# Patient Record
Sex: MALE | Race: WHITE | NOT HISPANIC OR LATINO | Employment: OTHER | URBAN - METROPOLITAN AREA
[De-identification: names, ages, dates, MRNs, and addresses within clinical notes are randomized per-mention and may not be internally consistent; named-entity substitution may affect disease eponyms.]

---

## 2017-02-21 ENCOUNTER — RESOLUTE PROFESSIONAL BILLING HOSPITAL PROF FEE (OUTPATIENT)
Dept: HOSPITALIST | Facility: MEDICAL CENTER | Age: 37
End: 2017-02-21
Payer: OTHER MISCELLANEOUS

## 2017-02-21 ENCOUNTER — HOSPITAL ENCOUNTER (INPATIENT)
Facility: MEDICAL CENTER | Age: 37
LOS: 5 days | DRG: 520 | End: 2017-02-26
Attending: EMERGENCY MEDICINE | Admitting: HOSPITALIST
Payer: OTHER MISCELLANEOUS

## 2017-02-21 ENCOUNTER — APPOINTMENT (OUTPATIENT)
Dept: RADIOLOGY | Facility: MEDICAL CENTER | Age: 37
DRG: 520 | End: 2017-02-21
Attending: EMERGENCY MEDICINE
Payer: OTHER MISCELLANEOUS

## 2017-02-21 DIAGNOSIS — M51.36 L4-L5 DISC BULGE: ICD-10-CM

## 2017-02-21 DIAGNOSIS — M54.16 LUMBAR RADICULOPATHY, ACUTE: ICD-10-CM

## 2017-02-21 DIAGNOSIS — M54.16 LUMBAR NERVE ROOT COMPRESSION: ICD-10-CM

## 2017-02-21 DIAGNOSIS — M54.16 LEFT LUMBAR RADICULOPATHY: ICD-10-CM

## 2017-02-21 PROBLEM — M54.59 INTRACTABLE LOW BACK PAIN: Status: ACTIVE | Noted: 2017-02-21

## 2017-02-21 LAB
ALBUMIN SERPL BCP-MCNC: 3.6 G/DL (ref 3.2–4.9)
ALBUMIN/GLOB SERPL: 1.3 G/DL
ALP SERPL-CCNC: 53 U/L (ref 30–99)
ALT SERPL-CCNC: 11 U/L (ref 2–50)
ANION GAP SERPL CALC-SCNC: 7 MMOL/L (ref 0–11.9)
APPEARANCE UR: CLEAR
AST SERPL-CCNC: 14 U/L (ref 12–45)
BASOPHILS # BLD AUTO: 0.7 % (ref 0–1.8)
BASOPHILS # BLD: 0.06 K/UL (ref 0–0.12)
BILIRUB SERPL-MCNC: 0.4 MG/DL (ref 0.1–1.5)
BILIRUB UR QL STRIP.AUTO: NEGATIVE
BUN SERPL-MCNC: 17 MG/DL (ref 8–22)
CALCIUM SERPL-MCNC: 8.8 MG/DL (ref 8.5–10.5)
CHLORIDE SERPL-SCNC: 108 MMOL/L (ref 96–112)
CO2 SERPL-SCNC: 22 MMOL/L (ref 20–33)
COLOR UR: YELLOW
CREAT SERPL-MCNC: 0.87 MG/DL (ref 0.5–1.4)
CULTURE IF INDICATED INDCX: NO UA CULTURE
EOSINOPHIL # BLD AUTO: 0.27 K/UL (ref 0–0.51)
EOSINOPHIL NFR BLD: 3.3 % (ref 0–6.9)
ERYTHROCYTE [DISTWIDTH] IN BLOOD BY AUTOMATED COUNT: 38.2 FL (ref 35.9–50)
GFR SERPL CREATININE-BSD FRML MDRD: >60 ML/MIN/1.73 M 2
GLOBULIN SER CALC-MCNC: 2.7 G/DL (ref 1.9–3.5)
GLUCOSE SERPL-MCNC: 95 MG/DL (ref 65–99)
GLUCOSE UR STRIP.AUTO-MCNC: NEGATIVE MG/DL
HCT VFR BLD AUTO: 46.2 % (ref 42–52)
HGB BLD-MCNC: 16.1 G/DL (ref 14–18)
IMM GRANULOCYTES # BLD AUTO: 0.04 K/UL (ref 0–0.11)
IMM GRANULOCYTES NFR BLD AUTO: 0.5 % (ref 0–0.9)
KETONES UR STRIP.AUTO-MCNC: NEGATIVE MG/DL
LEUKOCYTE ESTERASE UR QL STRIP.AUTO: NEGATIVE
LYMPHOCYTES # BLD AUTO: 3.1 K/UL (ref 1–4.8)
LYMPHOCYTES NFR BLD: 38.2 % (ref 22–41)
MCH RBC QN AUTO: 28.4 PG (ref 27–33)
MCHC RBC AUTO-ENTMCNC: 34.8 G/DL (ref 33.7–35.3)
MCV RBC AUTO: 81.5 FL (ref 81.4–97.8)
MICRO URNS: NORMAL
MONOCYTES # BLD AUTO: 0.5 K/UL (ref 0–0.85)
MONOCYTES NFR BLD AUTO: 6.2 % (ref 0–13.4)
NEUTROPHILS # BLD AUTO: 4.15 K/UL (ref 1.82–7.42)
NEUTROPHILS NFR BLD: 51.1 % (ref 44–72)
NITRITE UR QL STRIP.AUTO: NEGATIVE
NRBC # BLD AUTO: 0 K/UL
NRBC BLD AUTO-RTO: 0 /100 WBC
PH UR STRIP.AUTO: 6 [PH]
PLATELET # BLD AUTO: 324 K/UL (ref 164–446)
PMV BLD AUTO: 9 FL (ref 9–12.9)
POTASSIUM SERPL-SCNC: 4.1 MMOL/L (ref 3.6–5.5)
PROT SERPL-MCNC: 6.3 G/DL (ref 6–8.2)
PROT UR QL STRIP: NEGATIVE MG/DL
RBC # BLD AUTO: 5.67 M/UL (ref 4.7–6.1)
RBC UR QL AUTO: NEGATIVE
SODIUM SERPL-SCNC: 137 MMOL/L (ref 135–145)
SP GR UR STRIP.AUTO: 1.01
WBC # BLD AUTO: 8.1 K/UL (ref 4.8–10.8)

## 2017-02-21 PROCEDURE — 80053 COMPREHEN METABOLIC PANEL: CPT

## 2017-02-21 PROCEDURE — 700111 HCHG RX REV CODE 636 W/ 250 OVERRIDE (IP): Performed by: EMERGENCY MEDICINE

## 2017-02-21 PROCEDURE — 99285 EMERGENCY DEPT VISIT HI MDM: CPT

## 2017-02-21 PROCEDURE — 700102 HCHG RX REV CODE 250 W/ 637 OVERRIDE(OP): Performed by: HOSPITALIST

## 2017-02-21 PROCEDURE — 96375 TX/PRO/DX INJ NEW DRUG ADDON: CPT

## 2017-02-21 PROCEDURE — A9270 NON-COVERED ITEM OR SERVICE: HCPCS | Performed by: INTERNAL MEDICINE

## 2017-02-21 PROCEDURE — 72158 MRI LUMBAR SPINE W/O & W/DYE: CPT

## 2017-02-21 PROCEDURE — 700102 HCHG RX REV CODE 250 W/ 637 OVERRIDE(OP): Performed by: INTERNAL MEDICINE

## 2017-02-21 PROCEDURE — 99223 1ST HOSP IP/OBS HIGH 75: CPT | Performed by: HOSPITALIST

## 2017-02-21 PROCEDURE — 700111 HCHG RX REV CODE 636 W/ 250 OVERRIDE (IP): Performed by: HOSPITALIST

## 2017-02-21 PROCEDURE — 700117 HCHG RX CONTRAST REV CODE 255: Performed by: EMERGENCY MEDICINE

## 2017-02-21 PROCEDURE — 81003 URINALYSIS AUTO W/O SCOPE: CPT

## 2017-02-21 PROCEDURE — 770006 HCHG ROOM/CARE - MED/SURG/GYN SEMI*

## 2017-02-21 PROCEDURE — 96374 THER/PROPH/DIAG INJ IV PUSH: CPT

## 2017-02-21 PROCEDURE — A9270 NON-COVERED ITEM OR SERVICE: HCPCS | Performed by: HOSPITALIST

## 2017-02-21 PROCEDURE — A9579 GAD-BASE MR CONTRAST NOS,1ML: HCPCS | Performed by: EMERGENCY MEDICINE

## 2017-02-21 PROCEDURE — 85025 COMPLETE CBC W/AUTO DIFF WBC: CPT

## 2017-02-21 PROCEDURE — 96376 TX/PRO/DX INJ SAME DRUG ADON: CPT

## 2017-02-21 RX ORDER — ENEMA 19; 7 G/133ML; G/133ML
1 ENEMA RECTAL
Status: DISCONTINUED | OUTPATIENT
Start: 2017-02-21 | End: 2017-02-26 | Stop reason: HOSPADM

## 2017-02-21 RX ORDER — OXYCODONE HYDROCHLORIDE 10 MG/1
10 TABLET ORAL
Status: DISCONTINUED | OUTPATIENT
Start: 2017-02-21 | End: 2017-02-26 | Stop reason: HOSPADM

## 2017-02-21 RX ORDER — M-VIT,TX,IRON,MINS/CALC/FOLIC 27MG-0.4MG
1 TABLET ORAL DAILY
COMMUNITY
End: 2017-04-07

## 2017-02-21 RX ORDER — MORPHINE SULFATE 4 MG/ML
4 INJECTION, SOLUTION INTRAMUSCULAR; INTRAVENOUS
Status: DISCONTINUED | OUTPATIENT
Start: 2017-02-21 | End: 2017-02-22

## 2017-02-21 RX ORDER — OMEPRAZOLE 20 MG/1
20 CAPSULE, DELAYED RELEASE ORAL DAILY
Status: DISCONTINUED | OUTPATIENT
Start: 2017-02-22 | End: 2017-02-26 | Stop reason: HOSPADM

## 2017-02-21 RX ORDER — CHLORAL HYDRATE 500 MG
1000 CAPSULE ORAL DAILY
COMMUNITY
End: 2017-04-07

## 2017-02-21 RX ORDER — PROMETHAZINE HYDROCHLORIDE 25 MG/1
12.5-25 TABLET ORAL EVERY 4 HOURS PRN
Status: DISCONTINUED | OUTPATIENT
Start: 2017-02-21 | End: 2017-02-26 | Stop reason: HOSPADM

## 2017-02-21 RX ORDER — DIAZEPAM 5 MG/ML
5 INJECTION, SOLUTION INTRAMUSCULAR; INTRAVENOUS ONCE
Status: COMPLETED | OUTPATIENT
Start: 2017-02-21 | End: 2017-02-21

## 2017-02-21 RX ORDER — DEXAMETHASONE SODIUM PHOSPHATE 4 MG/ML
4 INJECTION, SOLUTION INTRA-ARTICULAR; INTRALESIONAL; INTRAMUSCULAR; INTRAVENOUS; SOFT TISSUE EVERY 6 HOURS
Status: DISCONTINUED | OUTPATIENT
Start: 2017-02-22 | End: 2017-02-26 | Stop reason: HOSPADM

## 2017-02-21 RX ORDER — GABAPENTIN 300 MG/1
300 CAPSULE ORAL 3 TIMES DAILY
COMMUNITY
End: 2017-04-07

## 2017-02-21 RX ORDER — ONDANSETRON 2 MG/ML
4 INJECTION INTRAMUSCULAR; INTRAVENOUS EVERY 4 HOURS PRN
Status: DISCONTINUED | OUTPATIENT
Start: 2017-02-21 | End: 2017-02-26

## 2017-02-21 RX ORDER — LACTULOSE 20 G/30ML
30 SOLUTION ORAL
Status: DISCONTINUED | OUTPATIENT
Start: 2017-02-21 | End: 2017-02-26 | Stop reason: HOSPADM

## 2017-02-21 RX ORDER — AMOXICILLIN 250 MG
1 CAPSULE ORAL
Status: DISCONTINUED | OUTPATIENT
Start: 2017-02-21 | End: 2017-02-26

## 2017-02-21 RX ORDER — PROMETHAZINE HYDROCHLORIDE 12.5 MG/1
12.5-25 SUPPOSITORY RECTAL EVERY 4 HOURS PRN
Status: DISCONTINUED | OUTPATIENT
Start: 2017-02-21 | End: 2017-02-26 | Stop reason: HOSPADM

## 2017-02-21 RX ORDER — DOCUSATE SODIUM 100 MG/1
100 CAPSULE, LIQUID FILLED ORAL 2 TIMES DAILY
Status: DISCONTINUED | OUTPATIENT
Start: 2017-02-21 | End: 2017-02-26 | Stop reason: HOSPADM

## 2017-02-21 RX ORDER — ACETAMINOPHEN 325 MG/1
650 TABLET ORAL EVERY 6 HOURS PRN
Status: DISCONTINUED | OUTPATIENT
Start: 2017-02-21 | End: 2017-02-26 | Stop reason: HOSPADM

## 2017-02-21 RX ORDER — AMOXICILLIN 250 MG
1 CAPSULE ORAL NIGHTLY
Status: DISCONTINUED | OUTPATIENT
Start: 2017-02-21 | End: 2017-02-26 | Stop reason: HOSPADM

## 2017-02-21 RX ORDER — ONDANSETRON 2 MG/ML
4 INJECTION INTRAMUSCULAR; INTRAVENOUS ONCE
Status: COMPLETED | OUTPATIENT
Start: 2017-02-21 | End: 2017-02-21

## 2017-02-21 RX ORDER — OXYCODONE HYDROCHLORIDE 5 MG/1
5 TABLET ORAL
Status: DISCONTINUED | OUTPATIENT
Start: 2017-02-21 | End: 2017-02-26 | Stop reason: HOSPADM

## 2017-02-21 RX ORDER — ALBUTEROL SULFATE 90 UG/1
2 AEROSOL, METERED RESPIRATORY (INHALATION) EVERY 6 HOURS PRN
COMMUNITY

## 2017-02-21 RX ORDER — IBUPROFEN 600 MG/1
600 TABLET ORAL EVERY 6 HOURS PRN
Status: DISCONTINUED | OUTPATIENT
Start: 2017-02-21 | End: 2017-02-22

## 2017-02-21 RX ORDER — ALBUTEROL SULFATE 90 UG/1
2 AEROSOL, METERED RESPIRATORY (INHALATION) EVERY 6 HOURS PRN
Status: DISCONTINUED | OUTPATIENT
Start: 2017-02-21 | End: 2017-02-26 | Stop reason: HOSPADM

## 2017-02-21 RX ORDER — CHOLECALCIFEROL (VITAMIN D3) 125 MCG
1000 CAPSULE ORAL DAILY
Status: DISCONTINUED | OUTPATIENT
Start: 2017-02-22 | End: 2017-02-26 | Stop reason: HOSPADM

## 2017-02-21 RX ORDER — ONDANSETRON 4 MG/1
4 TABLET, ORALLY DISINTEGRATING ORAL EVERY 4 HOURS PRN
Status: DISCONTINUED | OUTPATIENT
Start: 2017-02-21 | End: 2017-02-26 | Stop reason: HOSPADM

## 2017-02-21 RX ORDER — BISACODYL 10 MG
10 SUPPOSITORY, RECTAL RECTAL
Status: DISCONTINUED | OUTPATIENT
Start: 2017-02-21 | End: 2017-02-26 | Stop reason: HOSPADM

## 2017-02-21 RX ORDER — DEXAMETHASONE SODIUM PHOSPHATE 4 MG/ML
4 INJECTION, SOLUTION INTRA-ARTICULAR; INTRALESIONAL; INTRAMUSCULAR; INTRAVENOUS; SOFT TISSUE EVERY 6 HOURS
Status: DISCONTINUED | OUTPATIENT
Start: 2017-02-21 | End: 2017-02-21

## 2017-02-21 RX ORDER — ALUMINA, MAGNESIA, AND SIMETHICONE 2400; 2400; 240 MG/30ML; MG/30ML; MG/30ML
10 SUSPENSION ORAL 4 TIMES DAILY PRN
Status: DISCONTINUED | OUTPATIENT
Start: 2017-02-21 | End: 2017-02-26 | Stop reason: HOSPADM

## 2017-02-21 RX ORDER — IBUPROFEN 200 MG
600-800 TABLET ORAL EVERY 6 HOURS
Status: ON HOLD | COMMUNITY
End: 2017-02-25

## 2017-02-21 RX ADMIN — HYDROMORPHONE HYDROCHLORIDE 1 MG: 1 INJECTION, SOLUTION INTRAMUSCULAR; INTRAVENOUS; SUBCUTANEOUS at 13:47

## 2017-02-21 RX ADMIN — HYDROMORPHONE HYDROCHLORIDE 1 MG: 1 INJECTION, SOLUTION INTRAMUSCULAR; INTRAVENOUS; SUBCUTANEOUS at 15:15

## 2017-02-21 RX ADMIN — MORPHINE SULFATE 4 MG: 4 INJECTION INTRAVENOUS at 21:11

## 2017-02-21 RX ADMIN — HYDROMORPHONE HYDROCHLORIDE 1 MG: 1 INJECTION, SOLUTION INTRAMUSCULAR; INTRAVENOUS; SUBCUTANEOUS at 17:52

## 2017-02-21 RX ADMIN — DEXAMETHASONE SODIUM PHOSPHATE 4 MG: 4 INJECTION, SOLUTION INTRAMUSCULAR; INTRAVENOUS at 23:54

## 2017-02-21 RX ADMIN — DIAZEPAM 5 MG: 5 INJECTION, SOLUTION INTRAMUSCULAR; INTRAVENOUS at 13:46

## 2017-02-21 RX ADMIN — OXYCODONE HYDROCHLORIDE 10 MG: 10 TABLET ORAL at 23:54

## 2017-02-21 RX ADMIN — GADODIAMIDE 20 ML: 287 INJECTION INTRAVENOUS at 15:13

## 2017-02-21 RX ADMIN — ALUMINUM HYDROXIDE, MAGNESIUM HYDROXIDE,SIMETHICONE 10 ML: 400; 400; 40 LIQUID ORAL at 23:55

## 2017-02-21 RX ADMIN — ONDANSETRON 4 MG: 2 INJECTION, SOLUTION INTRAMUSCULAR; INTRAVENOUS at 13:47

## 2017-02-21 RX ADMIN — DEXAMETHASONE SODIUM PHOSPHATE 4 MG: 4 INJECTION, SOLUTION INTRAMUSCULAR; INTRAVENOUS at 16:43

## 2017-02-21 ASSESSMENT — PAIN SCALES - GENERAL
PAINLEVEL_OUTOF10: 6
PAINLEVEL_OUTOF10: 8
PAINLEVEL_OUTOF10: 10

## 2017-02-21 ASSESSMENT — LIFESTYLE VARIABLES
EVER_SMOKED: YES
ALCOHOL_USE: NO

## 2017-02-21 NOTE — LETTER
[facility], EMERGENCY DEPT   [address], [place] [address]  Phone: Dept: [phone] - Fax:        [facility] Progress Report and Disability Certification  Date of Service: 2/21/2017   No Show:  No  Date / Time of Next Visit:     Claim Information   Patient Name: Rodriguez Kramer  Claim Number:     Employer:    Date of Injury: 12/3/2012     Insurer / TPA: [employer] Workers Comp ID / SSN: [id number]    Occupation:  Diagnosis: There were no encounter diagnoses.    Medical Information   Related to Industrial Injury?   ***   Subjective Complaints:      Objective Findings:     Pre-Existing Condition(s):     Assessment:        Status:    Permanent Disability:     Plan:      Diagnostics:      Comments:       Disability Information   Status:      From:     Through:   Restrictions are:     Physical Restrictions   Sitting:    Standing:    Stooping:    Bending:      Squatting:    Walking:    Climbing:    Pushing:      Pulling:    Other:    Reaching Above Shoulder (L):   Reaching Above Shoulder (R):       Reaching Below Shoulder (L):    Reaching Below Shoulder (R):      Not to exceed Weight Limits   Carrying(hrs):   Weight Limit(lb):   Lifting(hrs):   Weight  Limit(lb):     Comments:      Repetitive Actions   Hands: i.e. Fine Manipulations from Grasping:     Feet: i.e. Operating Foot Controls:     Driving / Operate Machinery:     Physician Name: Elen Abdi Physician Signature:   e-Signature:  , Medical Director   Clinic Name / Location: [facility], EMERGENCY DEPT  [address] [place] [id number] [phone]     Clinic Phone Number: Dept: [phone]   Appointment Time:  Visit Start Time:    Check-In Time:  1:18 PM Visit Discharge Time:    Original-Treating Physician or Chiropractor    Page 2-Insurer/TPA    Page 3-Employer    Page 4-Employee

## 2017-02-21 NOTE — IP AVS SNAPSHOT
PlanetTran Access Code: [email or web address]  Expires: 3/28/2017 12:10 PM    Your email address is not on file at [employer].  Email Addresses are required for you to sign up for PlanetTran, please contact [phone] to verify your personal information and to provide your email address prior to attempting to register for PlanetTran.    Rodriguez Kramer  [address] [place] [address]    PlanetTran  A secure, online tool to manage your health information     [employer]’s PlanetTran® is a secure, online tool that connects you to your personalized health information from the privacy of your home -- day or night - making it very easy for you to manage your healthcare. Once the activation process is completed, you can even access your medical information using the PlanetTran alysia, which is available for free in the Apple Alysia store or Google Play store.     To learn more about PlanetTran, visit [email or web address]"t    There are two levels of access available (as shown below):   My Chart Features  [facility] Primary Care Doctor [facility]  Specialists [facility]  Urgent  Care Non-[facility] Primary Care Doctor   Email your healthcare team securely and privately 24/7 X X X    Manage appointments: schedule your next appointment; view details of past/upcoming appointments X      Request prescription refills. X      View recent personal medical records, including lab and immunizations X X X X   View health record, including health history, allergies, medications X X X X   Read reports about your outpatient visits, procedures, consult and ER notes X X X X   See your discharge summary, which is a recap of your hospital and/or ER visit that includes your diagnosis, lab results, and care plan X X  X     How to register for "[employer]"t:  Once your e-mail address has been verified, follow the following steps to sign up for "[employer]"t.     1. Go to  [email or web address]  2. Click on the Sign Up Now box, which takes you to the New Member Sign Up page. You will need  to provide the following information:  a. Enter your Infotop Access Code exactly as it appears at the top of this page. (You will not need to use this code after you’ve completed the sign-up process. If you do not sign up before the expiration date, you must request a new code.)   b. Enter your date of birth.   c. Enter your home email address.   d. Click Submit, and follow the next screen’s instructions.  3. Create a Infotop ID. This will be your Infotop login ID and cannot be changed, so think of one that is secure and easy to remember.  4. Create a Infotop password. You can change your password at any time.  5. Enter your Password Reset Question and Answer. This can be used at a later time if you forget your password.   6. Enter your e-mail address. This allows you to receive e-mail notifications when new information is available in Infotop.  7. Click Sign Up. You can now view your health information.    For assistance activating your Infotop account, call [phone]

## 2017-02-21 NOTE — ED PROVIDER NOTES
"ED Provider Note    Scribed for Elen Abdi M.D. by Ricardo Yeung. 2/21/2017  1:27 PM    Primary care provider: Selvin Marks M.D.  Means of arrival: EMS  History obtained from: patient  History limited by: none    CHIEF COMPLAINT  Chief Complaint   Patient presents with   • Low Back Pain     chronic back pain, flaring last few days, severe pain at this time.  Non-ambulatory       HPI  Rodriguez Kramer is a 36 y.o. male with chronic back pain who presents to the Emergency Department with worsening left sided low back pain starting 3 days ago. He states that he has been walking more the past few days, which exacerbated his chonric condition. Patient states that he coughed today, causing sudden and severe pain in his back. Patient went to lay down in bed, but was unable to get up. He reports being unable to ambulate secondary to his pain. Patient describes his back pain as burning, that radiates down his left leg \"like a laser\". He states that he has associated left foot pain with numbness. Patient has a history of back fusion of the L3-L4 and L4-L5 secondary to a work injury. Dr. Lindsey and Dr. Lambert are his consulting physicians. He takes gabapentin for his back pain. Patient reports a history of marijuana use, cigarette use, and asthma. Patient denies loss of bladder control, alcohol use, fever, congestion.     REVIEW OF SYSTEMS  HEENT:  No ear pain, congestion, or sore throat   EYES: no discharge, redness, or vision changes  CARDIAC: no chest pain, no palpitations    PULMONARY: Positive cough, no dyspnea or congestion   GI: no vomiting, diarrhea, or abdominal pain   : no loss of bladder control, dysuria or hematuria   Neuro: no weakness, aphasia, or headache  Musculoskeletal: Positive back pain,left foot pain,left foot numbness no swelling, deformity, or joint swelling  Endocrine: no fevers, sweating, or weight loss   SKIN: no rash, erythema, or contusions     See history of present illness.       PAST " "MEDICAL HISTORY   has a past medical history of ASTHMA and Bulging discs (12/2012).    SURGICAL HISTORY   has past surgical history that includes lumbar laminectomy diskectomy (11/25/08); other neurological surg; lumbar laminectomy diskectomy (8/3/2014); and lumbar fusion posterior (6/15/2015).    SOCIAL HISTORY  Social History   Substance Use Topics   • Smoking status: Current Every Day Smoker -- 0.20 packs/day     Types: Cigarettes   • Smokeless tobacco: No   • Alcohol Use: No      History   Drug Use No       FAMILY HISTORY  None noted    CURRENT MEDICATIONS    Current facility-administered medications:   •  [START ON 2/22/2017] dexamethasone (DECADRON) injection 4 mg, 4 mg, Intravenous, Q6HRS, Nain Painter M.D.    Current outpatient prescriptions:   •  gabapentin (NEURONTIN) 300 MG Cap, Take 300 mg by mouth 3 times a day., Disp: , Rfl:   •  therapeutic multivitamin-minerals (THERAGRAN-M) Tab, Take 1 Tab by mouth every day., Disp: , Rfl:   •  albuterol 108 (90 BASE) MCG/ACT Aero Soln inhalation aerosol, Inhale 2 Puffs by mouth every 6 hours as needed for Shortness of Breath., Disp: , Rfl:   •  Omega-3 Fatty Acids (FISH OIL) 1000 MG Cap capsule, Take 1,000 mg by mouth every day., Disp: , Rfl:   •  Cyanocobalamin (B-12 PO), Take 1 Tab by mouth every day., Disp: , Rfl:   •  Cholecalciferol (VITAMIN D PO), Take 1 Cap by mouth every day., Disp: , Rfl:   •  ibuprofen (MOTRIN) 200 MG Tab, Take 600-800 mg by mouth every 6 hours. Indications: Mild to Moderate Pain, Disp: , Rfl:     ALLERGIES  Allergies   Allergen Reactions   • Nkda [No Known Drug Allergy]        PHYSICAL EXAM  VITAL SIGNS: /88 mmHg  Pulse 101  Temp(Src) 37.2 °C (98.9 °F)  Resp 18  Ht 1.829 m (6' 0.01\")  Wt 99.791 kg (220 lb)  BMI 29.83 kg/m2    Constitutional: Well developed, Well nourished, Significant distress secondary to pain, Non-toxic appearance. Tearful and uncomfortable.   HEENT: Normocephalic, Atraumatic,  external ears normal, " pharynx pink,  Mucous  Membranes moist, No rhinorrhea or mucosal edema  Eyes: PERRL, EOMI, Conjunctiva normal, No discharge.   Neck: Normal range of motion, No tenderness, Supple, No stridor.   Lymphatic: No lymphadenopathy    Cardiovascular: Regular Rate and Rhythm, No murmurs,  rubs, or gallops.   Thorax & Lungs: Lungs clear to auscultation bilaterally, No respiratory distress, No wheezes, rhales or rhonchi, No chest wall tenderness.   Abdomen: Bowel sounds normal, Soft, non tender, non distended,  No pulsatile masses., no rebound guarding or peritoneal signs.   Skin: Warm, Dry, No erythema, No rash,   Extremities: Equal, intact distal pulses, No cyanosis, No tenderness.   Musculoskeletal: Good range of motion in all major joints. No major deformities noted. Paraspinus lumbar muscle tenderness with muscle spasms. Difficult finding a comfortable position  Neurologic: Alert & awake, no focal deficits. Left lateral foot numbness.   Psychiatric: Affect normal    DIAGNOSTIC STUDIES / PROCEDURES    LABS  Results for orders placed or performed during the hospital encounter of 02/21/17   CBC WITH DIFFERENTIAL   Result Value Ref Range    WBC 8.1 4.8 - 10.8 K/uL    RBC 5.67 4.70 - 6.10 M/uL    Hemoglobin 16.1 14.0 - 18.0 g/dL    Hematocrit 46.2 42.0 - 52.0 %    MCV 81.5 81.4 - 97.8 fL    MCH 28.4 27.0 - 33.0 pg    MCHC 34.8 33.7 - 35.3 g/dL    RDW 38.2 35.9 - 50.0 fL    Platelet Count 324 164 - 446 K/uL    MPV 9.0 9.0 - 12.9 fL    Neutrophils-Polys 51.10 44.00 - 72.00 %    Lymphocytes 38.20 22.00 - 41.00 %    Monocytes 6.20 0.00 - 13.40 %    Eosinophils 3.30 0.00 - 6.90 %    Basophils 0.70 0.00 - 1.80 %    Immature Granulocytes 0.50 0.00 - 0.90 %    Nucleated RBC 0.00 /100 WBC    Neutrophils (Absolute) 4.15 1.82 - 7.42 K/uL    Lymphs (Absolute) 3.10 1.00 - 4.80 K/uL    Monos (Absolute) 0.50 0.00 - 0.85 K/uL    Eos (Absolute) 0.27 0.00 - 0.51 K/uL    Baso (Absolute) 0.06 0.00 - 0.12 K/uL    Immature Granulocytes (abs) 0.04  0.00 - 0.11 K/uL    NRBC (Absolute) 0.00 K/uL   CMP   Result Value Ref Range    Sodium 137 135 - 145 mmol/L    Potassium 4.1 3.6 - 5.5 mmol/L    Chloride 108 96 - 112 mmol/L    Co2 22 20 - 33 mmol/L    Anion Gap 7.0 0.0 - 11.9    Glucose 95 65 - 99 mg/dL    Bun 17 8 - 22 mg/dL    Creatinine 0.87 0.50 - 1.40 mg/dL    Calcium 8.8 8.5 - 10.5 mg/dL    AST(SGOT) 14 12 - 45 U/L    ALT(SGPT) 11 2 - 50 U/L    Alkaline Phosphatase 53 30 - 99 U/L    Total Bilirubin 0.4 0.1 - 1.5 mg/dL    Albumin 3.6 3.2 - 4.9 g/dL    Total Protein 6.3 6.0 - 8.2 g/dL    Globulin 2.7 1.9 - 3.5 g/dL    A-G Ratio 1.3 g/dL   URINALYSIS,CULTURE IF INDICATED   Result Value Ref Range    Color Yellow     Character Clear     Specific Gravity 1.015 <1.035    Ph 6.0 5.0-8.0    Glucose Negative Negative mg/dL    Ketones Negative Negative mg/dL    Protein Negative Negative mg/dL    Bilirubin Negative Negative    Nitrite Negative Negative    Leukocyte Esterase Negative Negative    Occult Blood Negative Negative    Micro Urine Req see below     Culture Indicated No UA Culture   ESTIMATED GFR   Result Value Ref Range    GFR If African American >60 >60 mL/min/1.73 m 2    GFR If Non African American >60 >60 mL/min/1.73 m 2   All labs reviewed by me.    RADIOLOGY  MR-LUMBAR SPINE-WITH & W/O   Final Result      1.  Transitional lumbosacral junction. Partial sacralization of L5. Developmentally diminutive L5-S1 disc space.   2.  L3-4 postoperative lumbar laminectomy. Interval posterior fusion with transpedicular screw fixation at L3-L4.   3.  L3-4 central and left paramedian disc-osteophyte complex, primarily spur. Moderate left lateral recess stenosis. Aberrant soft tissue the left lateral recess which may represent epidural scarring perhaps with some underlying hypoenhancing disc    material. Borderline left inferior foraminal stenosis.   4.  L4-5 small central and right paramedian disc protrusion. Moderate sized left-sided caudad disc extrusion or free  fragment. New finding since the previous exam. Compromise of the left L5 nerve root and also impingement on the thecal sac to the    emerging left S1 nerve root. Persisting hypoenhancing aberrant soft tissue at the right lateral recess which was seen on the previous exam as a moderate-sized right-sided disc extrusion or free fragment.   5.  L5-S1 no significant disc bulge or protrusion. No central or foraminal stenosis at the L5-S1 disc space level.      The radiologist's interpretation of all radiological studies have been reviewed by me.    COURSE & MEDICAL DECISION MAKING  Nursing notes, VS, PMSFHx reviewed in chart.     1:27 PM - Patient seen and examined at bedside. Patient will be treated with Dilaudid 1 mg, Zofran 4 mg, Valium 5 mg. Ordered MR lumbar spine, Estimated GFR, Urinalysis, CBC with differential, CMP to evaluate his symptoms. Differential diagnoses include but not limited to: exacerbation of chronic condition, bulging disc, lumbar radiculopathy    2:48 PM - Nurse informs me that the patient is complaining of increased pain. I will order an additional 1 mg Dilaudid.     4:32 PM - I discussed the patient's case and the above findings with Dr. Calderon (neurosurgeon) who recommends that the patient be admitted. He requests that decadron be administered. I agree and will paged hospitalist. Patient will be treated with 4 mg Decadron.     4:36 PM - Paged hospitalist.    4:39 PM - informed patient of treatment plan. Patient will be admitted and provided with steroid injections every 6 hours and pain medication. Patient and girlfriend asking questions about the process. Informed them that Dr. Lindsey will follow up with the patient. Patient agrees to admission and is in good spirits.     4:48 PM - I discussed the patient's case and the above findings with Dr. Joseph (hospitalist) who agrees to admit the patient. Dr. Lindsey to consult.    DISPOSITION:  Patient will be admitted to hospitalist in [facility]  condition.      FINAL IMPRESSION  1. Lumbar radiculopathy, acute    2. L4-L5 disc bulge          IRicardo (Scribe), am scribing for, and in the presence of, Elen Abdi M.D..    Electronically signed by: Ricardo Yeung (Scribe), 2/21/2017    IElen M.D. personally performed the services described in this documentation, as scribed by Ricardo Yeung in my presence, and it is both accurate and complete.    The note accurately reflects work and decisions made by me.  Elen Abdi  2/21/2017  7:18 PM

## 2017-02-21 NOTE — IP AVS SNAPSHOT
" Home Care Instructions                                                                                                                  Name:Rodriguez Kramer  Medical Record Number:[id number]  CSN: [id number]    YOB: 1980   Age: 36 y.o.  Sex: male  HT:1.829 m (6' 0.01\") WT: 45.677 kg (100 lb 11.2 oz)          Admit Date: 2/21/2017     Discharge Date:   Today's Date: 2/26/2017  Attending Doctor:  uLke Whittaker M.D.                  Allergies:  Nkda            Discharge Instructions       Discharge Instructions    Discharged to home by car with relative. Discharged via wheelchair, hospital escort: Yes.  Special equipment needed: Cane    Be sure to schedule a follow-up appointment with your primary care doctor or any specialists as instructed.     Discharge Plan:   Diet Plan: Discussed  Activity Level: Discussed  Smoking Cessation Offered: Patient Refused  Confirmed Follow up Appointment: Appointment Scheduled  Confirmed Symptoms Management: Discussed  Medication Reconciliation Updated: Yes  Influenza Vaccine Indication: Indicated: 9 to 64 years of age    I understand that a diet low in cholesterol, fat, and sodium is recommended for good health. Unless I have been given specific instructions below for another diet, I accept this instruction as my diet prescription.   Other diet: As tolerated. A diet low in sodium and saturated fats is best.    Special Instructions: None    · Is patient discharged on Warfarin / Coumadin?   No     · Is patient Post Blood Transfusion?  No    Depression / Suicide Risk    As you are discharged from this [facility] facility, it is important to learn how to keep safe from harming yourself.    Recognize the warning signs:  · Abrupt changes in personality, positive or negative- including increase in energy   · Giving away possessions  · Change in eating patterns- significant weight changes-  positive or negative  · Change in sleeping patterns- unable to sleep or sleeping all " the time   · Unwillingness or inability to communicate  · Depression  · Unusual sadness, discouragement and loneliness  · Talk of wanting to die  · Neglect of personal appearance   · Rebelliousness- reckless behavior  · Withdrawal from people/activities they love  · Confusion- inability to concentrate     If you or a loved one observes any of these behaviors or has concerns about self-harm, here's what you can do:  · Talk about it- your feelings and reasons for harming yourself  · Remove any means that you might use to hurt yourself (examples: pills, rope, extension cords, firearm)  · Get professional help from the community (Mental Health, Substance Abuse, psychological counseling)  · Do not be alone:Call your Safe Contact- someone whom you trust who will be there for you.  · Call your local CRISIS HOTLINE 212-6449 or 783-077-2374  · Call your local Children's Mobile Crisis Response Team [place] (339) 210-5284 or www.Oncodesign  · Call the toll free National Suicide Prevention Hotlines   · National Suicide Prevention Lifeline 337-960-DIUY (6388)  · [employer] Hope Line Network 800-SUICIDE (110-9144)        Follow-up Information     1. Follow up with Christopher Calderon M.D.. Schedule an appointment as soon as possible for a visit in 2 weeks.    Specialty:  Neurosurgery    Contact information    [address]  [address]  [place] [id number] [phone]          2. Follow up with TERRIE Bush In 2 weeks.    Specialty:  Family Medicine    Contact information    [address]   [address]  [place] [address]  [phone]           Discharge Medication Instructions:    Below are the medications your physician expects you to take upon discharge:    Review all your home medications and newly ordered medications with your doctor and/or pharmacist. Follow medication instructions as directed by your doctor and/or pharmacist.    Please keep your medication list with you and share with your physician.               Medication  List      START taking these medications        Instructions     MG Caps   Last time this was given:  100 mg on 2/26/2017  8:41 AM    Take 100 mg by mouth 2 Times a Day. While taking pain medications   Dose:  100 mg       oxycodone immediate release 10 MG immediate release tablet   Last time this was given:  10 mg on 2/26/2017  5:22 AM   Commonly known as:  ROXICODONE    Take 1 Tab by mouth every four hours as needed for Severe Pain.   Dose:  10 mg       tizanidine 2 MG tablet   Commonly known as:  ZANAFLEX    Take 1 Tab by mouth 3 times a day as needed.   Dose:  2 mg         CONTINUE taking these medications        Instructions    albuterol 108 (90 BASE) MCG/ACT Aers inhalation aerosol    Inhale 2 Puffs by mouth every 6 hours as needed for Shortness of Breath.   Dose:  2 Puff       B-12 PO   Last time this was given:  1,000 mcg on 2/26/2017  8:41 AM    Take 1 Tab by mouth every day.   Dose:  1 Tab       fish oil 1000 MG Caps capsule   Last time this was given:  1,000 mg on 2/26/2017  8:41 AM    Take 1,000 mg by mouth every day.   Dose:  1000 mg       gabapentin 300 MG Caps   Commonly known as:  NEURONTIN    Take 300 mg by mouth 3 times a day.   Dose:  300 mg       therapeutic multivitamin-minerals Tabs   Last time this was given:  1 Tab on 2/26/2017  8:41 AM    Take 1 Tab by mouth every day.   Dose:  1 Tab       VITAMIN D PO    Take 1 Cap by mouth every day.   Dose:  1 Cap         STOP taking these medications     ibuprofen 200 MG Tabs   Commonly known as:  MOTRIN               Instructions           Diet / Nutrition:    Follow any diet instructions given to you by your doctor or the dietician, including how much salt (sodium) you are allowed each day.    If you are overweight, talk to your doctor about a weight reduction plan.    Activity:    Remain physically active following your doctor's instructions about exercise and activity.    Rest often.     Any time you become even a little tired or short of  breath, SIT DOWN and rest.    Worsening Symptoms:    Report any of the following signs and symptoms to the doctor's office immediately:    *Pain of jaw, arm, or neck  *Chest pain not relieved by medication                               *Dizziness or loss of consciousness  *Difficulty breathing even when at rest   *More tired than usual                                       *Bleeding drainage or swelling of surgical site  *Swelling of feet, ankles, legs or stomach                 *Fever (>100ºF)  *Pink or blood tinged sputum  *Weight gain (3lbs/day or 5lbs /week)           *Shock from internal defibrillator (if applicable)  *Palpitations or irregular heartbeats                *Cool and/or numb extremities    Stroke Awareness    Common Risk Factors for Stroke include:    Age  Atrial Fibrillation  Carotid Artery Stenosis  Diabetes Mellitus  Excessive alcohol consumption  High blood pressure  Overweight   Physical inactivity  Smoking    Warning signs and symptoms of a stroke include:    *Sudden numbness or weakness of the face, arm or leg (especially on one side of the body).  *Sudden confusion, trouble speaking or understanding.  *Sudden trouble seeing in one or both eyes.  *Sudden trouble walking, dizziness, loss of balance or coordination.Sudden severe headache with no known cause.    It is very important to get treatment quickly when a stroke occurs. If you experience any of the above warning signs, call 911 immediately.                   Disclaimer         Quit Smoking / Tobacco Use:    I understand the use of any tobacco products increases my chance of suffering from future heart disease or stroke and could cause other illnesses which may shorten my life. Quitting the use of tobacco products is the single most important thing I can do to improve my health. For further information on smoking / tobacco cessation call a Toll Free Quit Line at 1-271.169.4017 (*National [employer] Richards) or 1-985.397.3607 (American  Lung Association) or you can access the web based program at www.lungusa.org.    [place] Tobacco Users Help Line:  (847) 441-1989       Toll Free: 1-544.714.9053  Quit Tobacco Program [facility] (327)438-8556    Crisis Hotline:    [place] Crisis Hotline:  2-201-NVKITUY or 1-863.590.4041    [place] Crisis Hotline:    1-243.424.7840 or 632-026-3279    Discharge Survey:   Thank you for choosing [facility]. We hope we did everything we could to make your hospital stay a pleasant one. You may be receiving a phone survey and we would appreciate your time and participation in answering the questions. Your input is very valuable to us in our efforts to improve our service to our patients and their families.        My signature on this form indicates that:    1. I have reviewed and understand the above information.  2. My questions regarding this information have been answered to my satisfaction.  3. I have formulated a plan with my discharge nurse to obtain my prescribed medications for home.                  Disclaimer         __________________________________                     __________       ________                       Patient Signature                                                 Date                    Time

## 2017-02-21 NOTE — IP AVS SNAPSHOT
2/26/2017          Rodriguez Kramer  [address] [place] [address]    Dear Rodriguez:    On license of [facility] wants to ensure your discharge home is safe and you or your loved ones have had all your questions answered regarding your care after you leave the hospital.    You may receive a telephone call within two days of your discharge.  This call is to make certain you understand your discharge instructions as well as ensure we provided you with the best care possible during your stay with us.     The call will only last approximately 3-5 minutes and will be done by a nurse.    Once again, we want to ensure your discharge home is safe and that you have a clear understanding of any next steps in your care.  If you have any questions or concerns, please do not hesitate to contact us, we are here for you.  Thank you for choosing [facility] for your healthcare needs.    Sincerely,    Kirt Rizvi    [facility]

## 2017-02-21 NOTE — LETTER
MEDICAL/NEPHROLOGY [address] [address]  Phone: Dept: [phone] - Fax:        [facility] Progress Report and Disability Certification  Date of Service: 2/21/2017   No Show:  No  Date / Time of Next Visit:     Claim Information   Patient Name: Rodriguez Kramer  Claim Number:     Employer: [employer] Date of Injury: 12/3/2012     Insurer / TPA: [employer] Workers Comp ID / SSN: [id number]    Occupation:  Diagnosis: Diagnoses of Lumbar radiculopathy, acute and L4-L5 disc bulge were pertinent to this visit.    Medical Information   Related to Industrial Injury?      Subjective Complaints:      Objective Findings:     Pre-Existing Condition(s):     Assessment:        Status:    Permanent Disability:     Plan:      Diagnostics:      Comments:       Disability Information   Status:      From:     Through:   Restrictions are:     Physical Restrictions   Sitting:    Standing:    Stooping:    Bending:      Squatting:    Walking:    Climbing:    Pushing:      Pulling:    Other:    Reaching Above Shoulder (L):   Reaching Above Shoulder (R):       Reaching Below Shoulder (L):    Reaching Below Shoulder (R):      Not to exceed Weight Limits   Carrying(hrs):   Weight Limit(lb):   Lifting(hrs):   Weight  Limit(lb):     Comments:      Repetitive Actions   Hands: i.e. Fine Manipulations from Grasping:     Feet: i.e. Operating Foot Controls:     Driving / Operate Machinery:     Physician Name: Elen Abdi Physician Signature:   e-Signature:  , Medical Director   Clinic Name / Location: [facility]  MEDICAL/NEPHROLOGY [address] [id number] [phone]     Clinic Phone Number: Dept: [phone]   Appointment Time:  Visit Start Time:    Check-In Time:  1:18 PM Visit Discharge Time:    Original-Treating Physician or Chiropractor    Page 2-Insurer/TPA    Page 3-Employer    Page 4-Employee

## 2017-02-21 NOTE — IP AVS SNAPSHOT
" <p align=\"LEFT\"><IMG SRC=\"//[email or web address]$/[email or web address]\" alt=\"Image\" WIDTH=\"50%\" HEIGHT=\"200\" BORDER=\"\"></p>                   Name:Rodriguez Kramer  Medical Record Number:[id number]  CSN: [id number]    YOB: 1980   Age: 36 y.o.  Sex: male  HT:1.829 m (6' 0.01\") WT: 45.677 kg (100 lb 11.2 oz)          Admit Date: 2/21/2017     Discharge Date:   Today's Date: 2/26/2017  Attending Doctor:  Luke Whittaker M.D.                  Allergies:  Nkda          Follow-up Information     1. Follow up with Christopher Calderon M.D.. Schedule an appointment as soon as possible for a visit in 2 weeks.    Specialty:  Neurosurgery    Contact information    [address]  [address]  [place] [address]  [phone]          2. Follow up with TERRIE Bush In 2 weeks.    Specialty:  Family Medicine    Contact information    [address]  [address]  [place] [address]  [phone]           Medication List      Take these Medications        Instructions    albuterol 108 (90 BASE) MCG/ACT Aers inhalation aerosol    Inhale 2 Puffs by mouth every 6 hours as needed for Shortness of Breath.   Dose:  2 Puff       B-12 PO    Take 1 Tab by mouth every day.   Dose:  1 Tab        MG Caps    Take 100 mg by mouth 2 Times a Day. While taking pain medications   Dose:  100 mg       fish oil 1000 MG Caps capsule    Take 1,000 mg by mouth every day.   Dose:  1000 mg       gabapentin 300 MG Caps   Commonly known as:  NEURONTIN    Take 300 mg by mouth 3 times a day.   Dose:  300 mg       oxycodone immediate release 10 MG immediate release tablet   Commonly known as:  ROXICODONE    Take 1 Tab by mouth every four hours as needed for Severe Pain.   Dose:  10 mg       therapeutic multivitamin-minerals Tabs    Take 1 Tab by mouth every day.   Dose:  1 Tab       tizanidine 2 MG tablet   Commonly known as:  ZANAFLEX    Take 1 Tab by mouth 3 times a day as needed.   Dose:  2 mg       VITAMIN D PO    Take 1 Cap by mouth every day.   Dose:  1 Cap         "

## 2017-02-21 NOTE — ED NOTES
pT bib ems  Chief Complaint   Patient presents with   • Low Back Pain     chronic back pain, flaring last few days, severe pain at this time.  Non-ambulatory   DENIES ANY RECENT TRAUMA.  CHART UP FOR ERP

## 2017-02-22 PROBLEM — M54.16 LEFT LUMBAR RADICULOPATHY: Status: ACTIVE | Noted: 2017-02-22

## 2017-02-22 PROBLEM — E83.39 HYPOPHOSPHATEMIA: Status: ACTIVE | Noted: 2017-02-22

## 2017-02-22 LAB
ALBUMIN SERPL BCP-MCNC: 4.1 G/DL (ref 3.2–4.9)
APTT PPP: 26.1 SEC (ref 24.7–36)
BASOPHILS # BLD AUTO: 0.3 % (ref 0–1.8)
BASOPHILS # BLD: 0.04 K/UL (ref 0–0.12)
BUN SERPL-MCNC: 14 MG/DL (ref 8–22)
CALCIUM SERPL-MCNC: 9.6 MG/DL (ref 8.5–10.5)
CHLORIDE SERPL-SCNC: 103 MMOL/L (ref 96–112)
CO2 SERPL-SCNC: 25 MMOL/L (ref 20–33)
CREAT SERPL-MCNC: 0.68 MG/DL (ref 0.5–1.4)
EOSINOPHIL # BLD AUTO: 0 K/UL (ref 0–0.51)
EOSINOPHIL NFR BLD: 0 % (ref 0–6.9)
ERYTHROCYTE [DISTWIDTH] IN BLOOD BY AUTOMATED COUNT: 37.9 FL (ref 35.9–50)
GFR SERPL CREATININE-BSD FRML MDRD: >60 ML/MIN/1.73 M 2
GLUCOSE SERPL-MCNC: 165 MG/DL (ref 65–99)
HCT VFR BLD AUTO: 51.3 % (ref 42–52)
HGB BLD-MCNC: 17.7 G/DL (ref 14–18)
IMM GRANULOCYTES # BLD AUTO: 0.06 K/UL (ref 0–0.11)
IMM GRANULOCYTES NFR BLD AUTO: 0.4 % (ref 0–0.9)
INR PPP: 0.93 (ref 0.87–1.13)
LYMPHOCYTES # BLD AUTO: 1.01 K/UL (ref 1–4.8)
LYMPHOCYTES NFR BLD: 6.9 % (ref 22–41)
MCH RBC QN AUTO: 27.9 PG (ref 27–33)
MCHC RBC AUTO-ENTMCNC: 34.5 G/DL (ref 33.7–35.3)
MCV RBC AUTO: 80.9 FL (ref 81.4–97.8)
MONOCYTES # BLD AUTO: 0.09 K/UL (ref 0–0.85)
MONOCYTES NFR BLD AUTO: 0.6 % (ref 0–13.4)
NEUTROPHILS # BLD AUTO: 13.51 K/UL (ref 1.82–7.42)
NEUTROPHILS NFR BLD: 91.8 % (ref 44–72)
NRBC # BLD AUTO: 0 K/UL
NRBC BLD AUTO-RTO: 0 /100 WBC
PHOSPHATE SERPL-MCNC: 1.3 MG/DL (ref 2.5–4.5)
PLATELET # BLD AUTO: 381 K/UL (ref 164–446)
PMV BLD AUTO: 8.7 FL (ref 9–12.9)
POTASSIUM SERPL-SCNC: 4.1 MMOL/L (ref 3.6–5.5)
PROTHROMBIN TIME: 12.7 SEC (ref 12–14.6)
RBC # BLD AUTO: 6.34 M/UL (ref 4.7–6.1)
SODIUM SERPL-SCNC: 137 MMOL/L (ref 135–145)
WBC # BLD AUTO: 14.7 K/UL (ref 4.8–10.8)

## 2017-02-22 PROCEDURE — 700101 HCHG RX REV CODE 250: Performed by: HOSPITALIST

## 2017-02-22 PROCEDURE — 85730 THROMBOPLASTIN TIME PARTIAL: CPT

## 2017-02-22 PROCEDURE — 700105 HCHG RX REV CODE 258: Performed by: HOSPITALIST

## 2017-02-22 PROCEDURE — A9270 NON-COVERED ITEM OR SERVICE: HCPCS | Performed by: HOSPITALIST

## 2017-02-22 PROCEDURE — 99233 SBSQ HOSP IP/OBS HIGH 50: CPT | Performed by: HOSPITALIST

## 2017-02-22 PROCEDURE — 700102 HCHG RX REV CODE 250 W/ 637 OVERRIDE(OP): Performed by: FAMILY MEDICINE

## 2017-02-22 PROCEDURE — 36415 COLL VENOUS BLD VENIPUNCTURE: CPT

## 2017-02-22 PROCEDURE — 700112 HCHG RX REV CODE 229: Performed by: HOSPITALIST

## 2017-02-22 PROCEDURE — 85025 COMPLETE CBC W/AUTO DIFF WBC: CPT

## 2017-02-22 PROCEDURE — 700111 HCHG RX REV CODE 636 W/ 250 OVERRIDE (IP): Performed by: HOSPITALIST

## 2017-02-22 PROCEDURE — 85610 PROTHROMBIN TIME: CPT

## 2017-02-22 PROCEDURE — A9270 NON-COVERED ITEM OR SERVICE: HCPCS | Performed by: FAMILY MEDICINE

## 2017-02-22 PROCEDURE — 700102 HCHG RX REV CODE 250 W/ 637 OVERRIDE(OP): Performed by: HOSPITALIST

## 2017-02-22 PROCEDURE — 770006 HCHG ROOM/CARE - MED/SURG/GYN SEMI*

## 2017-02-22 PROCEDURE — 700102 HCHG RX REV CODE 250 W/ 637 OVERRIDE(OP): Performed by: INTERNAL MEDICINE

## 2017-02-22 PROCEDURE — 80069 RENAL FUNCTION PANEL: CPT

## 2017-02-22 PROCEDURE — A9270 NON-COVERED ITEM OR SERVICE: HCPCS | Performed by: INTERNAL MEDICINE

## 2017-02-22 RX ORDER — HEPARIN SODIUM 5000 [USP'U]/ML
5000 INJECTION, SOLUTION INTRAVENOUS; SUBCUTANEOUS EVERY 8 HOURS
Status: DISPENSED | OUTPATIENT
Start: 2017-02-22 | End: 2017-02-24

## 2017-02-22 RX ORDER — DIPHENHYDRAMINE HCL 25 MG
25 TABLET ORAL EVERY 6 HOURS PRN
Status: DISCONTINUED | OUTPATIENT
Start: 2017-02-22 | End: 2017-02-26 | Stop reason: HOSPADM

## 2017-02-22 RX ADMIN — DEXAMETHASONE SODIUM PHOSPHATE 4 MG: 4 INJECTION, SOLUTION INTRAMUSCULAR; INTRAVENOUS at 04:59

## 2017-02-22 RX ADMIN — HYDROMORPHONE HYDROCHLORIDE 1 MG: 1 INJECTION, SOLUTION INTRAMUSCULAR; INTRAVENOUS; SUBCUTANEOUS at 22:37

## 2017-02-22 RX ADMIN — OMEPRAZOLE 20 MG: 20 CAPSULE, DELAYED RELEASE ORAL at 06:02

## 2017-02-22 RX ADMIN — MORPHINE SULFATE 4 MG: 4 INJECTION INTRAVENOUS at 16:33

## 2017-02-22 RX ADMIN — OXYCODONE HYDROCHLORIDE 10 MG: 10 TABLET ORAL at 12:50

## 2017-02-22 RX ADMIN — PROMETHAZINE HYDROCHLORIDE 25 MG: 25 TABLET ORAL at 09:05

## 2017-02-22 RX ADMIN — MORPHINE SULFATE 4 MG: 4 INJECTION INTRAVENOUS at 04:59

## 2017-02-22 RX ADMIN — STANDARDIZED SENNA CONCENTRATE AND DOCUSATE SODIUM 1 TABLET: 8.6; 5 TABLET, FILM COATED ORAL at 20:44

## 2017-02-22 RX ADMIN — DOCUSATE SODIUM 100 MG: 100 CAPSULE ORAL at 09:14

## 2017-02-22 RX ADMIN — HEPARIN SODIUM 5000 UNITS: 5000 INJECTION, SOLUTION INTRAVENOUS; SUBCUTANEOUS at 15:00

## 2017-02-22 RX ADMIN — OXYCODONE HYDROCHLORIDE 10 MG: 10 TABLET ORAL at 19:32

## 2017-02-22 RX ADMIN — HEPARIN SODIUM 5000 UNITS: 5000 INJECTION, SOLUTION INTRAVENOUS; SUBCUTANEOUS at 20:43

## 2017-02-22 RX ADMIN — SODIUM PHOSPHATE, MONOBASIC, MONOHYDRATE AND SODIUM PHOSPHATE, DIBASIC, ANHYDROUS 30 MMOL: 276; 142 INJECTION, SOLUTION INTRAVENOUS at 16:33

## 2017-02-22 RX ADMIN — OXYCODONE HYDROCHLORIDE 10 MG: 10 TABLET ORAL at 06:01

## 2017-02-22 RX ADMIN — DEXAMETHASONE SODIUM PHOSPHATE 4 MG: 4 INJECTION, SOLUTION INTRAMUSCULAR; INTRAVENOUS at 12:50

## 2017-02-22 RX ADMIN — OXYCODONE HYDROCHLORIDE 10 MG: 10 TABLET ORAL at 09:06

## 2017-02-22 RX ADMIN — MORPHINE SULFATE 4 MG: 4 INJECTION INTRAVENOUS at 20:44

## 2017-02-22 RX ADMIN — MORPHINE SULFATE 4 MG: 4 INJECTION INTRAVENOUS at 12:50

## 2017-02-22 RX ADMIN — CYANOCOBALAMIN TAB 500 MCG 1000 MCG: 500 TAB at 09:05

## 2017-02-22 RX ADMIN — MORPHINE SULFATE 4 MG: 4 INJECTION INTRAVENOUS at 09:04

## 2017-02-22 RX ADMIN — DIPHENHYDRAMINE HCL 25 MG: 25 TABLET ORAL at 20:44

## 2017-02-22 RX ADMIN — DEXAMETHASONE SODIUM PHOSPHATE 4 MG: 4 INJECTION, SOLUTION INTRAMUSCULAR; INTRAVENOUS at 18:10

## 2017-02-22 RX ADMIN — DOCUSATE SODIUM 100 MG: 100 CAPSULE ORAL at 20:44

## 2017-02-22 ASSESSMENT — PAIN SCALES - GENERAL
PAINLEVEL_OUTOF10: 10
PAINLEVEL_OUTOF10: 5
PAINLEVEL_OUTOF10: 8
PAINLEVEL_OUTOF10: 10
PAINLEVEL_OUTOF10: 10
PAINLEVEL_OUTOF10: 8
PAINLEVEL_OUTOF10: 7

## 2017-02-22 ASSESSMENT — ENCOUNTER SYMPTOMS
CONSTIPATION: 0
COUGH: 0
VOMITING: 0
DIARRHEA: 0
BACK PAIN: 1
SHORTNESS OF BREATH: 0
FEVER: 0
CHILLS: 0
WHEEZING: 0
NAUSEA: 0

## 2017-02-22 NOTE — PROGRESS NOTES
NEUROSURGERY:    Consult dictated  Left Lumbar radiculopathy that sig worsened yest after coughing  Has disc protrusion left L4-5 w/ caudal disc fragment compressing the L5 root  Will plan surgery for a few days from now and let things settle down for now w/ steroids  Continue steroids  Last dose ibuprofen yesterday  D/c PRN motrin   If pt improves he can discharge home anytime and follow up as an outpatient

## 2017-02-22 NOTE — ED NOTES
"Med rec updated and complete  Allergies reviewed  Pt states \"I smoke MARIJUANA 3 times a day, last time I inhaled was today (2/21/2017) 1100\".  Pt states \"No antibiotics in the last 30 days\".    "

## 2017-02-22 NOTE — PROGRESS NOTES
Hospital Medicine Progress Note, Adult, Complex               Author: Nain DOTSON Inocencio Date & Time created: 2/22/2017  12:58 PM     Interval History:  CC: intractable low back pain  2/22: Seen by NSG. Cont to have low back pain. HypoPO4: repleting IV. PPx: added sc hep    Review of Systems:  Review of Systems   Constitutional: Positive for malaise/fatigue. Negative for fever and chills.   Respiratory: Negative for cough, shortness of breath and wheezing.    Cardiovascular: Negative for chest pain.   Gastrointestinal: Negative for nausea, vomiting, diarrhea and constipation.   Musculoskeletal: Positive for back pain.     Physical Exam:  Physical Exam   Constitutional: He appears well-developed.   HENT:   Head: Normocephalic.   Eyes: Conjunctivae are normal.   Cardiovascular: Normal rate.  Exam reveals no gallop.    Pulmonary/Chest: No respiratory distress. He has no wheezes.   Abdominal: He exhibits no distension. There is no tenderness.   Musculoskeletal: He exhibits tenderness (back).   Neurological: He is alert. Abnormal muscle tone: no saddle anesth.       Labs:        Invalid input(s): [id number]      Recent Labs      02/21/17   1345  02/22/17 0451   SODIUM  137  137   POTASSIUM  4.1  4.1   CHLORIDE  108  103   CO2  22  25   BUN  17  14   CREATININE  0.87  0.68   PHOSPHORUS   --   1.3*   CALCIUM  8.8  9.6     Recent Labs      02/21/17   1345  02/22/17   0451   ALTSGPT  11   --    ASTSGOT  14   --    ALKPHOSPHAT  53   --    TBILIRUBIN  0.4   --    GLUCOSE  95  165*     Recent Labs      02/21/17   1345  02/22/17   0451  02/22/17   0814   RBC  5.67  6.34*   --    HEMOGLOBIN  16.1  17.7   --    HEMATOCRIT  46.2  51.3   --    PLATELETCT  324  381   --    PROTHROMBTM   --    --   12.7   APTT   --    --   26.1   INR   --    --   0.93     Recent Labs      02/21/17   1345  02/22/17   0451   WBC  8.1  14.7*   NEUTSPOLYS  51.10  91.80*   LYMPHOCYTES  38.20  6.90*   MONOCYTES  6.20  0.60   EOSINOPHILS  3.30  0.00    BASOPHILS  0.70  0.30   ASTSGOT  14   --    ALTSGPT  11   --    ALKPHOSPHAT  53   --    TBILIRUBIN  0.4   --            Hemodynamics:  Temp (24hrs), Av.8 °C (98.3 °F), Min:36.6 °C (97.9 °F), Max:37.2 °C (98.9 °F)  Temperature: 36.7 °C (98.1 °F)  Pulse  Av.4  Min: 70  Max: 105   Blood Pressure: 120/84 mmHg, NIBP: 107/60 mmHg     Respiratory:    Respiration: 18, Pulse Oximetry: 94 %           Fluids:    Intake/Output Summary (Last 24 hours) at 17 1258  Last data filed at 17 1200   Gross per 24 hour   Intake    720 ml   Output    800 ml   Net    -80 ml     Weight: 94.8 kg (208 lb 15.9 oz)  GI/Nutrition:  Orders Placed This Encounter   Procedures   • Diet Order     Standing Status: Standing      Number of Occurrences: 1      Standing Expiration Date:      Order Specific Question:  Diet:     Answer:  Regular [1]     Medical Decision Making, by Problem:  Active Hospital Problems    Diagnosis   • *Intractable low back pain [M54.5]  - Lumbar nerve root impingement seen on MRI  - Neurosurgery Dr. Calderon consulted  - Steroids of dexamethasone for every 6 per neurosurgery's directions  - possible surgery in several days   • Hypophosphatemia [E83.39]  - repleting   • Left lumbar radiculopathy [M54.16]  - IV morphine and oral oxycodone   • Lumbar nerve root compression [M54.16]     Labs reviewed and Medications reviewed  Alejandro catheter: No Alejandro      DVT Prophylaxis: Heparin  DVT prophylaxis - mechanical: SCDs

## 2017-02-22 NOTE — PROGRESS NOTES
Alert and able to let his needs known; admitted from the ER last night in pain and tears. Difficulty walking due to leg weakness and lower back pain; medicated as requested. Admit profile completed, orient to room and call light within reach.    C/o gerd; orders received.  Requesting for his omeprazole to be given early this morning due to ineffectiveness of the mylanta

## 2017-02-22 NOTE — CARE PLAN
Problem: Nutritional:  Goal: Achieve adequate nutritional intake  Patient will have an understanding of diet education provided  Outcome: MET Date Met:  02/22/17  Provided pt with general healthy nutrition therapy and high cholesterol nutrition therapy education and handouts from the Academy of Nutrition and Dietetics diet manual.   Also provided pt with contact info for outpatient nutrition services.

## 2017-02-22 NOTE — H&P
HOSPITAL MEDICINE HISTORY/ PHYSICAL    Date & Time note created:    2/21/2017   6:25 PM       Patient ID:   Name: Rodriguez Kramer. YOB: 1980. Age: 36 y.o. male. MRN: [id number]    Admitting Attending:  Nain Painter     PCP : TERRIE Bush    Outpatient neurosurgeon: Dr. Calderon        Chief Complaint:       Intractable low back pain    History of Present Illness:    Nia is a 36 y.o. male w/h/o bulging disks who presents with intractable low back pain. Patient said this started in 2012 when he fell at work. In 2014 he had surgery. In 2015 he had another surgery. He then continued to have some back pain but it got a lot worse recently. Then, today he got up and he coughed and this made the pain a lot worse. He was hunched over for most of the day and then suddenly the pain got so bad he had to come to the hospital. Nothing really made it better except certain positions make it temporarily better but then the pain gets worse again.    Review of Systems:    Has nausea and leg numbness. No bowel or bladder incontinence. No saddle anesthesia  Please see HPI, all other systems were reviewed and are negative (AMA/CMS criteria)              Past Medical/ Family / Social history (PFSH):   Past Medical History   Diagnosis Date   • ASTHMA    • Bulging discs 12/2012     Past Surgical History   Procedure Laterality Date   • Lumbar laminectomy diskectomy  11/25/08     Performed by SHAUNA JENKINS at [facility]   • Other neurological surg       L 4-5 discectomy   • Lumbar laminectomy diskectomy  8/3/2014     Performed by Christopher Calderon M.D. at [facility]   • Lumbar fusion posterior  6/15/2015     Procedure: LUMBAR FUSION POSTERIOR;  Surgeon: Christophre Calderon M.D.;  Location: [facility];  Service:      Current Outpatient Medications:  No current facility-administered medications on file prior to encounter.     No current outpatient prescriptions on file prior to  "encounter.     Medication Allergy/Sensitivities:  Allergies   Allergen Reactions   • Nkda [No Known Drug Allergy]      Family History:  No family history on file.   Mother had diabetes and lupus  Father had diabetes    Social History:  Social History   Substance Use Topics   • Smoking status: Current Every Day Smoker -- 0.20 packs/day     Types: Cigarettes   • Smokeless tobacco: Not on file   • Alcohol Use: No     #################################################################  Physical Exam:   Vitals/ General Appearance:   Weight/BMI: Body mass index is 29.83 kg/(m^2).  Blood pressure 136/88, pulse 101, temperature 37.2 °C (98.9 °F), resp. rate 18, height 1.829 m (6' 0.01\"), weight 99.791 kg (220 lb).   Filed Vitals:    02/21/17 1321 02/21/17 1322   BP:  136/88   Pulse:  101   Temp:  37.2 °C (98.9 °F)   Resp:  18   Height: 1.829 m (6' 0.01\")    Weight: 99.791 kg (220 lb)     Oxygen Therapy:       Constitutional:  well developed, well nourished  HENMT: Normocephalic, atraumatic, b/l ears normal, nose normal  Eyes:  EOMI, conjunctiva normal, no discharge  Neck: no tracheal deviation, supple  Cardiovascular: tachycardic, normal rhythm, no murmurs, no rubs or gallops; no cyanosis, clubbing or edema  Lungs: Respiratory effort is normal, normal breath sounds, breath sounds clear to auscultation b/l, no rales, rhonchi or wheezing  Abdomen: soft, non-tender, no guarding or rebound  Back: Tender to palpation  Skin: warm, dry, no erythema, no rash  Neurologic: Alert and oriented, no saddle anesthesia. Patient describes a numbness on the left leg.  Psychiatric: Some anxiety or depression    #################################################################  Lab Data Review:    Objective  Recent Results (from the past 24 hour(s))   CBC WITH DIFFERENTIAL    Collection Time: 02/21/17  1:45 PM   Result Value Ref Range    WBC 8.1 4.8 - 10.8 K/uL    RBC 5.67 4.70 - 6.10 M/uL    Hemoglobin 16.1 14.0 - 18.0 g/dL    Hematocrit 46.2 " 42.0 - 52.0 %    MCV 81.5 81.4 - 97.8 fL    MCH 28.4 27.0 - 33.0 pg    MCHC 34.8 33.7 - 35.3 g/dL    RDW 38.2 35.9 - 50.0 fL    Platelet Count 324 164 - 446 K/uL    MPV 9.0 9.0 - 12.9 fL    Neutrophils-Polys 51.10 44.00 - 72.00 %    Lymphocytes 38.20 22.00 - 41.00 %    Monocytes 6.20 0.00 - 13.40 %    Eosinophils 3.30 0.00 - 6.90 %    Basophils 0.70 0.00 - 1.80 %    Immature Granulocytes 0.50 0.00 - 0.90 %    Nucleated RBC 0.00 /100 WBC    Neutrophils (Absolute) 4.15 1.82 - 7.42 K/uL    Lymphs (Absolute) 3.10 1.00 - 4.80 K/uL    Monos (Absolute) 0.50 0.00 - 0.85 K/uL    Eos (Absolute) 0.27 0.00 - 0.51 K/uL    Baso (Absolute) 0.06 0.00 - 0.12 K/uL    Immature Granulocytes (abs) 0.04 0.00 - 0.11 K/uL    NRBC (Absolute) 0.00 K/uL   CMP    Collection Time: 02/21/17  1:45 PM   Result Value Ref Range    Sodium 137 135 - 145 mmol/L    Potassium 4.1 3.6 - 5.5 mmol/L    Chloride 108 96 - 112 mmol/L    Co2 22 20 - 33 mmol/L    Anion Gap 7.0 0.0 - 11.9    Glucose 95 65 - 99 mg/dL    Bun 17 8 - 22 mg/dL    Creatinine 0.87 0.50 - 1.40 mg/dL    Calcium 8.8 8.5 - 10.5 mg/dL    AST(SGOT) 14 12 - 45 U/L    ALT(SGPT) 11 2 - 50 U/L    Alkaline Phosphatase 53 30 - 99 U/L    Total Bilirubin 0.4 0.1 - 1.5 mg/dL    Albumin 3.6 3.2 - 4.9 g/dL    Total Protein 6.3 6.0 - 8.2 g/dL    Globulin 2.7 1.9 - 3.5 g/dL    A-G Ratio 1.3 g/dL   ESTIMATED GFR    Collection Time: 02/21/17  1:45 PM   Result Value Ref Range    GFR If African American >60 >60 mL/min/1.73 m 2    GFR If Non African American >60 >60 mL/min/1.73 m 2   URINALYSIS,CULTURE IF INDICATED    Collection Time: 02/21/17  4:00 PM   Result Value Ref Range    Color Yellow     Character Clear     Specific Gravity 1.015 <1.035    Ph 6.0 5.0-8.0    Glucose Negative Negative mg/dL    Ketones Negative Negative mg/dL    Protein Negative Negative mg/dL    Bilirubin Negative Negative    Nitrite Negative Negative    Leukocyte Esterase Negative Negative    Occult Blood Negative Negative    Micro  Urine Req see below     Culture Indicated No UA Culture       (click the triangle to expand results)  My interpretation of lab results:   Labs unremarkable  Imaging/Procedures Review:    MR-LUMBAR SPINE-WITH & W/O   Final Result      1.  Transitional lumbosacral junction. Partial sacralization of L5. Developmentally diminutive L5-S1 disc space.   2.  L3-4 postoperative lumbar laminectomy. Interval posterior fusion with transpedicular screw fixation at L3-L4.   3.  L3-4 central and left paramedian disc-osteophyte complex, primarily spur. Moderate left lateral recess stenosis. Aberrant soft tissue the left lateral recess which may represent epidural scarring perhaps with some underlying hypoenhancing disc    material. Borderline left inferior foraminal stenosis.   4.  L4-5 small central and right paramedian disc protrusion. Moderate sized left-sided caudad disc extrusion or free fragment. New finding since the previous exam. Compromise of the left L5 nerve root and also impingement on the thecal sac to the    emerging left S1 nerve root. Persisting hypoenhancing aberrant soft tissue at the right lateral recess which was seen on the previous exam as a moderate-sized right-sided disc extrusion or free fragment.   5.  L5-S1 no significant disc bulge or protrusion. No central or foraminal stenosis at the L5-S1 disc space level.        Assessment and Plan:      1. Intractable low back pain  - Lumbar nerve root impingement seen on MRI  - Neurosurgery Dr. Calderon consulted  - Steroids of dexamethasone for every 6 per neurosurgery's directions  - Nothing by mouth midnight in case neurosurgery would like to procedure tomorrow  2. Pain control  - IV morphine and oral oxycodone  - zofran PRN   3. Anxiety  - Reassured patient  4. Prophylaxis: SCDs  5. Code: Full code per patient   6. Dispo: He will be admitted to inpatient for management that is expected to take greater than 2 midnights

## 2017-02-22 NOTE — RESPIRATORY CARE
COPD EDUCATION by COPD CLINICAL EDUCATOR  2/22/2017 at 7:22 AM by Ronda Pérez     Patient reviewed by COPD education team. Patient does not qualify for COPD program.

## 2017-02-22 NOTE — PROGRESS NOTES
Received. Nad noted and n/c voiced at this time. Denies any needs or concerns. Bed low, locked, side rails up x 3, call light in reach.

## 2017-02-22 NOTE — CONSULTS
DATE OF SERVICE:  02/22/2017    CONSULTING PHYSICIAN:  Dr. Christopher Calderon.    CHIEF COMPLAINT:  Left lumbar radiculopathy with numbness and weakness.    HISTORY OF PRESENT ILLNESS:  The patient is a 36-year-old gentleman who is a   patient of Dr. Calderon's.  He fell at work initially in 2012 and he has had 2   prior lumbar spine surgeries and stemming from that fall, he has an   instrumented fusion L3-4.  He reports that he has had progressively worsening   left leg pain and low back pain and yesterday morning he coughed and had a   sudden severe pain radiating down his left leg that brought him to his knees.    He was unable to ambulate secondary to severe pain with numbness and feeling   of weakness.  He ultimately was transported to [facility] via ambulance.  He   underwent an MRI of the lumbar spine, which does show a left caudate disk   extrusion at L4-5 compress compromising the traversing left L5 nerve root.    The patient was admitted for pain control by the hospitalist physicians and   placed on IV Decadron.    PAST MEDICAL HISTORY:  Asthma.    PAST SURGICAL HISTORY:  Lumbar laminectomy and diskectomy with Dr. Hickman in   2008, L4-L5 lumbar microdiskectomy in 2014 with Dr. Calderon, at L3-4   instrumented fusion with Dr. Calderon in 2015.    REVIEW OF SYSTEMS:  Positive for numbness down the left leg, negative for any   bowel or bladder dysfunction, negative for any right-sided lumbar radicular   symptoms.    MEDICATIONS:  P.r.n. ibuprofen, last dose yesterday 02/21/2017.    ALLERGIES TO MEDICATIONS:  No known drug allergies.    FAMILY HISTORY:  Mother for her diabetes and lupus.  Father diabetes.    SOCIAL HISTORY:  He is .  He smokes cigarettes and also marijuana.  He   denies alcohol use.    PHYSICAL EXAMINATION:  VITAL SIGNS:  Blood pressure 136/71, pulse 70, respiratory rate 16,   temperature 97.9, room air saturation 95%.  GENERAL APPEARANCE:  Well-groomed, well-nourished male.  INTEGUMENTARY:  Skin is  intact without lesions or deformities noted.  HEENT:  Atraumatic, normocephalic.  CARDIOVASCULAR:  Heart rate regular.  PULMONARY:  Lungs are clear.  No shortness of breath.  NEUROLOGIC:  The patient is awake, alert, oriented.  Speech is clear and   fluent with fund of knowledge.  His strength in his upper extremities and his   right lower extremity is 5/5.  His left dorsiflexion, plantar flexion 4+/5,   unable to assess his quadriceps or iliopsoas strength secondary to severe pain   on examination diminished sensation left L5 distribution.    DIAGNOSTIC STUDIES:  MRI lumbar spine prior instrumented fusion L3-4 with   Modic changes, L3 and L4 he has caudate disk extrusion on the left at L4-L5   compressing the traversing L5 root.    LABORATORY DATA:  His WBC count is 14.7, this is after IV steroids.  His UA is   negative.  His chem panel is normal with the exception of slightly elevated   glucose of 165.    ASSESSMENT AND PLAN:  The patient is admitted for pain control with the severe   left lumbar radiculopathy with disk extrusion at L4-L5 on the left and we   will continue him on steroids.  We will let him eat today and we will plan to   schedule him for surgery in the next few days after this settles down some   with the steroids.  We will keep him off of any nonsteroidal anti-inflammatory   medications.  Pending surgery, his last dose of ibuprofen was yesterday.  We   will go ahead and also check his coags.       ____________________________________     DORIS PAREKH / MASOUD    DD:  02/22/2017 07:53:01  DT:  02/22/2017 12:15:11    D#:  [id number]  Job#:  [id number]

## 2017-02-22 NOTE — CARE PLAN
Problem: Mobility  Goal: Risk for activity intolerance will decrease  sba needed; weakness due to lower backpain    Problem: Pain Management  Goal: Pain level will decrease to patient’s comfort goal  Medicate as requested for lower back pain

## 2017-02-22 NOTE — ED NOTES
Assumed care of patient, report from Mayra RN while awaiting transport to room. Report called to floor RN.

## 2017-02-23 ENCOUNTER — APPOINTMENT (OUTPATIENT)
Dept: RADIOLOGY | Facility: MEDICAL CENTER | Age: 37
DRG: 520 | End: 2017-02-23
Attending: CLINICAL NURSE SPECIALIST
Payer: OTHER MISCELLANEOUS

## 2017-02-23 LAB
ALBUMIN SERPL BCP-MCNC: 4.2 G/DL (ref 3.2–4.9)
BUN SERPL-MCNC: 14 MG/DL (ref 8–22)
CALCIUM SERPL-MCNC: 9.5 MG/DL (ref 8.5–10.5)
CFT BLD TEG: 6.2 MIN (ref 5–10)
CHLORIDE SERPL-SCNC: 101 MMOL/L (ref 96–112)
CLOT ANGLE BLD TEG: 61.4 DEGREES (ref 53–72)
CLOT LYSIS 30M P MA LENFR BLD TEG: 3.5 % (ref 0–8)
CO2 SERPL-SCNC: 23 MMOL/L (ref 20–33)
CREAT SERPL-MCNC: 0.79 MG/DL (ref 0.5–1.4)
CT.EXTRINSIC BLD ROTEM: 2.2 MIN (ref 1–3)
ERYTHROCYTE [DISTWIDTH] IN BLOOD BY AUTOMATED COUNT: 39.1 FL (ref 35.9–50)
GFR SERPL CREATININE-BSD FRML MDRD: >60 ML/MIN/1.73 M 2
GLUCOSE SERPL-MCNC: 139 MG/DL (ref 65–99)
HCT VFR BLD AUTO: 52.1 % (ref 42–52)
HGB BLD-MCNC: 17.5 G/DL (ref 14–18)
MAGNESIUM SERPL-MCNC: 2.1 MG/DL (ref 1.5–2.5)
MCF BLD TEG: 55.9 MM (ref 50–70)
MCH RBC QN AUTO: 27.6 PG (ref 27–33)
MCHC RBC AUTO-ENTMCNC: 33.6 G/DL (ref 33.7–35.3)
MCV RBC AUTO: 82 FL (ref 81.4–97.8)
PA AA BLD-ACNC: 0 %
PA ADP BLD-ACNC: 90.9 %
PHOSPHATE SERPL-MCNC: 3.6 MG/DL (ref 2.5–4.5)
PLATELET # BLD AUTO: 414 K/UL (ref 164–446)
PMV BLD AUTO: 9.2 FL (ref 9–12.9)
POTASSIUM SERPL-SCNC: 3.8 MMOL/L (ref 3.6–5.5)
RBC # BLD AUTO: 6.35 M/UL (ref 4.7–6.1)
SODIUM SERPL-SCNC: 136 MMOL/L (ref 135–145)
TEG ALGORITHM TGALG: NORMAL
WBC # BLD AUTO: 24.6 K/UL (ref 4.8–10.8)

## 2017-02-23 PROCEDURE — 700112 HCHG RX REV CODE 229: Performed by: HOSPITALIST

## 2017-02-23 PROCEDURE — A9270 NON-COVERED ITEM OR SERVICE: HCPCS | Performed by: HOSPITALIST

## 2017-02-23 PROCEDURE — 93005 ELECTROCARDIOGRAM TRACING: CPT | Performed by: CLINICAL NURSE SPECIALIST

## 2017-02-23 PROCEDURE — 85027 COMPLETE CBC AUTOMATED: CPT

## 2017-02-23 PROCEDURE — 85576 BLOOD PLATELET AGGREGATION: CPT | Mod: 91

## 2017-02-23 PROCEDURE — 85347 COAGULATION TIME ACTIVATED: CPT

## 2017-02-23 PROCEDURE — 770006 HCHG ROOM/CARE - MED/SURG/GYN SEMI*

## 2017-02-23 PROCEDURE — 700101 HCHG RX REV CODE 250: Performed by: CLINICAL NURSE SPECIALIST

## 2017-02-23 PROCEDURE — 700102 HCHG RX REV CODE 250 W/ 637 OVERRIDE(OP): Performed by: INTERNAL MEDICINE

## 2017-02-23 PROCEDURE — 700102 HCHG RX REV CODE 250 W/ 637 OVERRIDE(OP): Performed by: HOSPITALIST

## 2017-02-23 PROCEDURE — 80069 RENAL FUNCTION PANEL: CPT

## 2017-02-23 PROCEDURE — 85384 FIBRINOGEN ACTIVITY: CPT

## 2017-02-23 PROCEDURE — 700111 HCHG RX REV CODE 636 W/ 250 OVERRIDE (IP): Performed by: HOSPITALIST

## 2017-02-23 PROCEDURE — A9270 NON-COVERED ITEM OR SERVICE: HCPCS | Performed by: FAMILY MEDICINE

## 2017-02-23 PROCEDURE — A9270 NON-COVERED ITEM OR SERVICE: HCPCS | Performed by: INTERNAL MEDICINE

## 2017-02-23 PROCEDURE — 71020 DX-CHEST-2 VIEWS: CPT

## 2017-02-23 PROCEDURE — 99233 SBSQ HOSP IP/OBS HIGH 50: CPT | Performed by: HOSPITALIST

## 2017-02-23 PROCEDURE — 93010 ELECTROCARDIOGRAM REPORT: CPT | Performed by: INTERNAL MEDICINE

## 2017-02-23 PROCEDURE — 83735 ASSAY OF MAGNESIUM: CPT

## 2017-02-23 PROCEDURE — 700102 HCHG RX REV CODE 250 W/ 637 OVERRIDE(OP): Performed by: FAMILY MEDICINE

## 2017-02-23 PROCEDURE — 36415 COLL VENOUS BLD VENIPUNCTURE: CPT

## 2017-02-23 RX ORDER — MORPHINE SULFATE 15 MG/1
15 TABLET, FILM COATED, EXTENDED RELEASE ORAL EVERY 12 HOURS
Status: DISCONTINUED | OUTPATIENT
Start: 2017-02-23 | End: 2017-02-26 | Stop reason: HOSPADM

## 2017-02-23 RX ORDER — DIAZEPAM 5 MG/1
5 TABLET ORAL EVERY 6 HOURS PRN
Status: DISCONTINUED | OUTPATIENT
Start: 2017-02-23 | End: 2017-02-26 | Stop reason: HOSPADM

## 2017-02-23 RX ORDER — SODIUM CHLORIDE AND POTASSIUM CHLORIDE 150; 900 MG/100ML; MG/100ML
INJECTION, SOLUTION INTRAVENOUS CONTINUOUS
Status: DISCONTINUED | OUTPATIENT
Start: 2017-02-23 | End: 2017-02-24

## 2017-02-23 RX ADMIN — HEPARIN SODIUM 5000 UNITS: 5000 INJECTION, SOLUTION INTRAVENOUS; SUBCUTANEOUS at 06:08

## 2017-02-23 RX ADMIN — OXYCODONE HYDROCHLORIDE 10 MG: 10 TABLET ORAL at 21:53

## 2017-02-23 RX ADMIN — STANDARDIZED SENNA CONCENTRATE AND DOCUSATE SODIUM 1 TABLET: 8.6; 5 TABLET, FILM COATED ORAL at 20:25

## 2017-02-23 RX ADMIN — HYDROMORPHONE HYDROCHLORIDE 1 MG: 1 INJECTION, SOLUTION INTRAMUSCULAR; INTRAVENOUS; SUBCUTANEOUS at 15:01

## 2017-02-23 RX ADMIN — HYDROMORPHONE HYDROCHLORIDE 1 MG: 1 INJECTION, SOLUTION INTRAMUSCULAR; INTRAVENOUS; SUBCUTANEOUS at 04:17

## 2017-02-23 RX ADMIN — OXYCODONE HYDROCHLORIDE 10 MG: 10 TABLET ORAL at 17:10

## 2017-02-23 RX ADMIN — HYDROMORPHONE HYDROCHLORIDE 1 MG: 1 INJECTION, SOLUTION INTRAMUSCULAR; INTRAVENOUS; SUBCUTANEOUS at 20:25

## 2017-02-23 RX ADMIN — DIPHENHYDRAMINE HCL 25 MG: 25 TABLET ORAL at 06:13

## 2017-02-23 RX ADMIN — HEPARIN SODIUM 5000 UNITS: 5000 INJECTION, SOLUTION INTRAVENOUS; SUBCUTANEOUS at 14:48

## 2017-02-23 RX ADMIN — DIPHENHYDRAMINE HCL 25 MG: 25 TABLET ORAL at 17:09

## 2017-02-23 RX ADMIN — HYDROMORPHONE HYDROCHLORIDE 1 MG: 1 INJECTION, SOLUTION INTRAMUSCULAR; INTRAVENOUS; SUBCUTANEOUS at 08:23

## 2017-02-23 RX ADMIN — DEXAMETHASONE SODIUM PHOSPHATE 4 MG: 4 INJECTION, SOLUTION INTRAMUSCULAR; INTRAVENOUS at 01:04

## 2017-02-23 RX ADMIN — HYDROMORPHONE HYDROCHLORIDE 1 MG: 1 INJECTION, SOLUTION INTRAMUSCULAR; INTRAVENOUS; SUBCUTANEOUS at 23:13

## 2017-02-23 RX ADMIN — OMEPRAZOLE 20 MG: 20 CAPSULE, DELAYED RELEASE ORAL at 08:23

## 2017-02-23 RX ADMIN — OXYCODONE HYDROCHLORIDE 10 MG: 10 TABLET ORAL at 06:13

## 2017-02-23 RX ADMIN — DEXAMETHASONE SODIUM PHOSPHATE 4 MG: 4 INJECTION, SOLUTION INTRAMUSCULAR; INTRAVENOUS at 12:07

## 2017-02-23 RX ADMIN — CYANOCOBALAMIN TAB 500 MCG 1000 MCG: 500 TAB at 08:23

## 2017-02-23 RX ADMIN — DIAZEPAM 5 MG: 5 TABLET ORAL at 21:53

## 2017-02-23 RX ADMIN — DOCUSATE SODIUM 100 MG: 100 CAPSULE ORAL at 20:25

## 2017-02-23 RX ADMIN — DIAZEPAM 5 MG: 5 TABLET ORAL at 12:07

## 2017-02-23 RX ADMIN — DEXAMETHASONE SODIUM PHOSPHATE 4 MG: 4 INJECTION, SOLUTION INTRAMUSCULAR; INTRAVENOUS at 06:08

## 2017-02-23 RX ADMIN — MORPHINE SULFATE 15 MG: 15 TABLET, EXTENDED RELEASE ORAL at 20:25

## 2017-02-23 RX ADMIN — POTASSIUM CHLORIDE AND SODIUM CHLORIDE: 900; 150 INJECTION, SOLUTION INTRAVENOUS at 23:10

## 2017-02-23 RX ADMIN — DOCUSATE SODIUM 100 MG: 100 CAPSULE ORAL at 08:23

## 2017-02-23 RX ADMIN — MORPHINE SULFATE 15 MG: 15 TABLET, EXTENDED RELEASE ORAL at 10:46

## 2017-02-23 RX ADMIN — DEXAMETHASONE SODIUM PHOSPHATE 4 MG: 4 INJECTION, SOLUTION INTRAMUSCULAR; INTRAVENOUS at 17:35

## 2017-02-23 RX ADMIN — HYDROMORPHONE HYDROCHLORIDE 1 MG: 1 INJECTION, SOLUTION INTRAMUSCULAR; INTRAVENOUS; SUBCUTANEOUS at 12:07

## 2017-02-23 ASSESSMENT — ENCOUNTER SYMPTOMS
BACK PAIN: 1
SHORTNESS OF BREATH: 0
FEVER: 0
DIARRHEA: 0
NAUSEA: 0
VOMITING: 0
CONSTIPATION: 0

## 2017-02-23 ASSESSMENT — PATIENT HEALTH QUESTIONNAIRE - PHQ9
2. FEELING DOWN, DEPRESSED, IRRITABLE, OR HOPELESS: NOT AT ALL
1. LITTLE INTEREST OR PLEASURE IN DOING THINGS: NOT AT ALL
SUM OF ALL RESPONSES TO PHQ QUESTIONS 1-9: 0
SUM OF ALL RESPONSES TO PHQ9 QUESTIONS 1 AND 2: 0

## 2017-02-23 ASSESSMENT — PAIN SCALES - GENERAL
PAINLEVEL_OUTOF10: 8
PAINLEVEL_OUTOF10: 7
PAINLEVEL_OUTOF10: 8
PAINLEVEL_OUTOF10: 8
PAINLEVEL_OUTOF10: 9

## 2017-02-23 NOTE — CARE PLAN
Problem: Communication  Goal: The ability to communicate needs accurately and effectively will improve  Outcome: PROGRESSING AS EXPECTED  Patient able to communicate effectively.    Problem: Pain Management  Goal: Pain level will decrease to patient’s comfort goal  Outcome: PROGRESSING SLOWER THAN EXPECTED  Patient's pharmacologic pain management required adjustment over the evening.

## 2017-02-23 NOTE — PROGRESS NOTES
"Neurosurgery :     Exam:   C/o ongoing left leg pain, numbness with weakness, now tells me he cannot move his foot, too much pain to check quad/ip and no mvmt on DF/PF, walked to [facility] station yest but states it was very difficult, voiding ok    Blood pressure 139/76, pulse 91, temperature 36.9 °C (98.4 °F), resp. rate 18, height 1.829 m (6' 0.01\"), weight 94.8 kg (208 lb 15.9 oz), SpO2 93 %.    Recent Labs      02/21/17   1345  02/22/17   0451  02/23/17   0203   WBC  8.1  14.7*  24.6*   RBC  5.67  6.34*  6.35*   HEMOGLOBIN  16.1  17.7  17.5   HEMATOCRIT  46.2  51.3  52.1*   MCV  81.5  80.9*  82.0   MCH  28.4  27.9  27.6   MCHC  34.8  34.5  33.6*   RDW  38.2  37.9  39.1   PLATELETCT  324  381  414   MPV  9.0  8.7*  9.2     Recent Labs      02/21/17   1345  02/22/17   0451  02/23/17   0203   SODIUM  137  137  136   POTASSIUM  4.1  4.1  3.8   CHLORIDE  108  103  101   CO2  22  25  23   GLUCOSE  95  165*  139*   BUN  17  14  14     Recent Labs      02/22/17   0814   APTT  26.1   INR  0.93         Date 02/23/17 0700 - 02/24/17 0659   Shift 7264-0562 [id number] 1694-4385 24 Hour Total   I  N  T  A  K  E   P.O. 300   300      P.O. 300   300    Shift Total 300   300   O  U  T  P  U  T   Urine          Number of Times Voided 1 x   1 x    Shift Total          300       Intake/Output Summary (Last 24 hours) at 02/23/17 1032  Last data filed at 02/23/17 1000   Gross per 24 hour   Intake   1280 ml   Output   1350 ml   Net    -70 ml         • morphine ER  15 mg     • heparin  5,000 Units     • diphenhydrAMINE  25 mg     • hydromorphone  1 mg     • cyanocobalamin  1,000 mcg     • albuterol  2 Puff     • docusate sodium  100 mg     • senna-docusate  1 Tab     • senna-docusate  1 Tab     • lactulose  30 mL     • bisacodyl  10 mg     • fleet  1 Each     • ondansetron  4 mg     • ondansetron  4 mg     • promethazine  12.5-25 mg     • promethazine  12.5-25 mg     • prochlorperazine  5-10 mg     • acetaminophen  650 mg     • " Pharmacy Consult Request        And   • oxycodone immediate-release  5 mg      And   • oxycodone immediate-release  10 mg     • dexamethasone  4 mg     • omeprazole  20 mg     • mag hydrox-al hydrox-simeth  10 mL           Assessment and Plan:  Left Lumbar radiculopathy  Has disc protrusion left L4-5 w/ caudal disc fragment compressing the L5 root     Pt is scheduled for surgery tomorrow at 3:30pm  Continue steroids  Leukocytosis likely secondary to steroids, will check chest xray, UA is negative  Check TEG, coags are ok  Last dose ibuprofen 2 days ago  NPO at midnight

## 2017-02-23 NOTE — CARE PLAN
Problem: Safety  Goal: Will remain free from injury  Outcome: PROGRESSING AS EXPECTED  Uses call light appropriately    Problem: Mobility  Goal: Risk for activity intolerance will decrease  Outcome: PROGRESSING AS EXPECTED  Calls for help as needed

## 2017-02-24 ENCOUNTER — APPOINTMENT (OUTPATIENT)
Dept: RADIOLOGY | Facility: MEDICAL CENTER | Age: 37
DRG: 520 | End: 2017-02-24
Attending: NEUROLOGICAL SURGERY
Payer: OTHER MISCELLANEOUS

## 2017-02-24 LAB
ALBUMIN SERPL BCP-MCNC: 3.6 G/DL (ref 3.2–4.9)
BUN SERPL-MCNC: 17 MG/DL (ref 8–22)
CALCIUM SERPL-MCNC: 8.9 MG/DL (ref 8.5–10.5)
CHLORIDE SERPL-SCNC: 104 MMOL/L (ref 96–112)
CO2 SERPL-SCNC: 25 MMOL/L (ref 20–33)
CREAT SERPL-MCNC: 0.7 MG/DL (ref 0.5–1.4)
EKG IMPRESSION: NORMAL
ERYTHROCYTE [DISTWIDTH] IN BLOOD BY AUTOMATED COUNT: 39.1 FL (ref 35.9–50)
GFR SERPL CREATININE-BSD FRML MDRD: >60 ML/MIN/1.73 M 2
GLUCOSE SERPL-MCNC: 122 MG/DL (ref 65–99)
HCT VFR BLD AUTO: 48.9 % (ref 42–52)
HGB BLD-MCNC: 16.8 G/DL (ref 14–18)
MCH RBC QN AUTO: 28.1 PG (ref 27–33)
MCHC RBC AUTO-ENTMCNC: 34.4 G/DL (ref 33.7–35.3)
MCV RBC AUTO: 81.9 FL (ref 81.4–97.8)
PHOSPHATE SERPL-MCNC: 3.1 MG/DL (ref 2.5–4.5)
PLATELET # BLD AUTO: 392 K/UL (ref 164–446)
PMV BLD AUTO: 8.7 FL (ref 9–12.9)
POTASSIUM SERPL-SCNC: 3.9 MMOL/L (ref 3.6–5.5)
RBC # BLD AUTO: 5.97 M/UL (ref 4.7–6.1)
SODIUM SERPL-SCNC: 138 MMOL/L (ref 135–145)
WBC # BLD AUTO: 20.2 K/UL (ref 4.8–10.8)

## 2017-02-24 PROCEDURE — A9270 NON-COVERED ITEM OR SERVICE: HCPCS | Performed by: FAMILY MEDICINE

## 2017-02-24 PROCEDURE — 700102 HCHG RX REV CODE 250 W/ 637 OVERRIDE(OP): Performed by: HOSPITALIST

## 2017-02-24 PROCEDURE — 160048 HCHG OR STATISTICAL LEVEL 1-5: Performed by: NEUROLOGICAL SURGERY

## 2017-02-24 PROCEDURE — 700101 HCHG RX REV CODE 250: Performed by: CLINICAL NURSE SPECIALIST

## 2017-02-24 PROCEDURE — 700101 HCHG RX REV CODE 250

## 2017-02-24 PROCEDURE — 500885 HCHG PACK, JACKSON TABLE: Performed by: NEUROLOGICAL SURGERY

## 2017-02-24 PROCEDURE — 36415 COLL VENOUS BLD VENIPUNCTURE: CPT

## 2017-02-24 PROCEDURE — 99232 SBSQ HOSP IP/OBS MODERATE 35: CPT | Performed by: HOSPITALIST

## 2017-02-24 PROCEDURE — 700111 HCHG RX REV CODE 636 W/ 250 OVERRIDE (IP)

## 2017-02-24 PROCEDURE — 700102 HCHG RX REV CODE 250 W/ 637 OVERRIDE(OP): Performed by: NEUROLOGICAL SURGERY

## 2017-02-24 PROCEDURE — 80069 RENAL FUNCTION PANEL: CPT

## 2017-02-24 PROCEDURE — A9270 NON-COVERED ITEM OR SERVICE: HCPCS | Performed by: INTERNAL MEDICINE

## 2017-02-24 PROCEDURE — A4606 OXYGEN PROBE USED W OXIMETER: HCPCS | Performed by: NEUROLOGICAL SURGERY

## 2017-02-24 PROCEDURE — 500364 HCHG DISSECT TOOL, MIDAS: Performed by: NEUROLOGICAL SURGERY

## 2017-02-24 PROCEDURE — 160002 HCHG RECOVERY MINUTES (STAT): Performed by: NEUROLOGICAL SURGERY

## 2017-02-24 PROCEDURE — 85027 COMPLETE CBC AUTOMATED: CPT

## 2017-02-24 PROCEDURE — 502626 HCHG SURGIFLO HEMOSTATIC MATRIX 6ML: Performed by: NEUROLOGICAL SURGERY

## 2017-02-24 PROCEDURE — 72020 X-RAY EXAM OF SPINE 1 VIEW: CPT

## 2017-02-24 PROCEDURE — 160041 HCHG SURGERY MINUTES - EA ADDL 1 MIN LEVEL 4: Performed by: NEUROLOGICAL SURGERY

## 2017-02-24 PROCEDURE — 700112 HCHG RX REV CODE 229: Performed by: HOSPITALIST

## 2017-02-24 PROCEDURE — A9270 NON-COVERED ITEM OR SERVICE: HCPCS | Performed by: HOSPITALIST

## 2017-02-24 PROCEDURE — A9270 NON-COVERED ITEM OR SERVICE: HCPCS | Performed by: NEUROLOGICAL SURGERY

## 2017-02-24 PROCEDURE — 502240 HCHG MISC OR SUPPLY RC 0272: Performed by: NEUROLOGICAL SURGERY

## 2017-02-24 PROCEDURE — 160036 HCHG PACU - EA ADDL 30 MINS PHASE I: Performed by: NEUROLOGICAL SURGERY

## 2017-02-24 PROCEDURE — 0SB20ZZ EXCISION OF LUMBAR VERTEBRAL DISC, OPEN APPROACH: ICD-10-PCS | Performed by: NEUROLOGICAL SURGERY

## 2017-02-24 PROCEDURE — 160029 HCHG SURGERY MINUTES - 1ST 30 MINS LEVEL 4: Performed by: NEUROLOGICAL SURGERY

## 2017-02-24 PROCEDURE — 770006 HCHG ROOM/CARE - MED/SURG/GYN SEMI*

## 2017-02-24 PROCEDURE — A9270 NON-COVERED ITEM OR SERVICE: HCPCS

## 2017-02-24 PROCEDURE — 700111 HCHG RX REV CODE 636 W/ 250 OVERRIDE (IP): Performed by: HOSPITALIST

## 2017-02-24 PROCEDURE — 700102 HCHG RX REV CODE 250 W/ 637 OVERRIDE(OP): Performed by: INTERNAL MEDICINE

## 2017-02-24 PROCEDURE — 700102 HCHG RX REV CODE 250 W/ 637 OVERRIDE(OP)

## 2017-02-24 PROCEDURE — 110382 HCHG SHELL REV 271: Performed by: NEUROLOGICAL SURGERY

## 2017-02-24 PROCEDURE — 700111 HCHG RX REV CODE 636 W/ 250 OVERRIDE (IP): Performed by: NEUROLOGICAL SURGERY

## 2017-02-24 PROCEDURE — 160009 HCHG ANES TIME/MIN: Performed by: NEUROLOGICAL SURGERY

## 2017-02-24 PROCEDURE — 500445 HCHG HEMOSTAT, SURGICEL 4X8: Performed by: NEUROLOGICAL SURGERY

## 2017-02-24 PROCEDURE — 500367 HCHG DRAIN KIT, HEMOVAC: Performed by: NEUROLOGICAL SURGERY

## 2017-02-24 PROCEDURE — 700102 HCHG RX REV CODE 250 W/ 637 OVERRIDE(OP): Performed by: FAMILY MEDICINE

## 2017-02-24 PROCEDURE — 501838 HCHG SUTURE GENERAL: Performed by: NEUROLOGICAL SURGERY

## 2017-02-24 PROCEDURE — A6404 STERILE GAUZE > 48 SQ IN: HCPCS | Performed by: NEUROLOGICAL SURGERY

## 2017-02-24 PROCEDURE — 160035 HCHG PACU - 1ST 60 MINS PHASE I: Performed by: NEUROLOGICAL SURGERY

## 2017-02-24 RX ORDER — BUPIVACAINE HYDROCHLORIDE AND EPINEPHRINE 5; 5 MG/ML; UG/ML
INJECTION, SOLUTION EPIDURAL; INTRACAUDAL; PERINEURAL
Status: DISCONTINUED | OUTPATIENT
Start: 2017-02-24 | End: 2017-02-24 | Stop reason: HOSPADM

## 2017-02-24 RX ORDER — DOCUSATE SODIUM 100 MG/1
100 CAPSULE, LIQUID FILLED ORAL 2 TIMES DAILY
Status: DISCONTINUED | OUTPATIENT
Start: 2017-02-24 | End: 2017-02-26 | Stop reason: HOSPADM

## 2017-02-24 RX ORDER — TIZANIDINE 4 MG/1
2 TABLET ORAL 3 TIMES DAILY PRN
Status: DISCONTINUED | OUTPATIENT
Start: 2017-02-24 | End: 2017-02-26 | Stop reason: HOSPADM

## 2017-02-24 RX ORDER — CHLORAL HYDRATE 500 MG
1000 CAPSULE ORAL DAILY
Status: DISCONTINUED | OUTPATIENT
Start: 2017-02-24 | End: 2017-02-26 | Stop reason: HOSPADM

## 2017-02-24 RX ORDER — BISACODYL 10 MG
10 SUPPOSITORY, RECTAL RECTAL
Status: DISCONTINUED | OUTPATIENT
Start: 2017-02-24 | End: 2017-02-26

## 2017-02-24 RX ORDER — SODIUM CHLORIDE AND POTASSIUM CHLORIDE 150; 900 MG/100ML; MG/100ML
INJECTION, SOLUTION INTRAVENOUS CONTINUOUS
Status: DISCONTINUED | OUTPATIENT
Start: 2017-02-24 | End: 2017-02-26 | Stop reason: HOSPADM

## 2017-02-24 RX ORDER — CEFAZOLIN SODIUM 2 G/100ML
2 INJECTION, SOLUTION INTRAVENOUS EVERY 8 HOURS
Status: COMPLETED | OUTPATIENT
Start: 2017-02-24 | End: 2017-02-25

## 2017-02-24 RX ORDER — OXYCODONE HYDROCHLORIDE 10 MG/1
10 TABLET ORAL
Status: DISCONTINUED | OUTPATIENT
Start: 2017-02-24 | End: 2017-02-26

## 2017-02-24 RX ORDER — CELECOXIB 200 MG/1
CAPSULE ORAL
Status: COMPLETED
Start: 2017-02-24 | End: 2017-02-24

## 2017-02-24 RX ORDER — M-VIT,TX,IRON,MINS/CALC/FOLIC 27MG-0.4MG
1 TABLET ORAL DAILY
Status: DISCONTINUED | OUTPATIENT
Start: 2017-02-24 | End: 2017-02-26 | Stop reason: HOSPADM

## 2017-02-24 RX ORDER — ENEMA 19; 7 G/133ML; G/133ML
1 ENEMA RECTAL
Status: DISCONTINUED | OUTPATIENT
Start: 2017-02-24 | End: 2017-02-26

## 2017-02-24 RX ORDER — ACETAMINOPHEN 500 MG
1000 TABLET ORAL EVERY 6 HOURS
Status: DISCONTINUED | OUTPATIENT
Start: 2017-02-24 | End: 2017-02-26 | Stop reason: HOSPADM

## 2017-02-24 RX ORDER — AMOXICILLIN 250 MG
1 CAPSULE ORAL
Status: DISCONTINUED | OUTPATIENT
Start: 2017-02-24 | End: 2017-02-26

## 2017-02-24 RX ORDER — SODIUM CHLORIDE AND POTASSIUM CHLORIDE 150; 900 MG/100ML; MG/100ML
INJECTION, SOLUTION INTRAVENOUS
Status: COMPLETED
Start: 2017-02-24 | End: 2017-02-24

## 2017-02-24 RX ORDER — AMOXICILLIN 250 MG
1 CAPSULE ORAL NIGHTLY
Status: DISCONTINUED | OUTPATIENT
Start: 2017-02-24 | End: 2017-02-26 | Stop reason: HOSPADM

## 2017-02-24 RX ORDER — ACETAMINOPHEN 500 MG
TABLET ORAL
Status: COMPLETED
Start: 2017-02-24 | End: 2017-02-24

## 2017-02-24 RX ORDER — POLYETHYLENE GLYCOL 3350 17 G/17G
1 POWDER, FOR SOLUTION ORAL 2 TIMES DAILY PRN
Status: DISCONTINUED | OUTPATIENT
Start: 2017-02-24 | End: 2017-02-26 | Stop reason: HOSPADM

## 2017-02-24 RX ADMIN — POTASSIUM CHLORIDE AND SODIUM CHLORIDE: 900; 150 INJECTION, SOLUTION INTRAVENOUS at 11:42

## 2017-02-24 RX ADMIN — DEXAMETHASONE SODIUM PHOSPHATE 4 MG: 4 INJECTION, SOLUTION INTRAMUSCULAR; INTRAVENOUS at 11:42

## 2017-02-24 RX ADMIN — ACETAMINOPHEN 1000 MG: 500 TABLET, FILM COATED ORAL at 20:52

## 2017-02-24 RX ADMIN — HYDROMORPHONE HYDROCHLORIDE 1 MG: 1 INJECTION, SOLUTION INTRAMUSCULAR; INTRAVENOUS; SUBCUTANEOUS at 08:40

## 2017-02-24 RX ADMIN — MORPHINE SULFATE: 50 INJECTION, SOLUTION, CONCENTRATE INTRAVENOUS at 18:00

## 2017-02-24 RX ADMIN — STANDARDIZED SENNA CONCENTRATE AND DOCUSATE SODIUM 1 TABLET: 8.6; 5 TABLET, FILM COATED ORAL at 20:52

## 2017-02-24 RX ADMIN — CELECOXIB 400 MG: 200 CAPSULE ORAL at 15:30

## 2017-02-24 RX ADMIN — MULTIPLE VITAMINS W/ MINERALS TAB 1 TABLET: TAB at 20:49

## 2017-02-24 RX ADMIN — POTASSIUM CHLORIDE AND SODIUM CHLORIDE: 900; 150 INJECTION, SOLUTION INTRAVENOUS at 18:15

## 2017-02-24 RX ADMIN — ACETAMINOPHEN 1000 MG: 500 TABLET, FILM COATED ORAL at 15:30

## 2017-02-24 RX ADMIN — DOCUSATE SODIUM 100 MG: 100 CAPSULE ORAL at 20:51

## 2017-02-24 RX ADMIN — SODIUM CHLORIDE AND POTASSIUM CHLORIDE: 150; 900 INJECTION, SOLUTION INTRAVENOUS at 18:15

## 2017-02-24 RX ADMIN — DIPHENHYDRAMINE HCL 25 MG: 25 TABLET ORAL at 02:57

## 2017-02-24 RX ADMIN — DEXAMETHASONE SODIUM PHOSPHATE 4 MG: 4 INJECTION, SOLUTION INTRAMUSCULAR; INTRAVENOUS at 08:18

## 2017-02-24 RX ADMIN — DOCUSATE SODIUM 100 MG: 100 CAPSULE ORAL at 08:18

## 2017-02-24 RX ADMIN — OMEPRAZOLE 20 MG: 20 CAPSULE, DELAYED RELEASE ORAL at 08:18

## 2017-02-24 RX ADMIN — DEXAMETHASONE SODIUM PHOSPHATE 4 MG: 4 INJECTION, SOLUTION INTRAMUSCULAR; INTRAVENOUS at 00:43

## 2017-02-24 RX ADMIN — CEFAZOLIN SODIUM 2 G: 2 INJECTION, SOLUTION INTRAVENOUS at 20:43

## 2017-02-24 RX ADMIN — CYANOCOBALAMIN TAB 500 MCG 1000 MCG: 500 TAB at 08:18

## 2017-02-24 RX ADMIN — HYDROMORPHONE HYDROCHLORIDE 1 MG: 1 INJECTION, SOLUTION INTRAMUSCULAR; INTRAVENOUS; SUBCUTANEOUS at 11:42

## 2017-02-24 RX ADMIN — MORPHINE SULFATE 15 MG: 15 TABLET, EXTENDED RELEASE ORAL at 20:53

## 2017-02-24 RX ADMIN — OMEGA-3 FATTY ACIDS CAP 1000 MG 1000 MG: 1000 CAP at 20:43

## 2017-02-24 RX ADMIN — MORPHINE SULFATE 15 MG: 15 TABLET, EXTENDED RELEASE ORAL at 08:18

## 2017-02-24 RX ADMIN — HYDROMORPHONE HYDROCHLORIDE 1 MG: 1 INJECTION, SOLUTION INTRAMUSCULAR; INTRAVENOUS; SUBCUTANEOUS at 02:57

## 2017-02-24 RX ADMIN — OXYCODONE HYDROCHLORIDE 10 MG: 10 TABLET ORAL at 00:43

## 2017-02-24 ASSESSMENT — PAIN SCALES - GENERAL
PAINLEVEL_OUTOF10: 0
PAINLEVEL_OUTOF10: 8
PAINLEVEL_OUTOF10: 0
PAINLEVEL_OUTOF10: 7
PAINLEVEL_OUTOF10: 0
PAINLEVEL_OUTOF10: 5
PAINLEVEL_OUTOF10: 0
PAINLEVEL_OUTOF10: 9
PAINLEVEL_OUTOF10: 0
PAINLEVEL_OUTOF10: 6
PAINLEVEL_OUTOF10: 0
PAINLEVEL_OUTOF10: 5

## 2017-02-24 ASSESSMENT — ENCOUNTER SYMPTOMS
COUGH: 0
WEAKNESS: 0
MYALGIAS: 0
DIZZINESS: 0
DIARRHEA: 0
ABDOMINAL PAIN: 0
SHORTNESS OF BREATH: 0
CONSTIPATION: 0
BACK PAIN: 1
VOMITING: 0
NAUSEA: 0
PALPITATIONS: 0
CHILLS: 0
FEVER: 0
FOCAL WEAKNESS: 0
HEADACHES: 0

## 2017-02-24 NOTE — PROGRESS NOTES
AAOx4. Amb 1 assist, walker. Continent of bowel and bladder. C/o frequent Back pain and muscle spasms. Medicated with dilaudid,  Roxicodone, and valium as needed. PIV left FA. Begin IVF at MN : NS + 20 meq KCL @ 75. NPo after MN for surgery scheduled for 1530 tomorrow.

## 2017-02-24 NOTE — PROGRESS NOTES
No s/s of distress at this time. Rounded with day shift nurse. Plan of care discussed with pt. Npo after mn.

## 2017-02-24 NOTE — PROGRESS NOTES
Assumed pt care at 0715.  Received report from sen RN.  Assessment completed.  Pt AAOx4.  Pt complains of back pain.  Medicated per MAR.  No other s/s of discomfort or distress. Pt ambulates with FWW & standby assist.  Bed in lowest position and locked. Surgery scheduled for 1515.  Pt calls appropriately.  Treaded socks in place, call light within reach and staff numbers provided.  Pt needs met at this time.

## 2017-02-24 NOTE — CONSULTS
DATE OF SERVICE:  02/23/2017    CHIEF COMPLAINT:  Severe left lower extremity radiculopathy.    HISTORY OF PRESENT ILLNESS:  The patient is a 36-year-old man well known to   me.  He has undergone I believe a total of 3 operations most recently that   culminated in a redo L3-L4 laminectomy, microdiskectomy and instrumented   fusion.    He represented to my office recently with ongoing complaints of pain radiating   down his leg.    He was recently admitted to the hospital through the ER 2 days ago.  He was   admitted to the internal medicine service with intractable pain radiating down   his left leg.    Over the last 24 hours, he has developed a left foot drop as well.    Despite the fact that he has been on parenteral narcotics and parenteral   steroids, he tells me he is absolutely miserable, he tells me he was up all   night with essentially 10/10 pain.    PHYSICAL EXAMINATION:  He does have a dorsiflexion weakness.  He will wiggle   his toes, but he really has no dorsiflexion strength on the left side.  His   right lower extremity is intact.    IMAGES:  The patient underwent MRI imaging of the lumbar spine.  I have   reviewed the MRI images.  I agree with the dictated comments that he has an   extruded disk fragment coming from the L4-L5 disk space with a caudal fragment   down below the pedicle of L5 on the left.  As evidenced, he has a previous   instrumented fusion and decompression at L3-L4.  There is some confusion in   regarding the nomenclature of the vertebral segments is in my previous   dictations, I believe I called those segments at L4-L5, but he has a   translational vertebra and the radiologist has numbered the fusion at L3-L4   with the extruded fragment coming from L4-L5.  Regardless, the fragment is   coming from the disk space below the instrumented segment.    Because the patient is not progressing at all, in fact he has gotten worse, he   has developed a foot drop since admission, it is my  recommendation that he   undergo surgery.  He is in complete agreement.  He is miserable despite the   fact that he is being medicated.    Planned procedure will be a left L4-L5 lumbar microdiskectomy.  Risks of   surgery include bleeding, infection, worsening neurological condition, bowel   or bladder dysfunction, CSF leak, paralysis and death.    I told him that about 50% of the patients who present with a foot drop caused   by a herniated disk, even after successful operation, do not get restoration   of the weakness in the dorsiflexion.    Other risks of the operation include no improvement in symptoms, 8/10 people   who have this operation reported improvement, whereas 2/10 do not.    He wishes to proceed with early operation.  He is being set up for surgery   tomorrow.    I answered all of his questions and he will be made n.p.o. past midnight, etc.       ____________________________________     MD JONI MUÑOZ / MASOUD    DD:  02/23/2017 15:01:08  DT:  02/23/2017 18:51:17    D#:  [id number]  Job#:  [id number]

## 2017-02-24 NOTE — CARE PLAN
Problem: Safety  Goal: Will remain free from injury  Call light within reach, treaded socks in place, bed in lowest position and locked.  Hourly rounding in progress.     Problem: Pain Management  Goal: Pain level will decrease to patient’s comfort goal  Pt medicated PRN per MAR.

## 2017-02-24 NOTE — PROGRESS NOTES
Hospital Medicine Progress Note, Adult, Complex               Author: Nain Painter Date & Time created: 2/23/2017  4:02 PM     Interval History:  CC: intractable low back pain  2/22: Seen by NSG. Cont to have low back pain. HypoPO4: repleting IV. PPx: added sc hep  2/23: Added MS contin and prn valium. NPO midnight for back surgery tomm. DC heparin after tonight. PO4 now wnl.     Review of Systems:  Review of Systems   Constitutional: Positive for malaise/fatigue. Negative for fever.   Respiratory: Negative for shortness of breath.    Cardiovascular: Negative for chest pain.   Gastrointestinal: Negative for nausea, vomiting, diarrhea and constipation.   Musculoskeletal: Positive for back pain.     Physical Exam:  Physical Exam   Constitutional: He appears well-developed. He appears distressed.   HENT:   Head: Normocephalic.   Eyes: Conjunctivae are normal.   Cardiovascular: Normal rate.    Pulmonary/Chest: No respiratory distress. He has no wheezes.   Abdominal: He exhibits no distension. There is no tenderness.   Musculoskeletal: He exhibits tenderness (back).   Neurological: He is alert. Abnormal muscle tone: no saddle anesth.     Labs:        Invalid input(s): [id number]      Recent Labs      02/21/17   1345  02/22/17   0451  02/23/17   0203   SODIUM  137  137  136   POTASSIUM  4.1  4.1  3.8   CHLORIDE  108  103  101   CO2  22  25  23   BUN  17  14  14   CREATININE  0.87  0.68  0.79   MAGNESIUM   --    --   2.1   PHOSPHORUS   --   1.3*  3.6   CALCIUM  8.8  9.6  9.5     Recent Labs      02/21/17   1345  02/22/17   0451  02/23/17   0203   ALTSGPT  11   --    --    ASTSGOT  14   --    --    ALKPHOSPHAT  53   --    --    TBILIRUBIN  0.4   --    --    GLUCOSE  95  165*  139*     Recent Labs      02/21/17   1345  02/22/17   0451  02/22/17   0814  02/23/17   0203   RBC  5.67  6.34*   --   6.35*   HEMOGLOBIN  16.1  17.7   --   17.5   HEMATOCRIT  46.2  51.3   --   52.1*   PLATELETCT  324  381   --   414   PROTHROMBTM    --    --   12.7   --    APTT   --    --   26.1   --    INR   --    --   0.93   --      Recent Labs      17   1345  17   0451  17   0203   WBC  8.1  14.7*  24.6*   NEUTSPOLYS  51.10  91.80*   --    LYMPHOCYTES  38.20  6.90*   --    MONOCYTES  6.20  0.60   --    EOSINOPHILS  3.30  0.00   --    BASOPHILS  0.70  0.30   --    ASTSGOT  14   --    --    ALTSGPT  11   --    --    ALKPHOSPHAT  53   --    --    TBILIRUBIN  0.4   --    --            Hemodynamics:  Temp (24hrs), Av.7 °C (98.1 °F), Min:36 °C (96.8 °F), Max:37 °C (98.6 °F)  Temperature: 36.9 °C (98.4 °F)  Pulse  Av.7  Min: 61  Max: 105   Blood Pressure: 139/76 mmHg     Respiratory:    Respiration: 18, Pulse Oximetry: 93 %           Fluids:    Intake/Output Summary (Last 24 hours) at 17 1602  Last data filed at 17 1000   Gross per 24 hour   Intake   1040 ml   Output    700 ml   Net    340 ml        GI/Nutrition:  Orders Placed This Encounter   Procedures   • Diet Order     Standing Status: Standing      Number of Occurrences: 1      Standing Expiration Date:      Order Specific Question:  Diet:     Answer:  Regular [1]   • DIET NPO     Standing Status: Standing      Number of Occurrences: 8      Standing Expiration Date:      Order Specific Question:  Restrict to:     Answer:  Sips with Medications [3]     Medical Decision Making, by Problem:  Active Hospital Problems    Diagnosis   • *Intractable low back pain [M54.5]  - Lumbar nerve root impingement seen on MRI  - Neurosurgery Dr. Calderon consulted  - Steroids of dexamethasone for every 6 per neurosurgery's directions  - NPO midnight for back surgery tomm.   • Hypophosphatemia [E83.39]  - repleted   • Left lumbar radiculopathy [M54.16]  - IV morphine and oral oxycodone  - valium PRN  - MS contin 15   • Lumbar nerve root compression [M54.16]     Labs reviewed and Medications reviewed  Alejandro catheter: No Alejandro        DVT prophylaxis - mechanical: SCDs

## 2017-02-24 NOTE — PROGRESS NOTES
"Neurosurgery :     Exam:   Denies new symptoms overnight. States fluctuating pain.  Wiggles toes on left.  Unable to assess otherwise dt pain.     Blood pressure 108/60, pulse 67, temperature 36.4 °C (97.6 °F), resp. rate 18, height 1.829 m (6' 0.01\"), weight 94.8 kg (208 lb 15.9 oz), SpO2 97 %.    Recent Labs      02/22/17 0451  02/23/17   0203  02/24/17   0431   WBC  14.7*  24.6*  20.2*   RBC  6.34*  6.35*  5.97   HEMOGLOBIN  17.7  17.5  16.8   HEMATOCRIT  51.3  52.1*  48.9   MCV  80.9*  82.0  81.9   MCH  27.9  27.6  28.1   MCHC  34.5  33.6*  34.4   RDW  37.9  39.1  39.1   PLATELETCT  381  414  392   MPV  8.7*  9.2  8.7*     Recent Labs      02/22/17 0451 02/23/17   0203  02/24/17   0430   SODIUM  137  136  138   POTASSIUM  4.1  3.8  3.9   CHLORIDE  103  101  104   CO2  25  23  25   GLUCOSE  165*  139*  122*   BUN  14  14  17     Recent Labs      02/22/17   0814   APTT  26.1   INR  0.93            Intake/Output Summary (Last 24 hours) at 02/24/17 1034  Last data filed at 02/24/17 0600   Gross per 24 hour   Intake    500 ml   Output    700 ml   Net   -200 ml         • morphine ER  15 mg     • 0.9 % NaCl with KCl 20 mEq 1,000 mL   Stopped (02/24/17 0000)   • diazepam  5 mg     • diphenhydrAMINE  25 mg     • hydromorphone  1 mg     • cyanocobalamin  1,000 mcg     • albuterol  2 Puff     • docusate sodium  100 mg     • senna-docusate  1 Tab     • senna-docusate  1 Tab     • lactulose  30 mL     • bisacodyl  10 mg     • fleet  1 Each     • ondansetron  4 mg     • ondansetron  4 mg     • promethazine  12.5-25 mg     • promethazine  12.5-25 mg     • prochlorperazine  5-10 mg     • acetaminophen  650 mg     • Pharmacy Consult Request        And   • oxycodone immediate-release  5 mg      And   • oxycodone immediate-release  10 mg     • dexamethasone  4 mg     • omeprazole  20 mg     • mag hydrox-al hydrox-simeth  10 mL           Assessment and Plan:  Left Lumbar radiculopathy  Has disc protrusion left L4-5 w/ caudal " disc fragment compressing the L5 root     Patient has been NPO, Heparin stopped. Discussed TEG and WBC with Dr Calderon  Surgery today

## 2017-02-25 PROBLEM — M54.16 LUMBAR NERVE ROOT COMPRESSION: Status: RESOLVED | Noted: 2017-02-21 | Resolved: 2017-02-25

## 2017-02-25 PROBLEM — E83.39 HYPOPHOSPHATEMIA: Status: RESOLVED | Noted: 2017-02-22 | Resolved: 2017-02-25

## 2017-02-25 PROBLEM — M54.16 LEFT LUMBAR RADICULOPATHY: Status: RESOLVED | Noted: 2017-02-22 | Resolved: 2017-02-25

## 2017-02-25 PROBLEM — M54.59 INTRACTABLE LOW BACK PAIN: Status: RESOLVED | Noted: 2017-02-21 | Resolved: 2017-02-25

## 2017-02-25 LAB
ERYTHROCYTE [DISTWIDTH] IN BLOOD BY AUTOMATED COUNT: 37.2 FL (ref 35.9–50)
HCT VFR BLD AUTO: 51 % (ref 42–52)
HGB BLD-MCNC: 17.9 G/DL (ref 14–18)
MCH RBC QN AUTO: 28.3 PG (ref 27–33)
MCHC RBC AUTO-ENTMCNC: 35.1 G/DL (ref 33.7–35.3)
MCV RBC AUTO: 80.6 FL (ref 81.4–97.8)
PLATELET # BLD AUTO: 404 K/UL (ref 164–446)
PMV BLD AUTO: 8.8 FL (ref 9–12.9)
RBC # BLD AUTO: 6.33 M/UL (ref 4.7–6.1)
WBC # BLD AUTO: 21.8 K/UL (ref 4.8–10.8)

## 2017-02-25 PROCEDURE — 700102 HCHG RX REV CODE 250 W/ 637 OVERRIDE(OP): Performed by: HOSPITALIST

## 2017-02-25 PROCEDURE — 700112 HCHG RX REV CODE 229: Performed by: HOSPITALIST

## 2017-02-25 PROCEDURE — 770006 HCHG ROOM/CARE - MED/SURG/GYN SEMI*

## 2017-02-25 PROCEDURE — 36415 COLL VENOUS BLD VENIPUNCTURE: CPT

## 2017-02-25 PROCEDURE — 700102 HCHG RX REV CODE 250 W/ 637 OVERRIDE(OP): Performed by: INTERNAL MEDICINE

## 2017-02-25 PROCEDURE — A9270 NON-COVERED ITEM OR SERVICE: HCPCS | Performed by: NEUROLOGICAL SURGERY

## 2017-02-25 PROCEDURE — 85027 COMPLETE CBC AUTOMATED: CPT | Mod: 91

## 2017-02-25 PROCEDURE — A9270 NON-COVERED ITEM OR SERVICE: HCPCS | Performed by: INTERNAL MEDICINE

## 2017-02-25 PROCEDURE — A9270 NON-COVERED ITEM OR SERVICE: HCPCS | Performed by: HOSPITALIST

## 2017-02-25 PROCEDURE — 99232 SBSQ HOSP IP/OBS MODERATE 35: CPT | Performed by: HOSPITALIST

## 2017-02-25 PROCEDURE — 700102 HCHG RX REV CODE 250 W/ 637 OVERRIDE(OP): Performed by: NEUROLOGICAL SURGERY

## 2017-02-25 PROCEDURE — 700111 HCHG RX REV CODE 636 W/ 250 OVERRIDE (IP): Performed by: NEUROLOGICAL SURGERY

## 2017-02-25 PROCEDURE — 700111 HCHG RX REV CODE 636 W/ 250 OVERRIDE (IP): Performed by: HOSPITALIST

## 2017-02-25 PROCEDURE — 700112 HCHG RX REV CODE 229: Performed by: NEUROLOGICAL SURGERY

## 2017-02-25 RX ORDER — OXYCODONE HYDROCHLORIDE 10 MG/1
10 TABLET ORAL EVERY 4 HOURS PRN
Qty: 45 TAB | Refills: 0 | Status: ON HOLD | OUTPATIENT
Start: 2017-02-25 | End: 2017-03-10

## 2017-02-25 RX ORDER — TIZANIDINE 2 MG/1
2 TABLET ORAL 3 TIMES DAILY PRN
Qty: 60 TAB | Refills: 2 | Status: SHIPPED | OUTPATIENT
Start: 2017-02-25 | End: 2017-04-07

## 2017-02-25 RX ORDER — PSEUDOEPHEDRINE HCL 30 MG
100 TABLET ORAL 2 TIMES DAILY
Qty: 60 CAP | Refills: 0 | Status: SHIPPED | OUTPATIENT
Start: 2017-02-25 | End: 2017-04-07

## 2017-02-25 RX ADMIN — OXYCODONE HYDROCHLORIDE 10 MG: 10 TABLET ORAL at 14:27

## 2017-02-25 RX ADMIN — DEXAMETHASONE SODIUM PHOSPHATE 4 MG: 4 INJECTION, SOLUTION INTRAMUSCULAR; INTRAVENOUS at 00:05

## 2017-02-25 RX ADMIN — ACETAMINOPHEN 1000 MG: 500 TABLET, FILM COATED ORAL at 23:03

## 2017-02-25 RX ADMIN — MULTIPLE VITAMINS W/ MINERALS TAB 1 TABLET: TAB at 08:50

## 2017-02-25 RX ADMIN — OXYCODONE HYDROCHLORIDE 10 MG: 10 TABLET ORAL at 22:00

## 2017-02-25 RX ADMIN — CEFAZOLIN SODIUM 2 G: 2 INJECTION, SOLUTION INTRAVENOUS at 03:34

## 2017-02-25 RX ADMIN — DEXAMETHASONE SODIUM PHOSPHATE 4 MG: 4 INJECTION, SOLUTION INTRAMUSCULAR; INTRAVENOUS at 05:18

## 2017-02-25 RX ADMIN — DOCUSATE SODIUM 100 MG: 100 CAPSULE ORAL at 05:18

## 2017-02-25 RX ADMIN — HYDROMORPHONE HYDROCHLORIDE 1 MG: 1 INJECTION, SOLUTION INTRAMUSCULAR; INTRAVENOUS; SUBCUTANEOUS at 23:07

## 2017-02-25 RX ADMIN — MORPHINE SULFATE 15 MG: 15 TABLET, EXTENDED RELEASE ORAL at 08:50

## 2017-02-25 RX ADMIN — OMEGA-3 FATTY ACIDS CAP 1000 MG 1000 MG: 1000 CAP at 08:50

## 2017-02-25 RX ADMIN — ACETAMINOPHEN 1000 MG: 500 TABLET, FILM COATED ORAL at 17:31

## 2017-02-25 RX ADMIN — POLYETHYLENE GLYCOL 3350 1 PACKET: 17 POWDER, FOR SOLUTION ORAL at 14:27

## 2017-02-25 RX ADMIN — MORPHINE SULFATE 15 MG: 15 TABLET, EXTENDED RELEASE ORAL at 22:00

## 2017-02-25 RX ADMIN — DOCUSATE SODIUM 100 MG: 100 CAPSULE ORAL at 22:01

## 2017-02-25 RX ADMIN — DEXAMETHASONE SODIUM PHOSPHATE 4 MG: 4 INJECTION, SOLUTION INTRAMUSCULAR; INTRAVENOUS at 17:30

## 2017-02-25 RX ADMIN — OXYCODONE HYDROCHLORIDE 10 MG: 10 TABLET ORAL at 11:13

## 2017-02-25 RX ADMIN — OMEPRAZOLE 20 MG: 20 CAPSULE, DELAYED RELEASE ORAL at 08:50

## 2017-02-25 RX ADMIN — CYANOCOBALAMIN TAB 500 MCG 1000 MCG: 500 TAB at 08:50

## 2017-02-25 RX ADMIN — DIAZEPAM 5 MG: 5 TABLET ORAL at 14:27

## 2017-02-25 RX ADMIN — ACETAMINOPHEN 1000 MG: 500 TABLET, FILM COATED ORAL at 05:18

## 2017-02-25 RX ADMIN — DEXAMETHASONE SODIUM PHOSPHATE 4 MG: 4 INJECTION, SOLUTION INTRAMUSCULAR; INTRAVENOUS at 12:49

## 2017-02-25 RX ADMIN — OXYCODONE HYDROCHLORIDE 10 MG: 10 TABLET ORAL at 22:01

## 2017-02-25 RX ADMIN — ACETAMINOPHEN 1000 MG: 500 TABLET, FILM COATED ORAL at 12:50

## 2017-02-25 RX ADMIN — HYDROMORPHONE HYDROCHLORIDE 1 MG: 1 INJECTION, SOLUTION INTRAMUSCULAR; INTRAVENOUS; SUBCUTANEOUS at 15:26

## 2017-02-25 RX ADMIN — DEXAMETHASONE SODIUM PHOSPHATE 4 MG: 4 INJECTION, SOLUTION INTRAMUSCULAR; INTRAVENOUS at 23:03

## 2017-02-25 RX ADMIN — STANDARDIZED SENNA CONCENTRATE AND DOCUSATE SODIUM 1 TABLET: 8.6; 5 TABLET, FILM COATED ORAL at 22:01

## 2017-02-25 ASSESSMENT — PAIN SCALES - GENERAL
PAINLEVEL_OUTOF10: 9
PAINLEVEL_OUTOF10: 7
PAINLEVEL_OUTOF10: 6
PAINLEVEL_OUTOF10: 7
PAINLEVEL_OUTOF10: 7
PAINLEVEL_OUTOF10: 9
PAINLEVEL_OUTOF10: 9
PAINLEVEL_OUTOF10: 8
PAINLEVEL_OUTOF10: 0

## 2017-02-25 ASSESSMENT — ENCOUNTER SYMPTOMS
COUGH: 0
FEVER: 0
HEADACHES: 0
ABDOMINAL PAIN: 0
DIARRHEA: 0
SHORTNESS OF BREATH: 0
PALPITATIONS: 0
MYALGIAS: 0
FOCAL WEAKNESS: 0
DIZZINESS: 0
BACK PAIN: 1
NAUSEA: 0
CONSTIPATION: 0
CHILLS: 0
VOMITING: 0
WEAKNESS: 0

## 2017-02-25 ASSESSMENT — PATIENT HEALTH QUESTIONNAIRE - PHQ9
2. FEELING DOWN, DEPRESSED, IRRITABLE, OR HOPELESS: NOT AT ALL
SUM OF ALL RESPONSES TO PHQ9 QUESTIONS 1 AND 2: 0
1. LITTLE INTEREST OR PLEASURE IN DOING THINGS: NOT AT ALL
SUM OF ALL RESPONSES TO PHQ QUESTIONS 1-9: 0

## 2017-02-25 NOTE — PROGRESS NOTES
Patient s/p lower back surgery, patient turning independently in bed, patient has gotten out of bed, voiding per urinal.    Patient has a PCA Morphine 0 continuous, 1.5 bolus, lockout 6 min, 4 hour dose 40 mg.   PCA cleared and rate verified with charge nurse Tammy.

## 2017-02-25 NOTE — PROGRESS NOTES
Pt ambulated with walker apx 300-400ft before complaining of pain. Pt returned to room. No complaints of dizziness or feeling light headed. RN notified

## 2017-02-25 NOTE — PROGRESS NOTES
Report received, assumed care of patient. Pt resting quietly in bed with his eyes closed. Call light within reach, personal belongings available, bed in lowest position, treaded socks on. Hourly rounding in place.

## 2017-02-25 NOTE — PROGRESS NOTES
Sonny GEORGE contacted for instructions regarding dressing and shower. Updated on increased pain and weakness to LLE.

## 2017-02-25 NOTE — PROGRESS NOTES
"Hospital Medicine Progress Note, Adult, Complex               Author: Luke Whittaker Date & Time created: 2/24/2017  5:10 PM     37 yo M with intractable back pain    Interval History:  2/24 - discussed with patient; he understands plan for surgery. He has no questions as \"I have done it before.\"     Review of Systems:  Review of Systems   Constitutional: Negative for fever and chills.   Respiratory: Negative for cough and shortness of breath.    Cardiovascular: Negative for chest pain and palpitations.   Gastrointestinal: Negative for nausea, vomiting, abdominal pain, diarrhea and constipation.   Genitourinary: Negative for dysuria.   Musculoskeletal: Positive for back pain. Negative for myalgias.   Skin: Negative for itching.   Neurological: Negative for dizziness, focal weakness, weakness and headaches.   All other systems reviewed and are negative.    Physical Exam:  Physical Exam   Constitutional: He is oriented to person, place, and time. He appears well-developed and well-nourished.   HENT:   Head: Normocephalic and atraumatic.   Mouth/Throat: Oropharynx is clear and moist.   Eyes: Conjunctivae and EOM are normal. Pupils are equal, round, and reactive to light. No scleral icterus.   Neck: Normal range of motion. Neck supple. No tracheal deviation present. No thyromegaly present.   Cardiovascular: Normal rate, regular rhythm, normal heart sounds and intact distal pulses.    No murmur heard.  Pulmonary/Chest: Effort normal and breath sounds normal. No respiratory distress. He has no wheezes.   Abdominal: Soft. Bowel sounds are normal. He exhibits no distension. There is no tenderness.   Musculoskeletal: Normal range of motion. He exhibits tenderness. He exhibits no edema.   Lower back tenderness, strength 5/5 in BLE   Lymphadenopathy:     He has no cervical adenopathy.        Right: No supraclavicular adenopathy present.        Left: No supraclavicular adenopathy present.   Neurological: He is alert and " oriented to person, place, and time. No cranial nerve deficit.   Skin: Skin is warm and dry.   Vitals reviewed.    Labs:        Invalid input(s): [id number]      Recent Labs      17   043   SODIUM  137  136  138   POTASSIUM  4.1  3.8  3.9   CHLORIDE  103  101  104   CO2  25  23  25   BUN  14  14  17   CREATININE  0.68  0.79  0.70   MAGNESIUM   --   2.1   --    PHOSPHORUS  1.3*  3.6  3.1   CALCIUM  9.6  9.5  8.9     Recent Labs      17   GLUCOSE  165*  139*  122*     Recent Labs      17   0814  17   043   RBC  6.34*   --   6.35*  5.97   HEMOGLOBIN  17.7   --   17.5  16.8   HEMATOCRIT  51.3   --   52.1*  48.9   PLATELETCT  381   --   414  392   PROTHROMBTM   --   12.7   --    --    APTT   --   26.1   --    --    INR   --   0.93   --    --      Recent Labs      17   WBC  14.7*  24.6*  20.2*   NEUTSPOLYS  91.80*   --    --    LYMPHOCYTES  6.90*   --    --    MONOCYTES  0.60   --    --    EOSINOPHILS  0.00   --    --    BASOPHILS  0.30   --    --            Hemodynamics:  Temp (24hrs), Av.4 °C (97.5 °F), Min:36.1 °C (97 °F), Max:36.9 °C (98.4 °F)  Temperature: 36.9 °C (98.4 °F)  Pulse  Av  Min: 58  Max: 105   Blood Pressure: 110/65 mmHg     Respiratory:    Respiration: 16, Pulse Oximetry: 98 %           Fluids:    Intake/Output Summary (Last 24 hours) at 17 1710  Last data filed at 17 1400   Gross per 24 hour   Intake    600 ml   Output    700 ml   Net   -100 ml        GI/Nutrition:  Orders Placed This Encounter   Procedures   • DIET NPO     Standing Status: Standing      Number of Occurrences: 8      Standing Expiration Date:      Order Specific Question:  Restrict to:     Answer:  Sips with Medications [3]     Medical Decision Making, by Problem:  Active Hospital Problems    Diagnosis   • *Intractable low back pain  [M54.5]  - Lumbar nerve root impingement seen on MRI  - Neurosurgery Dr. Calderon-->to OR  - Steroids of dexamethasone for every 6 per neurosurgery's directions     • Hypophosphatemia [E83.39]  - repleted     • Left lumbar radiculopathy [M54.16]  - IV morphine and oral oxycodone  - valium PRN  - MS contin 15     • Lumbar nerve root compression [M54.16]     Dispo: PT/OT/pain control, then home    Labs reviewed and Medications reviewed  Alejandro catheter: No Alejandro        DVT prophylaxis - mechanical: SCDs

## 2017-02-25 NOTE — PROGRESS NOTES
Patient disconnected self from Morphine PCA to ambulate floor for 5 min. Patient only receiving bolus morphine from PCA. Patient educated on not disconnecting self from IV and requesting assistance if he needs to be disconnected or ambulated.

## 2017-02-25 NOTE — PROGRESS NOTES
Subjective/Objective;  Pt awake, alert. C/o left leg pain when ambulating - similar to preop.  LE str 5/5 except left TA 3/5, GS 4/5 ? Effort secondary to pain  Inc c/d/i with drsg      Temp (24hrs), Av.1 °C (98.7 °F), Min:36.7 °C (98 °F), Max:37.6 °C (99.6 °F)    Pulse: 87, Heart Rate (Monitored): (!) 57, Respiration: 16, NIBP: 124/91 mmHg, Blood Pressure: 145/88 mmHg, Weight: 45.677 kg (100 lb 11.2 oz), Pulse Oximetry: 91 %, O2 (LPM): 0            Intake/Output Summary (Last 24 hours) at 17 1321  Last data filed at 17 2045   Gross per 24 hour   Intake   1600 ml   Output    450 ml   Net   1150 ml            • fish oil  1,000 mg     • therapeutic multivitamin-minerals  1 Tab     • Pharmacy Consult Request  1 Each     • MD ALERT...Do not administer NSAIDS or ASPIRIN unless ORDERED By Neurosurgery  1 Each     • docusate sodium  100 mg     • senna-docusate  1 Tab     • senna-docusate  1 Tab     • polyethylene glycol/lytes  1 Packet     • magnesium hydroxide  30 mL     • bisacodyl  10 mg     • fleet  1 Each     • 0.9 % NaCl with KCl 20 mEq 1,000 mL   Stopped (17 0145)   • acetaminophen  1,000 mg     • tizanidine  2 mg     • oxycodone immediate release  10 mg     • morphine   Stopped (17 8115)   • morphine ER  15 mg     • diazepam  5 mg     • diphenhydrAMINE  25 mg     • hydromorphone  1 mg     • cyanocobalamin  1,000 mcg     • albuterol  2 Puff     • docusate sodium  100 mg     • senna-docusate  1 Tab     • senna-docusate  1 Tab     • lactulose  30 mL     • bisacodyl  10 mg     • fleet  1 Each     • ondansetron  4 mg     • ondansetron  4 mg     • promethazine  12.5-25 mg     • promethazine  12.5-25 mg     • prochlorperazine  5-10 mg     • acetaminophen  650 mg     • Pharmacy Consult Request        And   • oxycodone immediate-release  5 mg      And   • oxycodone immediate-release  10 mg     • dexamethasone  4 mg     • omeprazole  20 mg     • mag hydrox-al hydrox-simeth  10 mL         Recent  Labs      02/23/17   0203  02/24/17   0431  02/25/17   0237   WBC  24.6*  20.2*  21.8*   RBC  6.35*  5.97  6.33*   HEMOGLOBIN  17.5  16.8  17.9   HEMATOCRIT  52.1*  48.9  51.0   MCV  82.0  81.9  80.6*   MCH  27.6  28.1  28.3   PLATELETCT  414  392  404     Recent Labs      02/23/17   0203  02/24/17   0430   SODIUM  136  138   POTASSIUM  3.8  3.9   CHLORIDE  101  104   CO2  23  25   GLUCOSE  139*  122*   BUN  14  17   CREATININE  0.79  0.70   CALCIUM  9.5  8.9           Assessment:  POD #1 Left 4-5 microdisc    Plan:  Pain control -continue  On Dec IV  Home when pain controlled

## 2017-02-25 NOTE — PROGRESS NOTES
Patient reports 5-6/10 pain which he states it tolerable for him. MS contin administered. Reports slight increase in pain with ambulation but far improved from pre-op pain he was experiencing. Eager to DC. LBM 2/24, voiding without difficulty. Dressing with old drainage intact. Periwoud skin intact without s/s of infection. Ambulating halls with steady gait. Some continues numbness and tingling to LLE.

## 2017-02-25 NOTE — OP REPORT
DATE OF SERVICE:  02/24/2017    PRINCIPAL SURGEON:  Christopher Calderon MD     FIRST SURGICAL ASSISTANT:  Kumar Bowser MD     PREOPERATIVE DIAGNOSIS:  Left lumbar 5 radiculopathy with footdrop.    POSTOPERATIVE DIAGNOSIS:  Left lumbar 5 radiculopathy with footdrop.    PRINCIPAL PROCEDURES:  1.  Left L4-L5 lumbar microdiskectomy.  2.  Intraoperative microscope with microdissection technique.    ANESTHESIA:  The case is done under general anesthesia.    ESTIMATED BLOOD LOSS:  Less than 25 mL.     COMPLICATIONS:  There were no intraoperative complications.    BRIEF HISTORY:  The patient is a 36-year-old man who was admitted through the   emergency department to the internal medicine service 3 days ago.  He   presented with intractable pain radiating down his left leg.  He was started   on parenteral narcotics as well as parenteral steroids including IV Decadron.    On admission, he had good strength in his dorsiflexion and plantarflexion on   the left side over the next 24 hours despite the fact being on Decadron, he   developed profound footdrop.    The patient was offered early operation because of the onset of weakness and   footdrop.  The patient had undergone an MRI scan of the lumbar spine, which   demonstrates a large herniated disk coming from the disk space right below of   the area of his previous instrumented fusion at L3-L4 with a very large free   fragment extruded down to and behind the pedicle of L5.    Risks and benefits of the proposed operation as well as realistic expectations   and outcome of surgery discussed at length with the patient prior to   operation.    OPERATIVE REPORT:  Informed consent was obtained.  Patient was taken to the   operating room where he underwent endotracheal intubation general anesthesia   on the operating room cart.  He was then carefully rolled into the prone   position on the OSI spine table.  Pressure points including his face, elbows,   hands, knees, ankle, and feet were  padded and checked.  The area of the lumbar   spine was shaved, prepped, and draped in usual sterile fashion.  Localizing   x-ray was obtained, which demonstrated the spinal needle pointing directly at   the L4-L5 disk space.    Using a skin marker, I incorporated the previous skin incision from his   previous instrumented fusion and extended it caudally approximately 1 cm.    Approximately 10 mL of 0.5% Marcaine with epinephrine was instilled under the   skin.  Linear skin incision was made with a 10-bladed knife.  The dissection   was carried down through scar tissue.  I could palpate the existing spinous   process of L5.  We developed dissection plane down along the spinous process,   identifying the lamina of L4 and L5.  We placed a deeper self-retaining   retractor in the wound, then brought in the operative microscope.      Using the operative microscope and microdissection technique, I took down   densely adherent scar tissue from the previous lumbar instrumented fusion.  We   then obtained another localizing x-ray and placed the marker right over the   L4-L5 disk space and then made a layo at the level of pedicle of L5.  Then   using the high-speed drill, cut a trough in the lamina from the mid position   below the pedicle of L4 through the mid position of the pedicle of L5.  The   bony work was completed with a #2 and #3 Kerrison punch.  We then worked out   lateral to identify dissection plane along the lateral recess exposing the   lateral margin of the dura.    Again using microdissection technique, I took a right angle nerve hook and   probed underneath the disk space at L4-L5, retrieved 1 small free fragment of   disk material and then using the micro cup forceps retrieved a large free   fragment of disk material as large as the end of the distal portion of my   fifth digit.  Smaller free fragments were retrieved as well.  We irrigated   that space out with normal saline and found no more free  fragments material,   then worked up rostrally towards the disk space itself and I could not palpate   any disk material from the disk itself.  At the conclusion of the   decompression, I could pass a double-ended probe along the course of the   thecal sac above the disk space up towards the pedicle of L4 and then down   along the course of the exiting L5 nerve root as it traversed below the   pedicle of L5 without meeting resistance.    Once I was satisfied we had achieved a complete decompression, the wound was   reirrigated with normal saline, treated with antibiotics.  Hemostasis was   achieved with bipolar electrocautery.  The thoracolumbar fascia was   reapproximated with interrupted 0 Vicryl, superficial fascia was closed with   interrupted 0 Vicryl.  The skin was closed with interrupted 2-0 Vicryl   followed by Steri-Strips and a sterile dressing.  The sponge and needle count   were correct at the end of the case.  There were no intraoperative   complications.       ____________________________________     MD JONI MUÑOZ / MASOUD    DD:  02/24/2017 17:04:40  DT:  02/24/2017 19:52:58    D#:  [id number]  Job#:  [id number]

## 2017-02-25 NOTE — PROGRESS NOTES
"Hospital Medicine Progress Note, Adult, Complex               Author: Luke LALY Whittaker Date & Time created: 2/25/2017  11:53 AM     35 yo M with intractable back pain    Interval History:  2/25 - discussed with patient; his pain is well controlled. PCA off. Tolerating diet well. Had a bowel movement. Ambulating, no significant difficulties. Questions answered. Advised possible discharge if okay with neurosurgery.   2/24 - discussed with patient; he understands plan for surgery. He has no questions as \"I have done it before.\"     Review of Systems:  Review of Systems   Constitutional: Negative for fever and chills.   Respiratory: Negative for cough and shortness of breath.    Cardiovascular: Negative for chest pain and palpitations.   Gastrointestinal: Negative for nausea, vomiting, abdominal pain, diarrhea and constipation.   Genitourinary: Negative for dysuria.   Musculoskeletal: Positive for back pain. Negative for myalgias.   Skin: Negative for itching.   Neurological: Negative for dizziness, focal weakness, weakness and headaches.   All other systems reviewed and are negative.    Physical Exam:  Physical Exam   Constitutional: He is oriented to person, place, and time. He appears well-developed and well-nourished.   HENT:   Head: Normocephalic and atraumatic.   Mouth/Throat: Oropharynx is clear and moist.   Eyes: Conjunctivae and EOM are normal. Pupils are equal, round, and reactive to light. No scleral icterus.   Neck: Normal range of motion. Neck supple. No tracheal deviation present. No thyromegaly present.   Cardiovascular: Normal rate, regular rhythm, normal heart sounds and intact distal pulses.    No murmur heard.  Pulmonary/Chest: Effort normal and breath sounds normal. No respiratory distress. He has no wheezes.   Abdominal: Soft. Bowel sounds are normal. He exhibits no distension. There is no tenderness.   Musculoskeletal: Normal range of motion. He exhibits tenderness. He exhibits no edema.   Lower " back tenderness, strength 5/5 in BLE   Lymphadenopathy:     He has no cervical adenopathy.        Right: No supraclavicular adenopathy present.        Left: No supraclavicular adenopathy present.   Neurological: He is alert and oriented to person, place, and time. No cranial nerve deficit.   Skin: Skin is warm and dry.   Vitals reviewed.    Labs:        Invalid input(s): [id number]      Recent Labs      17   043   SODIUM  136  138   POTASSIUM  3.8  3.9   CHLORIDE  101  104   CO2  23  25   BUN  14  17   CREATININE  0.79  0.70   MAGNESIUM  2.1   --    PHOSPHORUS  3.6  3.1   CALCIUM  9.5  8.9     Recent Labs      17   043   GLUCOSE  139*  122*     Recent Labs      17   0431  17   0237   RBC  6.35*  5.97  6.33*   HEMOGLOBIN  17.5  16.8  17.9   HEMATOCRIT  52.1*  48.9  51.0   PLATELETCT  414  392  404     Recent Labs      17   0431  17   0237   WBC  24.6*  20.2*  21.8*           Hemodynamics:  Temp (24hrs), Av.1 °C (98.7 °F), Min:36.7 °C (98 °F), Max:37.6 °C (99.6 °F)  Temperature: 37.6 °C (99.6 °F)  Pulse  Av.8  Min: 56  Max: 105Heart Rate (Monitored): (!) 57  Blood Pressure: 145/88 mmHg, NIBP: 124/91 mmHg     Respiratory:    Respiration: 16, Pulse Oximetry: 91 %           Fluids:    Intake/Output Summary (Last 24 hours) at 17 1153  Last data filed at 17 2045   Gross per 24 hour   Intake   1600 ml   Output    450 ml   Net   1150 ml     Weight: 45.677 kg (100 lb 11.2 oz)  GI/Nutrition:  Orders Placed This Encounter   Procedures   • DIET ORDER     Standing Status: Standing      Number of Occurrences: 1      Standing Expiration Date:      Order Specific Question:  Diet:     Answer:  Regular [1]     Medical Decision Making, by Problem:  Active Hospital Problems    Diagnosis   • *Intractable low back pain [M54.5]  - Lumbar nerve root impingement seen on MRI  - Neurosurgery Dr. Calderon, s/p  operative management     • Hypophosphatemia [E83.39]  - repleted     • Left lumbar radiculopathy [M54.16]  - oral oxycodone, off PCA  - will stop steroids     • Lumbar nerve root compression [M54.16]     Dispo: home if cleared by NS    Labs reviewed and Medications reviewed  Alejandro catheter: No Alejandro        DVT prophylaxis - mechanical: SCDs          I spent a total of 25 minutes during this clinical encounter of which > 50% was devoted to counseling and coordinating care including review of records, pertinent lab data and studies, as well as discussing diagnostic evaluation and work up, planned therapeutic interventions and future disposition of care. Where indicated, the assessment and plan reflect discussion of patient with consultants, other healthcare providers, family members, and additional research needed to obtain further information in formulating the plan of care of this patient.

## 2017-02-26 VITALS
SYSTOLIC BLOOD PRESSURE: 143 MMHG | DIASTOLIC BLOOD PRESSURE: 92 MMHG | TEMPERATURE: 97 F | RESPIRATION RATE: 20 BRPM | HEIGHT: 72 IN | OXYGEN SATURATION: 95 % | HEART RATE: 94 BPM | BODY MASS INDEX: 13.64 KG/M2 | WEIGHT: 100.7 LBS

## 2017-02-26 LAB
ERYTHROCYTE [DISTWIDTH] IN BLOOD BY AUTOMATED COUNT: 37.3 FL (ref 35.9–50)
HCT VFR BLD AUTO: 50.9 % (ref 42–52)
HGB BLD-MCNC: 17.6 G/DL (ref 14–18)
MCH RBC QN AUTO: 28.1 PG (ref 27–33)
MCHC RBC AUTO-ENTMCNC: 34.6 G/DL (ref 33.7–35.3)
MCV RBC AUTO: 81.3 FL (ref 81.4–97.8)
PLATELET # BLD AUTO: 345 K/UL (ref 164–446)
PMV BLD AUTO: 9.1 FL (ref 9–12.9)
RBC # BLD AUTO: 6.26 M/UL (ref 4.7–6.1)
WBC # BLD AUTO: 17.5 K/UL (ref 4.8–10.8)

## 2017-02-26 PROCEDURE — A9270 NON-COVERED ITEM OR SERVICE: HCPCS | Performed by: NEUROLOGICAL SURGERY

## 2017-02-26 PROCEDURE — 700102 HCHG RX REV CODE 250 W/ 637 OVERRIDE(OP): Performed by: HOSPITALIST

## 2017-02-26 PROCEDURE — 700102 HCHG RX REV CODE 250 W/ 637 OVERRIDE(OP): Performed by: INTERNAL MEDICINE

## 2017-02-26 PROCEDURE — 90471 IMMUNIZATION ADMIN: CPT

## 2017-02-26 PROCEDURE — 99239 HOSP IP/OBS DSCHRG MGMT >30: CPT | Performed by: HOSPITALIST

## 2017-02-26 PROCEDURE — 700112 HCHG RX REV CODE 229: Performed by: HOSPITALIST

## 2017-02-26 PROCEDURE — 700111 HCHG RX REV CODE 636 W/ 250 OVERRIDE (IP): Performed by: HOSPITALIST

## 2017-02-26 PROCEDURE — 700102 HCHG RX REV CODE 250 W/ 637 OVERRIDE(OP): Performed by: NEUROLOGICAL SURGERY

## 2017-02-26 PROCEDURE — 700112 HCHG RX REV CODE 229: Performed by: NEUROLOGICAL SURGERY

## 2017-02-26 PROCEDURE — 90686 IIV4 VACC NO PRSV 0.5 ML IM: CPT | Performed by: HOSPITALIST

## 2017-02-26 PROCEDURE — A9270 NON-COVERED ITEM OR SERVICE: HCPCS | Performed by: HOSPITALIST

## 2017-02-26 PROCEDURE — A9270 NON-COVERED ITEM OR SERVICE: HCPCS | Performed by: INTERNAL MEDICINE

## 2017-02-26 RX ORDER — OXYCODONE HYDROCHLORIDE 10 MG/1
10 TABLET ORAL EVERY 4 HOURS PRN
Status: DISCONTINUED | OUTPATIENT
Start: 2017-02-26 | End: 2017-02-26 | Stop reason: HOSPADM

## 2017-02-26 RX ADMIN — OMEPRAZOLE 20 MG: 20 CAPSULE, DELAYED RELEASE ORAL at 08:41

## 2017-02-26 RX ADMIN — DEXAMETHASONE SODIUM PHOSPHATE 4 MG: 4 INJECTION, SOLUTION INTRAMUSCULAR; INTRAVENOUS at 05:21

## 2017-02-26 RX ADMIN — MORPHINE SULFATE 15 MG: 15 TABLET, EXTENDED RELEASE ORAL at 08:41

## 2017-02-26 RX ADMIN — DIAZEPAM 5 MG: 5 TABLET ORAL at 00:53

## 2017-02-26 RX ADMIN — OXYCODONE HYDROCHLORIDE 10 MG: 10 TABLET ORAL at 05:22

## 2017-02-26 RX ADMIN — INFLUENZA A VIRUS A/CALIFORNIA/7/2009 X-179A (H1N1) ANTIGEN (FORMALDEHYDE INACTIVATED), INFLUENZA A VIRUS A/HONG KONG/4801/2014 X-263B (H3N2) ANTIGEN (FORMALDEHYDE INACTIVATED), INFLUENZA B VIRUS B/PHUKET/3073/2013 ANTIGEN (FORMALDEHYDE INACTIVATED), AND INFLUENZA B VIRUS B/BRISBANE/60/2008 ANTIGEN (FORMALDEHYDE INACTIVATED) 0.5 ML: 15; 15; 15; 15 INJECTION, SUSPENSION INTRAMUSCULAR at 11:53

## 2017-02-26 RX ADMIN — OXYCODONE HYDROCHLORIDE 10 MG: 10 TABLET ORAL at 00:53

## 2017-02-26 RX ADMIN — ACETAMINOPHEN 1000 MG: 500 TABLET, FILM COATED ORAL at 11:53

## 2017-02-26 RX ADMIN — DOCUSATE SODIUM 100 MG: 100 CAPSULE ORAL at 08:41

## 2017-02-26 RX ADMIN — DOCUSATE SODIUM 100 MG: 100 CAPSULE ORAL at 05:21

## 2017-02-26 RX ADMIN — DIAZEPAM 5 MG: 5 TABLET ORAL at 08:41

## 2017-02-26 RX ADMIN — DEXAMETHASONE SODIUM PHOSPHATE 4 MG: 4 INJECTION, SOLUTION INTRAMUSCULAR; INTRAVENOUS at 11:52

## 2017-02-26 RX ADMIN — DOCUSATE SODIUM 100 MG: 100 CAPSULE ORAL at 05:23

## 2017-02-26 RX ADMIN — ACETAMINOPHEN 1000 MG: 500 TABLET, FILM COATED ORAL at 05:22

## 2017-02-26 RX ADMIN — OMEGA-3 FATTY ACIDS CAP 1000 MG 1000 MG: 1000 CAP at 08:41

## 2017-02-26 RX ADMIN — MULTIPLE VITAMINS W/ MINERALS TAB 1 TABLET: TAB at 08:41

## 2017-02-26 RX ADMIN — CYANOCOBALAMIN TAB 500 MCG 1000 MCG: 500 TAB at 08:41

## 2017-02-26 ASSESSMENT — PAIN SCALES - GENERAL
PAINLEVEL_OUTOF10: 5
PAINLEVEL_OUTOF10: 0
PAINLEVEL_OUTOF10: 7
PAINLEVEL_OUTOF10: 0
PAINLEVEL_OUTOF10: 7

## 2017-02-26 ASSESSMENT — LIFESTYLE VARIABLES: EVER_SMOKED: NEVER

## 2017-02-26 NOTE — FACE TO FACE
Face to Face Note  -  Durable Medical Equipment    Luke Whittaker M.D. - NPI: [id number]  I certify that this patient is under my care and that they had a durable medical equipment(DME)face to face encounter by myself that meets the physician DME face-to-face encounter requirements with this patient on:    Date of encounter:   Patient:                    MRN:                       YOB: 2015  Rodriguez Kramer  [id number]  1980     The encounter with the patient was in whole, or in part, for the following medical condition, which is the primary reason for durable medical equipment:  Post-Op Surgery    I certify that, based on my findings, the following durable medical equipment is medically necessary:  Cane.    HOME O2 Saturation Measurements:(Values must be present for Home Oxygen orders)         ,     ,         My Clinical findings support the need for the above equipment due to:  Abnormal Gait    Supporting Symptoms: foot drop post-op L4-5 microdiskectomy

## 2017-02-26 NOTE — CARE PLAN
Problem: Knowledge Deficit  Goal: Knowledge of disease process/condition, treatment plan, diagnostic tests, and medications will improve  Outcome: PROGRESSING AS EXPECTED  POC discussed with patient and family.     Problem: Pain Management  Goal: Pain level will decrease to patient’s comfort goal  Outcome: PROGRESSING AS EXPECTED  Pain increased throughout the day. Patient discharge delayed for tonight. Possible DC tomorrow if pain controlled.

## 2017-02-26 NOTE — PROGRESS NOTES
Pt to DC to home, with cane (delivered by traction). Spoke with DOC Treadwell- pt ok to discharge at this time per Hospitalist and neurosurgery. New prescriptions and follow ups reviewed with patient, all questions answered at this time. IV removed, pt wearing own clothing and all belongings accounted for. Wife at bedside- will be driving patient home in POV.  Flu vaccine given per policy.

## 2017-02-26 NOTE — DISCHARGE INSTRUCTIONS
Discharge Instructions    Discharged to home by car with relative. Discharged via wheelchair, hospital escort: Yes.  Special equipment needed: Cane    Be sure to schedule a follow-up appointment with your primary care doctor or any specialists as instructed.     Discharge Plan:   Diet Plan: Discussed  Activity Level: Discussed  Smoking Cessation Offered: Patient Refused  Confirmed Follow up Appointment: Appointment Scheduled  Confirmed Symptoms Management: Discussed  Medication Reconciliation Updated: Yes  Influenza Vaccine Indication: Indicated: 9 to 64 years of age    I understand that a diet low in cholesterol, fat, and sodium is recommended for good health. Unless I have been given specific instructions below for another diet, I accept this instruction as my diet prescription.   Other diet: As tolerated. A diet low in sodium and saturated fats is best.    Special Instructions: None    · Is patient discharged on Warfarin / Coumadin?   No     · Is patient Post Blood Transfusion?  No    Depression / Suicide Risk    As you are discharged from this [facility] facility, it is important to learn how to keep safe from harming yourself.    Recognize the warning signs:  · Abrupt changes in personality, positive or negative- including increase in energy   · Giving away possessions  · Change in eating patterns- significant weight changes-  positive or negative  · Change in sleeping patterns- unable to sleep or sleeping all the time   · Unwillingness or inability to communicate  · Depression  · Unusual sadness, discouragement and loneliness  · Talk of wanting to die  · Neglect of personal appearance   · Rebelliousness- reckless behavior  · Withdrawal from people/activities they love  · Confusion- inability to concentrate     If you or a loved one observes any of these behaviors or has concerns about self-harm, here's what you can do:  · Talk about it- your feelings and reasons for harming yourself  · Remove any means that  you might use to hurt yourself (examples: pills, rope, extension cords, firearm)  · Get professional help from the community (Mental Health, Substance Abuse, psychological counseling)  · Do not be alone:Call your Safe Contact- someone whom you trust who will be there for you.  · Call your local CRISIS HOTLINE 191-2866 or 778-837-8231  · Call your local Children's Mobile Crisis Response Team [place] (039) 506-7189 or www.[employer]  · Call the toll free National Suicide Prevention Hotlines   · National Suicide Prevention Lifeline 404-836-IPDO (5874)  · National Hope Line Network 800-SUICIDE (103-2432)

## 2017-02-27 NOTE — DISCHARGE SUMMARY
ADMITTING DIAGNOSES:  1.  Intractable back pain with lumbar root impingement.  2.  Anxiety.    DISCHARGE DIAGNOSES:  1.  Lumbar nerve root impingement, now status post left L4-L5 lumbar   microdiskectomy.  2.  [place], improved.    CONSULTATIONS:  Christopher Calderon, neurosurgery.    PROCEDURES:  Left L4-L5 microdiskectomy on 02/24/2017.    DISCHARGE MEDICATIONS:  Colace 100 mg twice daily while taking pain   medications, oxycodone immediate release 10 mg every 4 hours as needed for   severe pain, Zanaflex 2 mg 3 times daily as needed.  He is also to continue   some albuterol HFA 2 puffs every 6 hours as needed for shortness of breath,   vitamin B12, fish oil, gabapentin 300 mg 3 times daily, multivitamins and   vitamin D.  He is to stop his ibuprofen for 2 weeks or as otherwise directed   by neurosurgery.    FOLLOWUP:  Follow up with Dr. Christopher Calderon and Brina Champion in 2 weeks for   primary care followup.    HISTORY OF PRESENT ILLNESS AND HOSPITAL COURSE:  This is a 36-year-old   gentleman who came in with left lumbar nerve impingement, neurosurgery was   consulted.  His pain was well controlled and he was able to be taken into   surgery with left L4-L5 microdiskectomy.  He tolerated the procedure well and   his postoperative pain is well controlled.  He had improvement of his   neurological symptoms and his pain improved such that he no longer needed a walker.    He was arranged for a cane to go home with however, and the patient will be   having his admitting complaints completely resolved.  He was able to discharge   to home in good and stable condition with close outpatient followup.    Total time of discharge is 33 minutes.       ____________________________________     MD DRE Cruz / MASOUD    DD:  02/26/2017 10:23:14  DT:  02/26/2017 19:32:52    D#:  [id number]  Job#:  [id number]

## 2017-03-02 ENCOUNTER — HOSPITAL ENCOUNTER (INPATIENT)
Facility: MEDICAL CENTER | Age: 37
LOS: 8 days | DRG: 856 | End: 2017-03-10
Attending: EMERGENCY MEDICINE | Admitting: INTERNAL MEDICINE
Payer: OTHER MISCELLANEOUS

## 2017-03-02 ENCOUNTER — APPOINTMENT (OUTPATIENT)
Dept: RADIOLOGY | Facility: MEDICAL CENTER | Age: 37
DRG: 856 | End: 2017-03-02
Attending: EMERGENCY MEDICINE
Payer: OTHER MISCELLANEOUS

## 2017-03-02 ENCOUNTER — RESOLUTE PROFESSIONAL BILLING HOSPITAL PROF FEE (OUTPATIENT)
Dept: HOSPITALIST | Facility: MEDICAL CENTER | Age: 37
End: 2017-03-02
Payer: OTHER MISCELLANEOUS

## 2017-03-02 ENCOUNTER — APPOINTMENT (OUTPATIENT)
Dept: RADIOLOGY | Facility: MEDICAL CENTER | Age: 37
DRG: 856 | End: 2017-03-02
Attending: NEUROLOGICAL SURGERY
Payer: OTHER MISCELLANEOUS

## 2017-03-02 DIAGNOSIS — R51.9 ACUTE NONINTRACTABLE HEADACHE, UNSPECIFIED HEADACHE TYPE: ICD-10-CM

## 2017-03-02 DIAGNOSIS — E87.20 LACTIC ACIDOSIS: ICD-10-CM

## 2017-03-02 PROBLEM — L03.90 CELLULITIS: Status: ACTIVE | Noted: 2017-03-02

## 2017-03-02 PROBLEM — A41.9 SEPSIS (HCC): Status: ACTIVE | Noted: 2017-03-02

## 2017-03-02 LAB
ALBUMIN SERPL BCP-MCNC: 3.9 G/DL (ref 3.2–4.9)
ALBUMIN/GLOB SERPL: 1.3 G/DL
ALP SERPL-CCNC: 51 U/L (ref 30–99)
ALT SERPL-CCNC: 18 U/L (ref 2–50)
AMORPH CRY #/AREA URNS HPF: PRESENT /HPF
ANION GAP SERPL CALC-SCNC: 12 MMOL/L (ref 0–11.9)
APPEARANCE UR: ABNORMAL
APTT PPP: 20.1 SEC (ref 24.7–36)
AST SERPL-CCNC: 25 U/L (ref 12–45)
BASOPHILS # BLD AUTO: 0.3 % (ref 0–1.8)
BASOPHILS # BLD: 0.04 K/UL (ref 0–0.12)
BILIRUB SERPL-MCNC: 1.2 MG/DL (ref 0.1–1.5)
BILIRUB UR QL STRIP.AUTO: NEGATIVE
BUN SERPL-MCNC: 15 MG/DL (ref 8–22)
CALCIUM SERPL-MCNC: 9.1 MG/DL (ref 8.5–10.5)
CHLORIDE SERPL-SCNC: 103 MMOL/L (ref 96–112)
CO2 SERPL-SCNC: 24 MMOL/L (ref 20–33)
COLOR UR: YELLOW
COMMENT 1642: NORMAL
CREAT SERPL-MCNC: 0.86 MG/DL (ref 0.5–1.4)
EKG IMPRESSION: NORMAL
EOSINOPHIL # BLD AUTO: 0.53 K/UL (ref 0–0.51)
EOSINOPHIL NFR BLD: 4.5 % (ref 0–6.9)
ERYTHROCYTE [DISTWIDTH] IN BLOOD BY AUTOMATED COUNT: 38.5 FL (ref 35.9–50)
GFR SERPL CREATININE-BSD FRML MDRD: >60 ML/MIN/1.73 M 2
GLOBULIN SER CALC-MCNC: 3 G/DL (ref 1.9–3.5)
GLUCOSE SERPL-MCNC: 109 MG/DL (ref 65–99)
GLUCOSE UR STRIP.AUTO-MCNC: NEGATIVE MG/DL
GRAM STN SPEC: NORMAL
HCT VFR BLD AUTO: 49.6 % (ref 42–52)
HGB BLD-MCNC: 17.1 G/DL (ref 14–18)
IMM GRANULOCYTES # BLD AUTO: 0.16 K/UL (ref 0–0.11)
IMM GRANULOCYTES NFR BLD AUTO: 1.4 % (ref 0–0.9)
INR PPP: 0.93 (ref 0.87–1.13)
KETONES UR STRIP.AUTO-MCNC: NEGATIVE MG/DL
LACTATE BLD-SCNC: 2.4 MMOL/L (ref 0.5–2)
LACTATE BLD-SCNC: 2.9 MMOL/L (ref 0.5–2)
LEUKOCYTE ESTERASE UR QL STRIP.AUTO: NEGATIVE
LYMPHOCYTES # BLD AUTO: 2.56 K/UL (ref 1–4.8)
LYMPHOCYTES NFR BLD: 21.7 % (ref 22–41)
MCH RBC QN AUTO: 28.2 PG (ref 27–33)
MCHC RBC AUTO-ENTMCNC: 34.5 G/DL (ref 33.7–35.3)
MCV RBC AUTO: 81.8 FL (ref 81.4–97.8)
MICRO URNS: ABNORMAL
MONOCYTES # BLD AUTO: 1.23 K/UL (ref 0–0.85)
MONOCYTES NFR BLD AUTO: 10.4 % (ref 0–13.4)
MORPHOLOGY BLD-IMP: NORMAL
NEUTROPHILS # BLD AUTO: 7.3 K/UL (ref 1.82–7.42)
NEUTROPHILS NFR BLD: 61.7 % (ref 44–72)
NITRITE UR QL STRIP.AUTO: NEGATIVE
NRBC # BLD AUTO: 0.03 K/UL
NRBC BLD AUTO-RTO: 0.3 /100 WBC
PH UR STRIP.AUTO: >=9 [PH]
PLATELET # BLD AUTO: 262 K/UL (ref 164–446)
PMV BLD AUTO: 9.3 FL (ref 9–12.9)
POTASSIUM SERPL-SCNC: 4.4 MMOL/L (ref 3.6–5.5)
PROT SERPL-MCNC: 6.9 G/DL (ref 6–8.2)
PROTHROMBIN TIME: 12.8 SEC (ref 12–14.6)
RBC # BLD AUTO: 6.06 M/UL (ref 4.7–6.1)
RBC UR QL AUTO: NEGATIVE
SIGNIFICANT IND 70042: NORMAL
SITE SITE: NORMAL
SODIUM SERPL-SCNC: 139 MMOL/L (ref 135–145)
SOURCE SOURCE: NORMAL
SP GR UR STRIP.AUTO: 1
WBC # BLD AUTO: 11.8 K/UL (ref 4.8–10.8)

## 2017-03-02 PROCEDURE — 96365 THER/PROPH/DIAG IV INF INIT: CPT

## 2017-03-02 PROCEDURE — 700111 HCHG RX REV CODE 636 W/ 250 OVERRIDE (IP): Performed by: EMERGENCY MEDICINE

## 2017-03-02 PROCEDURE — 700111 HCHG RX REV CODE 636 W/ 250 OVERRIDE (IP): Performed by: INTERNAL MEDICINE

## 2017-03-02 PROCEDURE — 71010 DX-CHEST-PORTABLE (1 VIEW): CPT

## 2017-03-02 PROCEDURE — 85025 COMPLETE CBC W/AUTO DIFF WBC: CPT

## 2017-03-02 PROCEDURE — 96366 THER/PROPH/DIAG IV INF ADDON: CPT

## 2017-03-02 PROCEDURE — 96368 THER/DIAG CONCURRENT INF: CPT

## 2017-03-02 PROCEDURE — 87070 CULTURE OTHR SPECIMN AEROBIC: CPT

## 2017-03-02 PROCEDURE — 72158 MRI LUMBAR SPINE W/O & W/DYE: CPT

## 2017-03-02 PROCEDURE — 700105 HCHG RX REV CODE 258

## 2017-03-02 PROCEDURE — 87077 CULTURE AEROBIC IDENTIFY: CPT

## 2017-03-02 PROCEDURE — 99285 EMERGENCY DEPT VISIT HI MDM: CPT

## 2017-03-02 PROCEDURE — 85610 PROTHROMBIN TIME: CPT

## 2017-03-02 PROCEDURE — 700105 HCHG RX REV CODE 258: Performed by: EMERGENCY MEDICINE

## 2017-03-02 PROCEDURE — 93005 ELECTROCARDIOGRAM TRACING: CPT

## 2017-03-02 PROCEDURE — 96375 TX/PRO/DX INJ NEW DRUG ADDON: CPT

## 2017-03-02 PROCEDURE — 700111 HCHG RX REV CODE 636 W/ 250 OVERRIDE (IP)

## 2017-03-02 PROCEDURE — 87205 SMEAR GRAM STAIN: CPT

## 2017-03-02 PROCEDURE — 700117 HCHG RX CONTRAST REV CODE 255: Performed by: NEUROLOGICAL SURGERY

## 2017-03-02 PROCEDURE — 87040 BLOOD CULTURE FOR BACTERIA: CPT

## 2017-03-02 PROCEDURE — 87086 URINE CULTURE/COLONY COUNT: CPT

## 2017-03-02 PROCEDURE — 83605 ASSAY OF LACTIC ACID: CPT | Mod: 91

## 2017-03-02 PROCEDURE — 96376 TX/PRO/DX INJ SAME DRUG ADON: CPT

## 2017-03-02 PROCEDURE — 80053 COMPREHEN METABOLIC PANEL: CPT

## 2017-03-02 PROCEDURE — 700105 HCHG RX REV CODE 258: Performed by: INTERNAL MEDICINE

## 2017-03-02 PROCEDURE — 99223 1ST HOSP IP/OBS HIGH 75: CPT | Performed by: INTERNAL MEDICINE

## 2017-03-02 PROCEDURE — 85730 THROMBOPLASTIN TIME PARTIAL: CPT

## 2017-03-02 PROCEDURE — 770006 HCHG ROOM/CARE - MED/SURG/GYN SEMI*

## 2017-03-02 PROCEDURE — 700112 HCHG RX REV CODE 229: Performed by: INTERNAL MEDICINE

## 2017-03-02 PROCEDURE — 700102 HCHG RX REV CODE 250 W/ 637 OVERRIDE(OP): Performed by: INTERNAL MEDICINE

## 2017-03-02 PROCEDURE — A9270 NON-COVERED ITEM OR SERVICE: HCPCS | Performed by: INTERNAL MEDICINE

## 2017-03-02 PROCEDURE — A9579 GAD-BASE MR CONTRAST NOS,1ML: HCPCS | Performed by: NEUROLOGICAL SURGERY

## 2017-03-02 PROCEDURE — 81001 URINALYSIS AUTO W/SCOPE: CPT

## 2017-03-02 RX ORDER — BISACODYL 10 MG
10 SUPPOSITORY, RECTAL RECTAL
Status: DISCONTINUED | OUTPATIENT
Start: 2017-03-02 | End: 2017-03-03

## 2017-03-02 RX ORDER — SODIUM CHLORIDE 9 MG/ML
1000 INJECTION, SOLUTION INTRAVENOUS ONCE
Status: COMPLETED | OUTPATIENT
Start: 2017-03-02 | End: 2017-03-02

## 2017-03-02 RX ORDER — CEFTRIAXONE 2 G/1
2 INJECTION, POWDER, FOR SOLUTION INTRAMUSCULAR; INTRAVENOUS ONCE
Status: COMPLETED | OUTPATIENT
Start: 2017-03-02 | End: 2017-03-02

## 2017-03-02 RX ORDER — PROMETHAZINE HYDROCHLORIDE 25 MG/1
12.5-25 SUPPOSITORY RECTAL EVERY 4 HOURS PRN
Status: DISCONTINUED | OUTPATIENT
Start: 2017-03-02 | End: 2017-03-03

## 2017-03-02 RX ORDER — SODIUM CHLORIDE 9 MG/ML
500 INJECTION, SOLUTION INTRAVENOUS
Status: DISCONTINUED | OUTPATIENT
Start: 2017-03-02 | End: 2017-03-10 | Stop reason: HOSPADM

## 2017-03-02 RX ORDER — MORPHINE SULFATE 4 MG/ML
4 INJECTION, SOLUTION INTRAMUSCULAR; INTRAVENOUS ONCE
Status: COMPLETED | OUTPATIENT
Start: 2017-03-02 | End: 2017-03-02

## 2017-03-02 RX ORDER — AMOXICILLIN 250 MG
2 CAPSULE ORAL 2 TIMES DAILY
Status: DISCONTINUED | OUTPATIENT
Start: 2017-03-02 | End: 2017-03-03

## 2017-03-02 RX ORDER — ONDANSETRON 2 MG/ML
4 INJECTION INTRAMUSCULAR; INTRAVENOUS EVERY 4 HOURS PRN
Status: DISCONTINUED | OUTPATIENT
Start: 2017-03-02 | End: 2017-03-03

## 2017-03-02 RX ORDER — SODIUM CHLORIDE 9 MG/ML
INJECTION, SOLUTION INTRAVENOUS CONTINUOUS
Status: ACTIVE | OUTPATIENT
Start: 2017-03-02 | End: 2017-03-03

## 2017-03-02 RX ORDER — DOCUSATE SODIUM 100 MG/1
100 CAPSULE, LIQUID FILLED ORAL 2 TIMES DAILY
Status: DISCONTINUED | OUTPATIENT
Start: 2017-03-02 | End: 2017-03-03

## 2017-03-02 RX ORDER — TIZANIDINE 4 MG/1
2 TABLET ORAL 3 TIMES DAILY PRN
Status: DISCONTINUED | OUTPATIENT
Start: 2017-03-02 | End: 2017-03-10 | Stop reason: HOSPADM

## 2017-03-02 RX ORDER — MORPHINE SULFATE 4 MG/ML
3 INJECTION, SOLUTION INTRAMUSCULAR; INTRAVENOUS EVERY 4 HOURS PRN
Status: DISCONTINUED | OUTPATIENT
Start: 2017-03-02 | End: 2017-03-03

## 2017-03-02 RX ORDER — GABAPENTIN 300 MG/1
300 CAPSULE ORAL 3 TIMES DAILY
Status: DISCONTINUED | OUTPATIENT
Start: 2017-03-02 | End: 2017-03-10 | Stop reason: HOSPADM

## 2017-03-02 RX ORDER — PROMETHAZINE HYDROCHLORIDE 25 MG/1
12.5-25 TABLET ORAL EVERY 4 HOURS PRN
Status: DISCONTINUED | OUTPATIENT
Start: 2017-03-02 | End: 2017-03-03

## 2017-03-02 RX ORDER — SODIUM CHLORIDE 9 MG/ML
30 INJECTION, SOLUTION INTRAVENOUS
Status: DISCONTINUED | OUTPATIENT
Start: 2017-03-02 | End: 2017-03-10 | Stop reason: HOSPADM

## 2017-03-02 RX ORDER — ONDANSETRON 4 MG/1
4 TABLET, ORALLY DISINTEGRATING ORAL EVERY 4 HOURS PRN
Status: DISCONTINUED | OUTPATIENT
Start: 2017-03-02 | End: 2017-03-03

## 2017-03-02 RX ORDER — POLYETHYLENE GLYCOL 3350 17 G/17G
1 POWDER, FOR SOLUTION ORAL
Status: DISCONTINUED | OUTPATIENT
Start: 2017-03-02 | End: 2017-03-03

## 2017-03-02 RX ORDER — ACETAMINOPHEN 500 MG
500 TABLET ORAL EVERY 6 HOURS PRN
Status: DISCONTINUED | OUTPATIENT
Start: 2017-03-02 | End: 2017-03-10 | Stop reason: HOSPADM

## 2017-03-02 RX ORDER — ONDANSETRON 2 MG/ML
4 INJECTION INTRAMUSCULAR; INTRAVENOUS ONCE
Status: COMPLETED | OUTPATIENT
Start: 2017-03-02 | End: 2017-03-02

## 2017-03-02 RX ORDER — OMEPRAZOLE 20 MG/1
20 CAPSULE, DELAYED RELEASE ORAL DAILY
COMMUNITY
End: 2017-04-12

## 2017-03-02 RX ORDER — ALBUTEROL SULFATE 90 UG/1
2 AEROSOL, METERED RESPIRATORY (INHALATION) EVERY 6 HOURS PRN
Status: DISCONTINUED | OUTPATIENT
Start: 2017-03-02 | End: 2017-03-10 | Stop reason: HOSPADM

## 2017-03-02 RX ORDER — OXYCODONE HYDROCHLORIDE 10 MG/1
10 TABLET ORAL EVERY 4 HOURS PRN
Status: DISCONTINUED | OUTPATIENT
Start: 2017-03-02 | End: 2017-03-03

## 2017-03-02 RX ADMIN — MORPHINE SULFATE 3 MG: 4 INJECTION INTRAVENOUS at 14:22

## 2017-03-02 RX ADMIN — SODIUM CHLORIDE 1000 ML: 9 INJECTION, SOLUTION INTRAVENOUS at 12:00

## 2017-03-02 RX ADMIN — VANCOMYCIN HYDROCHLORIDE 1600 MG: 10 INJECTION, POWDER, LYOPHILIZED, FOR SOLUTION INTRAVENOUS at 20:42

## 2017-03-02 RX ADMIN — OXYCODONE HYDROCHLORIDE 10 MG: 10 TABLET ORAL at 20:41

## 2017-03-02 RX ADMIN — DOCUSATE SODIUM 100 MG: 100 CAPSULE ORAL at 20:41

## 2017-03-02 RX ADMIN — SODIUM CHLORIDE: 9 INJECTION, SOLUTION INTRAVENOUS at 17:03

## 2017-03-02 RX ADMIN — CEFTRIAXONE 2 G: 2 INJECTION, POWDER, FOR SOLUTION INTRAMUSCULAR; INTRAVENOUS at 23:41

## 2017-03-02 RX ADMIN — GADODIAMIDE 20 ML: 287 INJECTION INTRAVENOUS at 19:01

## 2017-03-02 RX ADMIN — GABAPENTIN 300 MG: 300 CAPSULE ORAL at 23:40

## 2017-03-02 RX ADMIN — MORPHINE SULFATE 4 MG: 4 INJECTION INTRAVENOUS at 12:00

## 2017-03-02 RX ADMIN — VANCOMYCIN HYDROCHLORIDE 2500 MG: 10 INJECTION, POWDER, LYOPHILIZED, FOR SOLUTION INTRAVENOUS at 12:00

## 2017-03-02 RX ADMIN — GABAPENTIN 300 MG: 300 CAPSULE ORAL at 16:41

## 2017-03-02 RX ADMIN — MORPHINE SULFATE 3 MG: 4 INJECTION INTRAVENOUS at 20:37

## 2017-03-02 RX ADMIN — ONDANSETRON 4 MG: 2 INJECTION, SOLUTION INTRAMUSCULAR; INTRAVENOUS at 12:00

## 2017-03-02 RX ADMIN — CEFTRIAXONE 2 G: 2 INJECTION, POWDER, FOR SOLUTION INTRAMUSCULAR; INTRAVENOUS at 12:00

## 2017-03-02 RX ADMIN — OXYCODONE HYDROCHLORIDE 10 MG: 10 TABLET ORAL at 16:41

## 2017-03-02 RX ADMIN — STANDARDIZED SENNA CONCENTRATE AND DOCUSATE SODIUM 2 TABLET: 8.6; 5 TABLET, FILM COATED ORAL at 20:41

## 2017-03-02 RX ADMIN — TIZANIDINE 2 MG: 4 TABLET ORAL at 16:42

## 2017-03-02 ASSESSMENT — PAIN SCALES - GENERAL
PAINLEVEL_OUTOF10: 10
PAINLEVEL_OUTOF10: 8

## 2017-03-02 ASSESSMENT — ENCOUNTER SYMPTOMS
CHILLS: 0
FEVER: 0
VOMITING: 0
HEADACHES: 1
NAUSEA: 1

## 2017-03-02 ASSESSMENT — LIFESTYLE VARIABLES
TOTAL SCORE: 0
CONSUMPTION TOTAL: NEGATIVE
HAVE YOU EVER FELT YOU SHOULD CUT DOWN ON YOUR DRINKING: NO
TOTAL SCORE: 0
EVER_SMOKED: YES
TOTAL SCORE: 0
AVERAGE NUMBER OF DAYS PER WEEK YOU HAVE A DRINK CONTAINING ALCOHOL: 0
HOW MANY TIMES IN THE PAST YEAR HAVE YOU HAD 5 OR MORE DRINKS IN A DAY: 0
EVER FELT BAD OR GUILTY ABOUT YOUR DRINKING: NO
ALCOHOL_USE: YES
DO YOU DRINK ALCOHOL: NO
HAVE PEOPLE ANNOYED YOU BY CRITICIZING YOUR DRINKING: NO
ON A TYPICAL DAY WHEN YOU DRINK ALCOHOL HOW MANY DRINKS DO YOU HAVE: 0
EVER_SMOKED: YES
EVER HAD A DRINK FIRST THING IN THE MORNING TO STEADY YOUR NERVES TO GET RID OF A HANGOVER: NO

## 2017-03-02 ASSESSMENT — PATIENT HEALTH QUESTIONNAIRE - PHQ9
2. FEELING DOWN, DEPRESSED, IRRITABLE, OR HOPELESS: NOT AT ALL
SUM OF ALL RESPONSES TO PHQ QUESTIONS 1-9: 0
SUM OF ALL RESPONSES TO PHQ9 QUESTIONS 1 AND 2: 0
1. LITTLE INTEREST OR PLEASURE IN DOING THINGS: NOT AT ALL

## 2017-03-02 ASSESSMENT — COPD QUESTIONNAIRES
HAVE YOU SMOKED AT LEAST 100 CIGARETTES IN YOUR ENTIRE LIFE: NO/DON'T KNOW
COPD SCREENING SCORE: 1
COPD SCREENING SCORE: 1
DURING THE PAST 4 WEEKS HOW MUCH DID YOU FEEL SHORT OF BREATH: NONE/LITTLE OF THE TIME
HAVE YOU SMOKED AT LEAST 100 CIGARETTES IN YOUR ENTIRE LIFE: NO/DON'T KNOW
DO YOU EVER COUGH UP ANY MUCUS OR PHLEGM?: YES, A FEW DAYS A WEEK OR MONTH
DO YOU EVER COUGH UP ANY MUCUS OR PHLEGM?: YES, A FEW DAYS A WEEK OR MONTH
DURING THE PAST 4 WEEKS HOW MUCH DID YOU FEEL SHORT OF BREATH: NONE/LITTLE OF THE TIME

## 2017-03-02 NOTE — ED PROVIDER NOTES
ED Provider Note    Scribed for Oscar Andrews M.D. by Ronni Elkins. 3/2/2017, 11:14 AM.    Primary care provider: TERRIE Bush  Means of arrival: Wheel Chair  History obtained from: Family member  History limited by: None    CHIEF COMPLAINT  Chief Complaint   Patient presents with   • Migraine   • Post-Op Complications     clear - pus near surgical site       HPI  Rodriguez Kramer is a 36 y.o. male with a history of lumbar and sacral surgeries who presents to the Emergency Department complaining of a headache, which was mild in severity yesterday, but became severe at 0100 this morning. The patient reports associated erythematous face, nausea, neck pain, neck pressure, head pressure, generally hot, and has clear liquid oozing from a surgery wound site on his lower back. Patient denies any fever, chills, shakes, or vomiting. Patient does not report a history of similar symptoms.    REVIEW OF SYSTEMS  Review of Systems   Constitutional: Negative for fever and chills.        Negative for shakes   HENT:        Positive for erythematous face   Gastrointestinal: Positive for nausea. Negative for vomiting.   Musculoskeletal:        Positive for neck pain, neck pressure, head pressure   Neurological: Positive for headaches.        Positive for clear liquid oozing from surgical wound site on lower back   All other systems reviewed and are negative.  C    PAST MEDICAL HISTORY   has a past medical history of ASTHMA and Bulging discs (12/2012).    SURGICAL HISTORY   has past surgical history that includes lumbar laminectomy diskectomy (11/25/08); other neurological surg; lumbar laminectomy diskectomy (8/3/2014); lumbar fusion posterior (6/15/2015); and lumbar laminectomy diskectomy (Left, 2/24/2017).    SOCIAL HISTORY  Social History   Substance Use Topics   • Smoking status: Current Some Day Smoker -- 0.20 packs/day     Types: Cigarettes   • Smokeless tobacco: None   • Alcohol Use: Yes      History    Drug Use No       FAMILY HISTORY  History reviewed. No pertinent family history.    CURRENT MEDICATIONS  No current facility-administered medications on file prior to encounter.     Current Outpatient Prescriptions on File Prior to Encounter   Medication Sig Dispense Refill   • oxycodone immediate release (ROXICODONE) 10 MG immediate release tablet Take 1 Tab by mouth every four hours as needed for Severe Pain. 45 Tab 0   • docusate sodium 100 MG Cap Take 100 mg by mouth 2 Times a Day. While taking pain medications 60 Cap 0   • tizanidine (ZANAFLEX) 2 MG tablet Take 1 Tab by mouth 3 times a day as needed. 60 Tab 2   • gabapentin (NEURONTIN) 300 MG Cap Take 300 mg by mouth 3 times a day.     • therapeutic multivitamin-minerals (THERAGRAN-M) Tab Take 1 Tab by mouth every day.     • albuterol 108 (90 BASE) MCG/ACT Aero Soln inhalation aerosol Inhale 2 Puffs by mouth every 6 hours as needed for Shortness of Breath.     • Omega-3 Fatty Acids (FISH OIL) 1000 MG Cap capsule Take 1,000 mg by mouth every day.     • Cyanocobalamin (B-12 PO) Take 1 Tab by mouth every day.     • Cholecalciferol (VITAMIN D PO) Take 1 Cap by mouth every day.       ALLERGIES  Allergies   Allergen Reactions   • Nkda [No Known Drug Allergy]        PHYSICAL EXAM  VITAL SIGNS: /85 mmHg  Pulse 97  Temp(Src) 37.3 °C (99.1 °F)  Resp 16  Ht 1.829 m (6')  Wt 99.791 kg (220 lb)  BMI 29.83 kg/m2  SpO2 96%    Constitutional:   No acute distress  HENT:  Moist mucous membranes  Eyes:  No conjunctivitis or icterus  Neck:  trachea is midline, no palpable thyroid  Lymphatic:  No cervical lymphadenopathy  Cardiovascular:  Regular rate and rhythm, no murmurs  Thorax & Lungs:  Normal breath sounds, no rhonchi  Abdomen:  Soft, Non-tender  Skin:.  no rash  Back:  Light serosanguinous fluid leaking from the wound site, Non-tender, no CVA tenderness  Extremities:   no edema  Vascular:  symmetric radial pulse  Neurologic: Normal gross motor      LABS  Labs  Reviewed   LACTIC ACID - Abnormal; Notable for the following:     Lactic Acid 2.4 (*)     All other components within normal limits   LACTIC ACID - Abnormal; Notable for the following:     Lactic Acid 2.9 (*)     All other components within normal limits   CBC WITH DIFFERENTIAL - Abnormal; Notable for the following:     WBC 11.8 (*)     Lymphocytes 21.70 (*)     Immature Granulocytes 1.40 (*)     Monos (Absolute) 1.23 (*)     Eos (Absolute) 0.53 (*)     Immature Granulocytes (abs) 0.16 (*)     All other components within normal limits   COMP METABOLIC PANEL - Abnormal; Notable for the following:     Anion Gap 12.0 (*)     Glucose 109 (*)     All other components within normal limits   URINALYSIS - Abnormal; Notable for the following:     Character Hazy (*)     Ph >=9.0 (*)     All other components within normal limits    Narrative:     Indication for culture:->Emergency Room Patient   APTT - Abnormal; Notable for the following:     APTT 20.1 (*)     All other components within normal limits    Narrative:     Indicate which anticoagulants the patient is on:->UNKNOWN   CBC WITH DIFFERENTIAL - Abnormal; Notable for the following:     MCV 81.3 (*)     Monos (Absolute) 1.07 (*)     Eos (Absolute) 0.60 (*)     All other components within normal limits   BASIC METABOLIC PANEL - Abnormal; Notable for the following:     Glucose 125 (*)     All other components within normal limits   APTT - Abnormal; Notable for the following:     APTT 23.5 (*)     All other components within normal limits    Narrative:     If not done within the last 4 hours  Indicate which anticoagulants the patient is on:->NONE   CRP QUANTITIVE (NON-CARDIAC) - Abnormal; Notable for the following:     Stat C-Reactive Protein 2.31 (*)     All other components within normal limits    Narrative:     If not done within the last 4 hours  Indicate which anticoagulants the patient is on:->NONE   PROTHROMBIN TIME - Abnormal; Notable for the following:     INR 0.86  "(*)     All other components within normal limits    Narrative:     If not done within the last 4 hours  Indicate which anticoagulants the patient is on:->NONE   URINE CULTURE(NEW)    Narrative:     Indication for culture:->Emergency Room Patient   BLOOD CULTURE    Narrative:     Per Hospital Policy: Only change Specimen Src: to \"Line\" if  specified by physician order.   BLOOD CULTURE    Narrative:     Per Hospital Policy: Only change Specimen Src: to \"Line\" if  specified by physician order.   PROTHROMBIN TIME    Narrative:     Indicate which anticoagulants the patient is on:->UNKNOWN   ESTIMATED GFR   PERIPHERAL SMEAR REVIEW   DIFFERENTIAL COMMENT   CULTURE WOUND W/ GRAM STAIN    Narrative:     Leaking fluid from Surgical incision   CULTURE RESPIRATORY W/ GRM STN   URINE MICROSCOPIC (W/UA)    Narrative:     Indication for culture:->Emergency Room Patient   GRAM STAIN    Narrative:     Leaking fluid from Surgical incision   MAGNESIUM   ESTIMATED GFR   VANCOMYCIN TROUGH   LACTIC ACID    Narrative:     If not done within the last 4 hours  Indicate which anticoagulants the patient is on:->NONE   WESTERGREN SED RATE    Narrative:     If not done within the last 4 hours  Indicate which anticoagulants the patient is on:->NONE   LACTIC ACID   CSF CELL COUNT   CSF GLUCOSE   CSF PROTEIN   CSF CULTURE   ANAEROBIC CULTURE   GRAM STAIN   LACTIC ACID   LACTIC ACID   CSF CULTURE   LACTIC ACID     All labs reviewed by me.    EKG Interpretation  Interpreted by me  Normal Sinus Rhythm. Rate 88  441 QTc  92 QRS  Left Axis  Impression: ST elevation repolarization patterns  No old EKG for comparison    RADIOLOGY  MR-LUMBAR SPINE-WITH & W/O   Final Result      1.  Interval LEFT L5 hemilaminotomy with resolution of previously described disc extrusion.   2.  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  Abscess is felt unlikely.   3.  No epidural abscess identified.   4.  Central canal remains patent.   5.  RIGHT paracentral posterior disc bulging again present at L5-S1.   6.  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      DX-CHEST-PORTABLE (1 VIEW)   Final Result      No acute cardiopulmonary process is seen.        The radiologist's interpretation of all radiological studies have been reviewed by me.    COURSE & MEDICAL DECISION MAKING  Pertinent Labs & Imaging studies reviewed. (See chart for details)    11:14 AM - Patient seen and examined at bedside. Patient will be treated with Zofran injection 4 mg, Rocephin injection 2 g, morphine 4 mg injection, vancomycin 2500 mg in  mL, and IV infusion 1L. Ordered MR lumbar spine with and without contrast, DX chest, blood culture, U/A, CMP, CBC with differential, lactic acid, peripheral smear review, estimate GFR, PTT, APTT and other labs to evaluate his symptoms. The differential diagnoses include but are not limited to: wound infection, rule out meningitis     11:32 AM Paged Neurology        CRITICAL CARE  The very real possibility of a deterioration of this patient's condition required the highest level of my preparedness for sudden, emergent intervention.  I provided critical care services, which included medication orders, frequent reevaluations of the patient's condition and response to treatment, ordering and reviewing test results, and discussing the case with various consultants.  The critical care time associated with the care of the patient was 35 minutes. Review chart for interventions. This time is exclusive of any other billable procedures.       Medical Decision Making:  Similar headache in dark erythema around the wound with some serosanguineous drainage. Labs are obtained patient was started on IV antibiotics to cover for possible meningitis and soft tissue skin infection. I've contacted  neurosurgery for admission        FINAL IMPRESSION  1. Acute nonintractable headache, unspecified headache type    2. Wound infection after surgery, initial encounter    3. Lactic acidosis     critical care 35 minutes      IRonni (Scribe), am scribing for, and in the presence of, Oscar Andrews M.D..    Electronically signed by: Ronni Elkins (Scribe), 3/2/2017    IOscar M.D. personally performed the services described in this documentation, as scribed by Ronni Elkins in my presence, and it is both accurate and complete.    The note accurately reflects work and decisions made by me.  Oscar Andrews  3/3/2017  7:04 PM

## 2017-03-02 NOTE — ED NOTES
"Pt BIB wife,   Pt screaming in triage, wife reports pt is \"passing out\" due to pain  No hx of migraines  Reports sudden onset of headache at 0100  Pt c/o of pain and surgical site and reports large amount of drainage, site is red and hot per wife  Sepsis score 3  Pt asked to wait in lobby, pt updated on triage process and pt asked to inform RN of any changes.   "

## 2017-03-02 NOTE — LETTER
[facility]ENC[facility] [address] [address]  Phone: Dept: [phone] - Fax:        [facility] Progress Report and Disability Certification  Date of Service: 3/2/2017   No Show:  No  Date / Time of Next Visit:     Claim Information   Patient Name: Rodriguez Kramer  Claim Number:     Employer:   [employer] Date of Injury: 12/3/2012     Insurer / TPA: Misc Workers Comp ID / SSN:    Occupation:  Diagnosis: Diagnoses of Acute nonintractable headache, unspecified headache type, Wound infection after surgery, initial encounter, and Lactic acidosis were pertinent to this visit.    Medical Information   Related to Industrial Injury? Yes ***   Subjective Complaints:  Post op back infection and headache   Objective Findings: Post op back infection and headache   Pre-Existing Condition(s):     Assessment:   Condition Same    Status: Additional Care Required  Comments:patient admitted to neurosurgeon Dr. Calderon for surgery  Permanent Disability:  Comments:per neurosurgeon dr. calderon    Plan: MedicationConsultationTransfer Care    Diagnostics: MRI    Comments:  Disability is per admitting neurosurgeon Dr. Calderon who took the patient to the OR    Disability Information   Status: Temporarily Totally Disabled    From:     Through:   Restrictions are:     Physical Restrictions   Sitting:    Standing:    Stooping:    Bending:      Squatting:    Walking:    Climbing:    Pushing:      Pulling:    Other:    Reaching Above Shoulder (L):   Reaching Above Shoulder (R):       Reaching Below Shoulder (L):    Reaching Below Shoulder (R):      Not to exceed Weight Limits   Carrying(hrs):   Weight Limit(lb):   Lifting(hrs):   Weight  Limit(lb):     Comments: Disability to be determined by admitting neurosurgeon Dr. Calderon who admited the patient for surgery    Repetitive Actions   Hands: i.e. Fine Manipulations from Grasping:     Feet: i.e. Operating Foot Controls:     Driving /  Operate Machinery:     Physician Name: Oscar Andrews Physician Signature: e-SignMATT MICHEL M.D. e-Signature:  , Medical Director   Clinic Name / Location: [address] [address]  [phone]     Clinic Phone Number: Dept: [phone]   Appointment Time:  Visit Start Time:    Check-In Time:  9:54 AM Visit Discharge Time:    Original-Treating Physician or Chiropractor    Page 2-Insurer/TPA    Page 3-Employer    Page 4-Employee

## 2017-03-02 NOTE — IP AVS SNAPSHOT
[employer] Access Code: [email or web address]  Expires: 3/28/2017 12:10 PM    Your email address is not on file at [facility].  Email Addresses are required for you to sign up for [employer], please contact [phone] to verify your personal information and to provide your email address prior to attempting to register for [employer].    Rodriguez Kramer  [address] [place] [address]    [employer]  A secure, online tool to manage your health information     [facility]’s [employer]® is a secure, online tool that connects you to your personalized health information from the privacy of your home -- day or night - making it very easy for you to manage your healthcare. Once the activation process is completed, you can even access your medical information using the [employer] alysia, which is available for free in the Apple Alysia store or Google Play store.     To learn more about [employer], visit [email or web address]    There are two levels of access available (as shown below):   My Chart Features  [facility] Primary Care Doctor [facility]  Specialists [facility]  Urgent  Care Non-[facility] Primary Care Doctor   Email your healthcare team securely and privately 24/7 X X X    Manage appointments: schedule your next appointment; view details of past/upcoming appointments X      Request prescription refills. X      View recent personal medical records, including lab and immunizations X X X X   View health record, including health history, allergies, medications X X X X   Read reports about your outpatient visits, procedures, consult and ER notes X X X X   See your discharge summary, which is a recap of your hospital and/or ER visit that includes your diagnosis, lab results, and care plan X X  X     How to register for [employer]:  Once your e-mail address has been verified, follow the following steps to sign up for [employer].     1. Go to  [email or web address]  2. Click on the Sign Up Now box, which takes you to the New Member Sign Up page. You will need  to provide the following information:  a. Enter your [employer] Access Code exactly as it appears at the top of this page. (You will not need to use this code after you’ve completed the sign-up process. If you do not sign up before the expiration date, you must request a new code.)   b. Enter your date of birth.   c. Enter your home email address.   d. Click Submit, and follow the next screen’s instructions.  3. Create a [employer] ID. This will be your [employer] login ID and cannot be changed, so think of one that is secure and easy to remember.  4. Create a [employer] password. You can change your password at any time.  5. Enter your Password Reset Question and Answer. This can be used at a later time if you forget your password.   6. Enter your e-mail address. This allows you to receive e-mail notifications when new information is available in [employer].  7. Click Sign Up. You can now view your health information.    For assistance activating your [employer] account, call [phone]

## 2017-03-02 NOTE — LETTER
[facility], EMERGENCY DEPT   [address], [place] [address]  Phone: Dept: [phone] - Fax:        [facility] Progress Report and Disability Certification  Date of Service: 3/2/2017   No Show:  No  Date / Time of Next Visit:     Claim Information   Patient Name: Rodriguez Kramer  Claim Number:     Employer: [employer] Date of Injury: 12/3/2012     Insurer / TPA: Misc Workers Comp ID / SSN:    Occupation:  Diagnosis: Diagnoses of Acute nonintractable headache, unspecified headache type and Wound infection after surgery, initial encounter were pertinent to this visit.    Medical Information   Related to Industrial Injury?   ***   Subjective Complaints:      Objective Findings:     Pre-Existing Condition(s):     Assessment:        Status:    Permanent Disability:     Plan:      Diagnostics:      Comments:       Disability Information   Status:      From:     Through:   Restrictions are:     Physical Restrictions   Sitting:    Standing:    Stooping:    Bending:      Squatting:    Walking:    Climbing:    Pushing:      Pulling:    Other:    Reaching Above Shoulder (L):   Reaching Above Shoulder (R):       Reaching Below Shoulder (L):    Reaching Below Shoulder (R):      Not to exceed Weight Limits   Carrying(hrs):   Weight Limit(lb):   Lifting(hrs):   Weight  Limit(lb):     Comments:      Repetitive Actions   Hands: i.e. Fine Manipulations from Grasping:     Feet: i.e. Operating Foot Controls:     Driving / Operate Machinery:     Physician Name: Oscar Andrews Physician Signature:   e-Signature:  , Medical Director   Clinic Name / Location: [facility], EMERGENCY DEPT  [address] [place] [id number] [phone]     Clinic Phone Number: Dept: [phone]   Appointment Time:  Visit Start Time:    Check-In Time:  9:54 AM Visit Discharge Time:    Original-Treating Physician or  Chiropractor    Page 2-Insurer/TPA    Page 3-Employer    Page 4-Employee

## 2017-03-02 NOTE — IP AVS SNAPSHOT
" Home Care Instructions                                                                                                                  Name:Rodriguez Kramer  Medical Record Number:[id number]  CSN: [id number]    YOB: 1980   Age: 36 y.o.  Sex: male  HT:1.829 m (6' 0.01\") WT: 89.5 kg (197 lb 5 oz)          Admit Date: 3/2/2017     Discharge Date:   Today's Date: 3/10/2017  Attending Doctor:  Andrew Montanez M.D.                  Allergies:  Nkda            Discharge Instructions       Discharge Instructions    Discharged to home by car with relative. Discharged via wheelchair, hospital escort: Yes.  Special equipment needed: Not Applicable    Be sure to schedule a follow-up appointment with your primary care doctor or any specialists as instructed.     Discharge Plan:   Influenza Vaccine Indication: Not indicated: Previously immunized this influenza season and > 8 years of age    I understand that a diet low in cholesterol, fat, and sodium is recommended for good health. Unless I have been given specific instructions below for another diet, I accept this instruction as my diet prescription.   Other diet: as tolerated    Special Instructions: None    · Is patient discharged on Warfarin / Coumadin?   No     · Is patient Post Blood Transfusion?  No    Depression / Suicide Risk    As you are discharged from this [facility] facility, it is important to learn how to keep safe from harming yourself.    Recognize the warning signs:  · Abrupt changes in personality, positive or negative- including increase in energy   · Giving away possessions  · Change in eating patterns- significant weight changes-  positive or negative  · Change in sleeping patterns- unable to sleep or sleeping all the time   · Unwillingness or inability to communicate  · Depression  · Unusual sadness, discouragement and loneliness  · Talk of wanting to die  · Neglect of personal appearance   · Rebelliousness- reckless " behavior  · Withdrawal from people/activities they love  · Confusion- inability to concentrate     If you or a loved one observes any of these behaviors or has concerns about self-harm, here's what you can do:  · Talk about it- your feelings and reasons for harming yourself  · Remove any means that you might use to hurt yourself (examples: pills, rope, extension cords, firearm)  · Get professional help from the community (Mental Health, Substance Abuse, psychological counseling)  · Do not be alone:Call your Safe Contact- someone whom you trust who will be there for you.  · Call your local CRISIS HOTLINE 108-0947 or 259-818-1361  · Call your local Children's Mobile Crisis Response Team [place] (223) 877-3830 or www.[employer]  · Call the toll free National Suicide Prevention Hotlines   · National Suicide Prevention Lifeline 497-517-EBJS (4597)  · [facility] Network 800-QWNPKDB (720-2509)        Follow-up Information     1. Follow up with TERRIE Bush. Schedule an appointment as soon as possible for a visit in 2 weeks.    Specialty:  Family Medicine    Why:  Follow-up appointment    Contact information    [address] [place] [id number]  [phone]           Discharge Medication Instructions:    Below are the medications your physician expects you to take upon discharge:    Review all your home medications and newly ordered medications with your doctor and/or pharmacist. Follow medication instructions as directed by your doctor and/or pharmacist.    Please keep your medication list with you and share with your physician.               Medication List      START taking these medications        Instructions    NS SOLN 50 mL with daptomycin 500 MG SOLR 540 mg   Last time this was given:  540 mg on 3/10/2017  5:31 PM    540 mg by Intravenous route every 24 hours.   Dose:  6 mg/kg       ondansetron 4 MG Tbdp   Commonly known as:  ZOFRAN ODT    Take 1 Tab by mouth every four hours as  needed for Nausea/Vomiting (Nausea.??Not appropriate if patient is vomiting.  Give PO if no IV route available.).   Dose:  4 mg       polyethylene glycol/lytes Pack   Commonly known as:  MIRALAX    Take 1 Packet by mouth every day.   Dose:  17 g         CONTINUE taking these medications        Instructions    albuterol 108 (90 BASE) MCG/ACT Aers inhalation aerosol    Inhale 2 Puffs by mouth every 6 hours as needed for Shortness of Breath.   Dose:  2 Puff       B-12 PO    Take 1 Tab by mouth every day.   Dose:  1 Tab        MG Caps   Last time this was given:  100 mg on 3/10/2017  8:57 AM    Take 100 mg by mouth 2 Times a Day. While taking pain medications   Dose:  100 mg       fish oil 1000 MG Caps capsule    Take 1,000 mg by mouth every day.   Dose:  1000 mg       gabapentin 300 MG Caps   Last time this was given:  300 mg on 3/10/2017  1:35 PM   Commonly known as:  NEURONTIN    Take 300 mg by mouth 3 times a day.   Dose:  300 mg       omeprazole 20 MG delayed-release capsule   Commonly known as:  PRILOSEC    Take 20 mg by mouth every day. Indications: Gastroesophageal Reflux Disease   Dose:  20 mg       oxycodone immediate release 10 MG immediate release tablet   Last time this was given:  15 mg on 3/10/2017  6:11 PM   Commonly known as:  ROXICODONE    Take 1 Tab by mouth every four hours as needed for Severe Pain.   Dose:  10 mg       therapeutic multivitamin-minerals Tabs    Take 1 Tab by mouth every day.   Dose:  1 Tab       tizanidine 2 MG tablet   Last time this was given:  2 mg on 3/3/2017  7:55 AM   Commonly known as:  ZANAFLEX    Take 1 Tab by mouth 3 times a day as needed.   Dose:  2 mg       VITAMIN D PO    Take 1 Cap by mouth every day.   Dose:  1 Cap               Instructions           Diet / Nutrition:    Follow any diet instructions given to you by your doctor or the dietician, including how much salt (sodium) you are allowed each day.    If you are overweight, talk to your doctor about a  weight reduction plan.    Activity:    Remain physically active following your doctor's instructions about exercise and activity.    Rest often.     Any time you become even a little tired or short of breath, SIT DOWN and rest.    Worsening Symptoms:    Report any of the following signs and symptoms to the doctor's office immediately:    *Pain of jaw, arm, or neck  *Chest pain not relieved by medication                               *Dizziness or loss of consciousness  *Difficulty breathing even when at rest   *More tired than usual                                       *Bleeding drainage or swelling of surgical site  *Swelling of feet, ankles, legs or stomach                 *Fever (>100ºF)  *Pink or blood tinged sputum  *Weight gain (3lbs/day or 5lbs /week)           *Shock from internal defibrillator (if applicable)  *Palpitations or irregular heartbeats                *Cool and/or numb extremities    Stroke Awareness    Common Risk Factors for Stroke include:    Age  Atrial Fibrillation  Carotid Artery Stenosis  Diabetes Mellitus  Excessive alcohol consumption  High blood pressure  Overweight   Physical inactivity  Smoking    Warning signs and symptoms of a stroke include:    *Sudden numbness or weakness of the face, arm or leg (especially on one side of the body).  *Sudden confusion, trouble speaking or understanding.  *Sudden trouble seeing in one or both eyes.  *Sudden trouble walking, dizziness, loss of balance or coordination.Sudden severe headache with no known cause.    It is very important to get treatment quickly when a stroke occurs. If you experience any of the above warning signs, call 911 immediately.                   Disclaimer         Quit Smoking / Tobacco Use:    I understand the use of any tobacco products increases my chance of suffering from future heart disease or stroke and could cause other illnesses which may shorten my life. Quitting the use of tobacco products is the single most  important thing I can do to improve my health. For further information on smoking / tobacco cessation call a Toll Free Quit Line at 1-699.482.8300 (*National Cancer [place]) or 1-938.469.6075 (American Lung Association) or you can access the web based program at www.lungusa.org.    [place] Tobacco Users Help Line:  (104) 391-6811       Toll Free: 1-679.757.2352  Quit Tobacco Program [facility] (818)698-0383    Crisis Hotline:    [facility] Crisis Hotline:  6-516-RUGHSUX or 1-770.191.6319    [place] Crisis Hotline:    1-586.797.3291 or 150-909-1111    Discharge Survey:   Thank you for choosing [facility]. We hope we did everything we could to make your hospital stay a pleasant one. You may be receiving a phone survey and we would appreciate your time and participation in answering the questions. Your input is very valuable to us in our efforts to improve our service to our patients and their families.        My signature on this form indicates that:    1. I have reviewed and understand the above information.  2. My questions regarding this information have been answered to my satisfaction.  3. I have formulated a plan with my discharge nurse to obtain my prescribed medications for home.                  Disclaimer         __________________________________                     __________       ________                       Patient Signature                                                 Date                    Time

## 2017-03-02 NOTE — LETTER
[facility] [address] [address]  Phone: Dept: [phone] - Fax:        [facility] Progress Report and Disability Certification  Date of Service: 3/2/2017   No Show:  No  Date / Time of Next Visit:     Claim Information   Patient Name: Rodriguez Kramer  Claim Number:  [id number]   Employer:   [employer] Date of Injury: 12/3/2012     Insurer / TPA: Misc Workers Comp ID / SSN:     Occupation:  Diagnosis: Diagnoses of Acute nonintractable headache, unspecified headache type, Wound infection after surgery, initial encounter, and Lactic acidosis were pertinent to this visit.    Medical Information   Related to Industrial Injury?   ***   Subjective Complaints:      Objective Findings:     Pre-Existing Condition(s):     Assessment:        Status:    Permanent Disability:     Plan:      Diagnostics:      Comments:       Disability Information   Status:      From:     Through:   Restrictions are:     Physical Restrictions   Sitting:    Standing:    Stooping:    Bending:      Squatting:    Walking:    Climbing:    Pushing:      Pulling:    Other:    Reaching Above Shoulder (L):   Reaching Above Shoulder (R):       Reaching Below Shoulder (L):    Reaching Below Shoulder (R):      Not to exceed Weight Limits   Carrying(hrs):   Weight Limit(lb):   Lifting(hrs):   Weight  Limit(lb):     Comments:      Repetitive Actions   Hands: i.e. Fine Manipulations from Grasping:     Feet: i.e. Operating Foot Controls:     Driving / Operate Machinery:     Physician Name: Oscar Andrews Physician Signature:   e-Signature:  , Medical Director   Clinic Name / Location: [address] [id number] [phone]     Clinic Phone Number: Dept: [phone]   Appointment Time:  Visit Start Time:    Check-In Time:  9:54 AM Visit Discharge Time:    Original-Treating Physician or Chiropractor    Page 2-Insurer/TPA     Page 3-Employer    Page 4-Employee

## 2017-03-02 NOTE — IP AVS SNAPSHOT
" <p align=\"LEFT\"><IMG SRC=\"//[email or web address]$/[email or web address]\" alt=\"Image\" WIDTH=\"50%\" HEIGHT=\"200\" BORDER=\"\"></p>                   Name:Rodriguez Kramer  Medical Record Number:[id number]  CSN: [id number]    YOB: 1980   Age: 36 y.o.  Sex: male  HT:1.829 m (6' 0.01\") WT: 89.5 kg (197 lb 5 oz)          Admit Date: 3/2/2017     Discharge Date:   Today's Date: 3/10/2017  Attending Doctor:  Andrew Montanez M.D.                  Allergies:  Nkda          Follow-up Information     1. Follow up with TERRIE Bush. Schedule an appointment as soon as possible for a visit in 2 weeks.    Specialty:  Family Medicine    Why:  Follow-up appointment    Contact information    [address] [place] [address]  [phone]           Medication List      Take these Medications        Instructions    albuterol 108 (90 BASE) MCG/ACT Aers inhalation aerosol    Inhale 2 Puffs by mouth every 6 hours as needed for Shortness of Breath.   Dose:  2 Puff       B-12 PO    Take 1 Tab by mouth every day.   Dose:  1 Tab        MG Caps    Take 100 mg by mouth 2 Times a Day. While taking pain medications   Dose:  100 mg       fish oil 1000 MG Caps capsule    Take 1,000 mg by mouth every day.   Dose:  1000 mg       gabapentin 300 MG Caps   Commonly known as:  NEURONTIN    Take 300 mg by mouth 3 times a day.   Dose:  300 mg       NS SOLN 50 mL with daptomycin 500 MG SOLR 540 mg    540 mg by Intravenous route every 24 hours.   Dose:  6 mg/kg       omeprazole 20 MG delayed-release capsule   Commonly known as:  PRILOSEC    Take 20 mg by mouth every day. Indications: Gastroesophageal Reflux Disease   Dose:  20 mg       ondansetron 4 MG Tbdp   Commonly known as:  ZOFRAN ODT    Take 1 Tab by mouth every four hours as needed for Nausea/Vomiting (Nausea.??Not appropriate if patient is vomiting.  Give PO if no IV route available.).   Dose:  4 mg       oxycodone immediate release 10 MG immediate release tablet   Commonly known " as:  ROXICODONE    Take 1 Tab by mouth every four hours as needed for Severe Pain.   Dose:  10 mg       polyethylene glycol/lytes Pack   Commonly known as:  MIRALAX    Take 1 Packet by mouth every day.   Dose:  17 g       therapeutic multivitamin-minerals Tabs    Take 1 Tab by mouth every day.   Dose:  1 Tab       tizanidine 2 MG tablet   Commonly known as:  ZANAFLEX    Take 1 Tab by mouth 3 times a day as needed.   Dose:  2 mg       VITAMIN D PO    Take 1 Cap by mouth every day.   Dose:  1 Cap

## 2017-03-02 NOTE — IP AVS SNAPSHOT
3/10/2017          Rodriguez Kramer  [address] [place] [address]    Dear Rodriguez:    [facility] wants to ensure your discharge home is safe and you or your loved ones have had all your questions answered regarding your care after you leave the hospital.    You may receive a telephone call within two days of your discharge.  This call is to make certain you understand your discharge instructions as well as ensure we provided you with the best care possible during your stay with us.     The call will only last approximately 3-5 minutes and will be done by a nurse.    Once again, we want to ensure your discharge home is safe and that you have a clear understanding of any next steps in your care.  If you have any questions or concerns, please do not hesitate to contact us, we are here for you.  Thank you for choosing [facility] for your healthcare needs.    Sincerely,    Kirt Rizvi    [facility]

## 2017-03-03 PROBLEM — J45.909 ASTHMA: Status: ACTIVE | Noted: 2017-03-03

## 2017-03-03 PROBLEM — D72.829 LEUKOCYTOSIS: Status: ACTIVE | Noted: 2017-03-03

## 2017-03-03 PROBLEM — R51.9 HEADACHE: Status: ACTIVE | Noted: 2017-03-03

## 2017-03-03 LAB
ANION GAP SERPL CALC-SCNC: 6 MMOL/L (ref 0–11.9)
APTT PPP: 23.5 SEC (ref 24.7–36)
BASOPHILS # BLD AUTO: 0.3 % (ref 0–1.8)
BASOPHILS # BLD: 0.03 K/UL (ref 0–0.12)
BUN SERPL-MCNC: 11 MG/DL (ref 8–22)
BURR CELLS/RBC NFR CSF MANUAL: 30 %
CALCIUM SERPL-MCNC: 9 MG/DL (ref 8.5–10.5)
CHLORIDE SERPL-SCNC: 104 MMOL/L (ref 96–112)
CLARITY CSF: ABNORMAL
CO2 SERPL-SCNC: 26 MMOL/L (ref 20–33)
COLOR CSF: ABNORMAL
COLOR SPUN CSF: COLORLESS
CREAT SERPL-MCNC: 0.89 MG/DL (ref 0.5–1.4)
CRP SERPL HS-MCNC: 2.31 MG/DL (ref 0–0.75)
EOSINOPHIL # BLD AUTO: 0.6 K/UL (ref 0–0.51)
EOSINOPHIL NFR BLD: 5.9 % (ref 0–6.9)
ERYTHROCYTE [DISTWIDTH] IN BLOOD BY AUTOMATED COUNT: 38.7 FL (ref 35.9–50)
ERYTHROCYTE [SEDIMENTATION RATE] IN BLOOD BY WESTERGREN METHOD: 10 MM/HOUR (ref 0–15)
GFR SERPL CREATININE-BSD FRML MDRD: >60 ML/MIN/1.73 M 2
GLUCOSE CSF-MCNC: 60 MG/DL (ref 40–80)
GLUCOSE SERPL-MCNC: 125 MG/DL (ref 65–99)
GRAM STN SPEC: NORMAL
HCT VFR BLD AUTO: 46.5 % (ref 42–52)
HGB BLD-MCNC: 16 G/DL (ref 14–18)
IMM GRANULOCYTES # BLD AUTO: 0.09 K/UL (ref 0–0.11)
IMM GRANULOCYTES NFR BLD AUTO: 0.9 % (ref 0–0.9)
INR PPP: 0.86 (ref 0.87–1.13)
LACTATE BLD-SCNC: 1.3 MMOL/L (ref 0.5–2)
LACTATE BLD-SCNC: 1.4 MMOL/L (ref 0.5–2)
LACTATE BLD-SCNC: 3.6 MMOL/L (ref 0.5–2)
LYMPHOCYTES # BLD AUTO: 2.74 K/UL (ref 1–4.8)
LYMPHOCYTES NFR BLD: 27 % (ref 22–41)
LYMPHOCYTES NFR CSF: 14 %
MAGNESIUM SERPL-MCNC: 2.3 MG/DL (ref 1.5–2.5)
MCH RBC QN AUTO: 28 PG (ref 27–33)
MCHC RBC AUTO-ENTMCNC: 34.4 G/DL (ref 33.7–35.3)
MCV RBC AUTO: 81.3 FL (ref 81.4–97.8)
MONOCYTES # BLD AUTO: 1.07 K/UL (ref 0–0.85)
MONOCYTES NFR BLD AUTO: 10.5 % (ref 0–13.4)
MONONUC CELLS NFR CSF: 2 %
NEUTROPHILS # BLD AUTO: 5.63 K/UL (ref 1.82–7.42)
NEUTROPHILS NFR BLD: 55.4 % (ref 44–72)
NEUTROPHILS NFR CSF: 84 %
NRBC # BLD AUTO: 0 K/UL
NRBC BLD AUTO-RTO: 0 /100 WBC
PLATELET # BLD AUTO: 261 K/UL (ref 164–446)
PMV BLD AUTO: 9 FL (ref 9–12.9)
POTASSIUM SERPL-SCNC: 3.7 MMOL/L (ref 3.6–5.5)
PROT CSF-MCNC: 192 MG/DL (ref 15–45)
PROTHROMBIN TIME: 12 SEC (ref 12–14.6)
RBC # BLD AUTO: 5.72 M/UL (ref 4.7–6.1)
RBC # CSF: ABNORMAL CELLS/UL
SIGNIFICANT IND 70042: NORMAL
SITE SITE: NORMAL
SODIUM SERPL-SCNC: 136 MMOL/L (ref 135–145)
SOURCE SOURCE: NORMAL
SPECIMEN VOL CSF: 6 ML
TUBE # CSF: 1
TUBE # CSF: 1
VANCOMYCIN TROUGH SERPL-MCNC: 16.5 UG/ML (ref 10–20)
WBC # BLD AUTO: 10.2 K/UL (ref 4.8–10.8)
WBC # CSF: 248 CELLS/UL (ref 0–10)

## 2017-03-03 PROCEDURE — 110382 HCHG SHELL REV 271: Performed by: NEUROLOGICAL SURGERY

## 2017-03-03 PROCEDURE — 85730 THROMBOPLASTIN TIME PARTIAL: CPT

## 2017-03-03 PROCEDURE — 00QT0ZZ REPAIR SPINAL MENINGES, OPEN APPROACH: ICD-10-PCS | Performed by: NEUROLOGICAL SURGERY

## 2017-03-03 PROCEDURE — 160039 HCHG SURGERY MINUTES - EA ADDL 1 MIN LEVEL 3: Performed by: NEUROLOGICAL SURGERY

## 2017-03-03 PROCEDURE — 700111 HCHG RX REV CODE 636 W/ 250 OVERRIDE (IP): Performed by: INTERNAL MEDICINE

## 2017-03-03 PROCEDURE — 501445 HCHG STAPLER, SKIN DISP: Performed by: NEUROLOGICAL SURGERY

## 2017-03-03 PROCEDURE — 700112 HCHG RX REV CODE 229: Performed by: INTERNAL MEDICINE

## 2017-03-03 PROCEDURE — 700112 HCHG RX REV CODE 229: Performed by: NEUROLOGICAL SURGERY

## 2017-03-03 PROCEDURE — 97161 PT EVAL LOW COMPLEX 20 MIN: CPT

## 2017-03-03 PROCEDURE — 500885 HCHG PACK, JACKSON TABLE: Performed by: NEUROLOGICAL SURGERY

## 2017-03-03 PROCEDURE — 83605 ASSAY OF LACTIC ACID: CPT | Mod: 91

## 2017-03-03 PROCEDURE — 700102 HCHG RX REV CODE 250 W/ 637 OVERRIDE(OP)

## 2017-03-03 PROCEDURE — 99232 SBSQ HOSP IP/OBS MODERATE 35: CPT | Performed by: INTERNAL MEDICINE

## 2017-03-03 PROCEDURE — 36415 COLL VENOUS BLD VENIPUNCTURE: CPT

## 2017-03-03 PROCEDURE — 502240 HCHG MISC OR SUPPLY RC 0272: Performed by: NEUROLOGICAL SURGERY

## 2017-03-03 PROCEDURE — 85610 PROTHROMBIN TIME: CPT

## 2017-03-03 PROCEDURE — 160035 HCHG PACU - 1ST 60 MINS PHASE I: Performed by: NEUROLOGICAL SURGERY

## 2017-03-03 PROCEDURE — 700101 HCHG RX REV CODE 250

## 2017-03-03 PROCEDURE — 85025 COMPLETE CBC W/AUTO DIFF WBC: CPT

## 2017-03-03 PROCEDURE — 87075 CULTR BACTERIA EXCEPT BLOOD: CPT

## 2017-03-03 PROCEDURE — 700105 HCHG RX REV CODE 258

## 2017-03-03 PROCEDURE — 85652 RBC SED RATE AUTOMATED: CPT

## 2017-03-03 PROCEDURE — 160009 HCHG ANES TIME/MIN: Performed by: NEUROLOGICAL SURGERY

## 2017-03-03 PROCEDURE — 83735 ASSAY OF MAGNESIUM: CPT

## 2017-03-03 PROCEDURE — 700102 HCHG RX REV CODE 250 W/ 637 OVERRIDE(OP): Performed by: INTERNAL MEDICINE

## 2017-03-03 PROCEDURE — 160002 HCHG RECOVERY MINUTES (STAT): Performed by: NEUROLOGICAL SURGERY

## 2017-03-03 PROCEDURE — 700105 HCHG RX REV CODE 258: Performed by: INTERNAL MEDICINE

## 2017-03-03 PROCEDURE — 84157 ASSAY OF PROTEIN OTHER: CPT

## 2017-03-03 PROCEDURE — 110371 HCHG SHELL REV 272: Performed by: NEUROLOGICAL SURGERY

## 2017-03-03 PROCEDURE — 87205 SMEAR GRAM STAIN: CPT

## 2017-03-03 PROCEDURE — 501838 HCHG SUTURE GENERAL: Performed by: NEUROLOGICAL SURGERY

## 2017-03-03 PROCEDURE — 700111 HCHG RX REV CODE 636 W/ 250 OVERRIDE (IP)

## 2017-03-03 PROCEDURE — 160028 HCHG SURGERY MINUTES - 1ST 30 MINS LEVEL 3: Performed by: NEUROLOGICAL SURGERY

## 2017-03-03 PROCEDURE — 89051 BODY FLUID CELL COUNT: CPT

## 2017-03-03 PROCEDURE — 86140 C-REACTIVE PROTEIN: CPT

## 2017-03-03 PROCEDURE — 80202 ASSAY OF VANCOMYCIN: CPT

## 2017-03-03 PROCEDURE — A6404 STERILE GAUZE > 48 SQ IN: HCPCS | Performed by: NEUROLOGICAL SURGERY

## 2017-03-03 PROCEDURE — A9270 NON-COVERED ITEM OR SERVICE: HCPCS | Performed by: INTERNAL MEDICINE

## 2017-03-03 PROCEDURE — 82945 GLUCOSE OTHER FLUID: CPT

## 2017-03-03 PROCEDURE — A9270 NON-COVERED ITEM OR SERVICE: HCPCS

## 2017-03-03 PROCEDURE — 770006 HCHG ROOM/CARE - MED/SURG/GYN SEMI*

## 2017-03-03 PROCEDURE — 80048 BASIC METABOLIC PNL TOTAL CA: CPT

## 2017-03-03 PROCEDURE — 700102 HCHG RX REV CODE 250 W/ 637 OVERRIDE(OP): Performed by: NEUROLOGICAL SURGERY

## 2017-03-03 PROCEDURE — G8978 MOBILITY CURRENT STATUS: HCPCS | Mod: CI

## 2017-03-03 PROCEDURE — A4314 CATH W/DRAINAGE 2-WAY LATEX: HCPCS | Performed by: NEUROLOGICAL SURGERY

## 2017-03-03 PROCEDURE — 501899 HCHG DURASEAL SEALANT SYSTEM 5ML: Performed by: NEUROLOGICAL SURGERY

## 2017-03-03 PROCEDURE — 160036 HCHG PACU - EA ADDL 30 MINS PHASE I: Performed by: NEUROLOGICAL SURGERY

## 2017-03-03 PROCEDURE — A6402 STERILE GAUZE <= 16 SQ IN: HCPCS | Performed by: NEUROLOGICAL SURGERY

## 2017-03-03 PROCEDURE — 87070 CULTURE OTHR SPECIMN AEROBIC: CPT

## 2017-03-03 PROCEDURE — 502626 HCHG SURGIFLO HEMOSTATIC MATRIX 6ML: Performed by: NEUROLOGICAL SURGERY

## 2017-03-03 PROCEDURE — 160048 HCHG OR STATISTICAL LEVEL 1-5: Performed by: NEUROLOGICAL SURGERY

## 2017-03-03 PROCEDURE — G8979 MOBILITY GOAL STATUS: HCPCS | Mod: CI

## 2017-03-03 PROCEDURE — A9270 NON-COVERED ITEM OR SERVICE: HCPCS | Performed by: NEUROLOGICAL SURGERY

## 2017-03-03 DEVICE — DURASEAL SEALANT SYSTEM 5ML - (5/BX): Type: IMPLANTABLE DEVICE | Status: FUNCTIONAL

## 2017-03-03 RX ORDER — ENEMA 19; 7 G/133ML; G/133ML
1 ENEMA RECTAL
Status: DISCONTINUED | OUTPATIENT
Start: 2017-03-03 | End: 2017-03-10 | Stop reason: HOSPADM

## 2017-03-03 RX ORDER — OXYCODONE HYDROCHLORIDE 5 MG/1
15 TABLET ORAL EVERY 4 HOURS PRN
Status: DISCONTINUED | OUTPATIENT
Start: 2017-03-03 | End: 2017-03-03

## 2017-03-03 RX ORDER — MIDAZOLAM HYDROCHLORIDE 1 MG/ML
INJECTION INTRAMUSCULAR; INTRAVENOUS
Status: COMPLETED
Start: 2017-03-03 | End: 2017-03-03

## 2017-03-03 RX ORDER — OXYCODONE HYDROCHLORIDE 5 MG/1
15 TABLET ORAL EVERY 4 HOURS PRN
Status: DISCONTINUED | OUTPATIENT
Start: 2017-03-03 | End: 2017-03-10 | Stop reason: HOSPADM

## 2017-03-03 RX ORDER — BUPIVACAINE HYDROCHLORIDE AND EPINEPHRINE 5; 5 MG/ML; UG/ML
INJECTION, SOLUTION EPIDURAL; INTRACAUDAL; PERINEURAL
Status: DISCONTINUED | OUTPATIENT
Start: 2017-03-03 | End: 2017-03-03 | Stop reason: HOSPADM

## 2017-03-03 RX ORDER — PROMETHAZINE HYDROCHLORIDE 25 MG/1
12.5-25 SUPPOSITORY RECTAL EVERY 4 HOURS PRN
Status: DISCONTINUED | OUTPATIENT
Start: 2017-03-03 | End: 2017-03-10 | Stop reason: HOSPADM

## 2017-03-03 RX ORDER — MORPHINE SULFATE 4 MG/ML
3 INJECTION, SOLUTION INTRAMUSCULAR; INTRAVENOUS EVERY 4 HOURS PRN
Status: DISCONTINUED | OUTPATIENT
Start: 2017-03-03 | End: 2017-03-10 | Stop reason: HOSPADM

## 2017-03-03 RX ORDER — OXYCODONE HYDROCHLORIDE 10 MG/1
10 TABLET ORAL EVERY 4 HOURS PRN
Status: DISCONTINUED | OUTPATIENT
Start: 2017-03-03 | End: 2017-03-10 | Stop reason: HOSPADM

## 2017-03-03 RX ORDER — AMOXICILLIN 250 MG
1 CAPSULE ORAL NIGHTLY
Status: DISCONTINUED | OUTPATIENT
Start: 2017-03-03 | End: 2017-03-10 | Stop reason: HOSPADM

## 2017-03-03 RX ORDER — OXYCODONE HYDROCHLORIDE 10 MG/1
10-15 TABLET ORAL EVERY 4 HOURS PRN
Status: DISCONTINUED | OUTPATIENT
Start: 2017-03-03 | End: 2017-03-03

## 2017-03-03 RX ORDER — OXYCODONE HCL 5 MG/5 ML
SOLUTION, ORAL ORAL
Status: COMPLETED
Start: 2017-03-03 | End: 2017-03-03

## 2017-03-03 RX ORDER — AMOXICILLIN 250 MG
1 CAPSULE ORAL
Status: DISCONTINUED | OUTPATIENT
Start: 2017-03-03 | End: 2017-03-10 | Stop reason: HOSPADM

## 2017-03-03 RX ORDER — SODIUM CHLORIDE 9 MG/ML
INJECTION, SOLUTION INTRAVENOUS
Status: COMPLETED
Start: 2017-03-03 | End: 2017-03-03

## 2017-03-03 RX ORDER — DIPHENHYDRAMINE HYDROCHLORIDE 50 MG/ML
25 INJECTION INTRAMUSCULAR; INTRAVENOUS EVERY 6 HOURS PRN
Status: DISCONTINUED | OUTPATIENT
Start: 2017-03-03 | End: 2017-03-10 | Stop reason: HOSPADM

## 2017-03-03 RX ORDER — PROMETHAZINE HYDROCHLORIDE 25 MG/1
12.5-25 TABLET ORAL EVERY 4 HOURS PRN
Status: DISCONTINUED | OUTPATIENT
Start: 2017-03-03 | End: 2017-03-10 | Stop reason: HOSPADM

## 2017-03-03 RX ORDER — DIPHENHYDRAMINE HCL 25 MG
25 TABLET ORAL EVERY 6 HOURS PRN
Status: DISCONTINUED | OUTPATIENT
Start: 2017-03-03 | End: 2017-03-10 | Stop reason: HOSPADM

## 2017-03-03 RX ORDER — BISACODYL 10 MG
10 SUPPOSITORY, RECTAL RECTAL
Status: DISCONTINUED | OUTPATIENT
Start: 2017-03-03 | End: 2017-03-10 | Stop reason: HOSPADM

## 2017-03-03 RX ORDER — ONDANSETRON 4 MG/1
4 TABLET, ORALLY DISINTEGRATING ORAL EVERY 4 HOURS PRN
Status: DISCONTINUED | OUTPATIENT
Start: 2017-03-03 | End: 2017-03-10 | Stop reason: HOSPADM

## 2017-03-03 RX ORDER — DIAZEPAM 5 MG/1
5 TABLET ORAL EVERY 6 HOURS PRN
Status: DISCONTINUED | OUTPATIENT
Start: 2017-03-03 | End: 2017-03-10 | Stop reason: HOSPADM

## 2017-03-03 RX ORDER — POLYETHYLENE GLYCOL 3350 17 G/17G
1 POWDER, FOR SOLUTION ORAL 2 TIMES DAILY PRN
Status: DISCONTINUED | OUTPATIENT
Start: 2017-03-03 | End: 2017-03-10 | Stop reason: HOSPADM

## 2017-03-03 RX ORDER — ACETAMINOPHEN 500 MG
1000 TABLET ORAL EVERY 6 HOURS
Status: DISPENSED | OUTPATIENT
Start: 2017-03-03 | End: 2017-03-08

## 2017-03-03 RX ORDER — HYDROMORPHONE HYDROCHLORIDE 2 MG/ML
INJECTION, SOLUTION INTRAMUSCULAR; INTRAVENOUS; SUBCUTANEOUS
Status: COMPLETED
Start: 2017-03-03 | End: 2017-03-03

## 2017-03-03 RX ORDER — BACITRACIN ZINC 500 [USP'U]/G
OINTMENT TOPICAL
Status: DISCONTINUED | OUTPATIENT
Start: 2017-03-03 | End: 2017-03-03 | Stop reason: HOSPADM

## 2017-03-03 RX ORDER — DOCUSATE SODIUM 100 MG/1
100 CAPSULE, LIQUID FILLED ORAL 2 TIMES DAILY
Status: DISCONTINUED | OUTPATIENT
Start: 2017-03-03 | End: 2017-03-10 | Stop reason: HOSPADM

## 2017-03-03 RX ORDER — ONDANSETRON 2 MG/ML
4 INJECTION INTRAMUSCULAR; INTRAVENOUS EVERY 4 HOURS PRN
Status: DISCONTINUED | OUTPATIENT
Start: 2017-03-03 | End: 2017-03-10 | Stop reason: HOSPADM

## 2017-03-03 RX ORDER — OXYCODONE HYDROCHLORIDE 10 MG/1
10 TABLET ORAL EVERY 4 HOURS PRN
Status: DISCONTINUED | OUTPATIENT
Start: 2017-03-03 | End: 2017-03-03

## 2017-03-03 RX ORDER — OXYCODONE HYDROCHLORIDE 5 MG/1
5 TABLET ORAL ONCE
Status: COMPLETED | OUTPATIENT
Start: 2017-03-03 | End: 2017-03-03

## 2017-03-03 RX ORDER — DIAZEPAM 5 MG/ML
5 INJECTION, SOLUTION INTRAMUSCULAR; INTRAVENOUS EVERY 6 HOURS PRN
Status: DISCONTINUED | OUTPATIENT
Start: 2017-03-03 | End: 2017-03-10 | Stop reason: HOSPADM

## 2017-03-03 RX ADMIN — HYDROMORPHONE HYDROCHLORIDE 0.5 MG: 2 INJECTION INTRAMUSCULAR; INTRAVENOUS; SUBCUTANEOUS at 17:20

## 2017-03-03 RX ADMIN — HYDROMORPHONE HYDROCHLORIDE 0.5 MG: 2 INJECTION INTRAMUSCULAR; INTRAVENOUS; SUBCUTANEOUS at 16:55

## 2017-03-03 RX ADMIN — MORPHINE SULFATE 3 MG: 4 INJECTION INTRAVENOUS at 13:33

## 2017-03-03 RX ADMIN — OXYCODONE HYDROCHLORIDE 10 MG: 10 TABLET ORAL at 05:05

## 2017-03-03 RX ADMIN — VANCOMYCIN HYDROCHLORIDE 1600 MG: 10 INJECTION, POWDER, LYOPHILIZED, FOR SOLUTION INTRAVENOUS at 12:59

## 2017-03-03 RX ADMIN — GABAPENTIN 300 MG: 300 CAPSULE ORAL at 21:12

## 2017-03-03 RX ADMIN — TIZANIDINE 2 MG: 4 TABLET ORAL at 07:55

## 2017-03-03 RX ADMIN — STANDARDIZED SENNA CONCENTRATE AND DOCUSATE SODIUM 2 TABLET: 8.6; 5 TABLET, FILM COATED ORAL at 07:55

## 2017-03-03 RX ADMIN — HYDROMORPHONE HYDROCHLORIDE: 2 INJECTION INTRAMUSCULAR; INTRAVENOUS; SUBCUTANEOUS at 17:45

## 2017-03-03 RX ADMIN — OXYCODONE HYDROCHLORIDE 5 MG: 5 TABLET ORAL at 11:33

## 2017-03-03 RX ADMIN — OXYCODONE HYDROCHLORIDE 10 MG: 5 SOLUTION ORAL at 16:35

## 2017-03-03 RX ADMIN — MORPHINE SULFATE 3 MG: 4 INJECTION INTRAVENOUS at 07:55

## 2017-03-03 RX ADMIN — DOCUSATE SODIUM 100 MG: 100 CAPSULE ORAL at 09:00

## 2017-03-03 RX ADMIN — CEFTRIAXONE 2 G: 2 INJECTION, POWDER, FOR SOLUTION INTRAMUSCULAR; INTRAVENOUS at 11:33

## 2017-03-03 RX ADMIN — Medication 1 TABLET: at 21:12

## 2017-03-03 RX ADMIN — FENTANYL CITRATE 50 MCG: 50 INJECTION, SOLUTION INTRAMUSCULAR; INTRAVENOUS at 16:30

## 2017-03-03 RX ADMIN — HYDROMORPHONE HYDROCHLORIDE 0.5 MG: 2 INJECTION INTRAMUSCULAR; INTRAVENOUS; SUBCUTANEOUS at 17:00

## 2017-03-03 RX ADMIN — HYDROMORPHONE HYDROCHLORIDE 0.5 MG: 2 INJECTION INTRAMUSCULAR; INTRAVENOUS; SUBCUTANEOUS at 17:15

## 2017-03-03 RX ADMIN — CEFEPIME 2 G: 2 INJECTION, POWDER, FOR SOLUTION INTRAVENOUS at 22:43

## 2017-03-03 RX ADMIN — OXYCODONE HYDROCHLORIDE 10 MG: 10 TABLET ORAL at 00:49

## 2017-03-03 RX ADMIN — VANCOMYCIN HYDROCHLORIDE 1300 MG: 10 INJECTION, POWDER, LYOPHILIZED, FOR SOLUTION INTRAVENOUS at 21:09

## 2017-03-03 RX ADMIN — DOCUSATE SODIUM 100 MG: 100 CAPSULE ORAL at 21:13

## 2017-03-03 RX ADMIN — OXYCODONE HYDROCHLORIDE 10 MG: 10 TABLET ORAL at 09:18

## 2017-03-03 RX ADMIN — GABAPENTIN 300 MG: 300 CAPSULE ORAL at 05:06

## 2017-03-03 RX ADMIN — FENTANYL CITRATE 50 MCG: 50 INJECTION, SOLUTION INTRAMUSCULAR; INTRAVENOUS at 16:35

## 2017-03-03 RX ADMIN — MIDAZOLAM 1 MG: 1 INJECTION INTRAMUSCULAR; INTRAVENOUS at 16:35

## 2017-03-03 RX ADMIN — SODIUM CHLORIDE: 9 INJECTION, SOLUTION INTRAVENOUS at 09:19

## 2017-03-03 RX ADMIN — GABAPENTIN 300 MG: 300 CAPSULE ORAL at 12:59

## 2017-03-03 RX ADMIN — ONDANSETRON 4 MG: 2 INJECTION, SOLUTION INTRAMUSCULAR; INTRAVENOUS at 13:58

## 2017-03-03 RX ADMIN — SODIUM CHLORIDE 1000 ML: 9 INJECTION, SOLUTION INTRAVENOUS at 17:30

## 2017-03-03 RX ADMIN — TIZANIDINE 2 MG: 4 TABLET ORAL at 00:50

## 2017-03-03 RX ADMIN — ACETAMINOPHEN 1000 MG: 500 TABLET, FILM COATED ORAL at 21:12

## 2017-03-03 RX ADMIN — VANCOMYCIN HYDROCHLORIDE 1600 MG: 10 INJECTION, POWDER, LYOPHILIZED, FOR SOLUTION INTRAVENOUS at 05:05

## 2017-03-03 ASSESSMENT — PATIENT HEALTH QUESTIONNAIRE - PHQ9
SUM OF ALL RESPONSES TO PHQ9 QUESTIONS 1 AND 2: 0
2. FEELING DOWN, DEPRESSED, IRRITABLE, OR HOPELESS: NOT AT ALL
1. LITTLE INTEREST OR PLEASURE IN DOING THINGS: NOT AT ALL
SUM OF ALL RESPONSES TO PHQ QUESTIONS 1-9: 0

## 2017-03-03 ASSESSMENT — ENCOUNTER SYMPTOMS
NECK PAIN: 1
COUGH: 0
PALPITATIONS: 0
SHORTNESS OF BREATH: 0
HEADACHES: 1
CHILLS: 1

## 2017-03-03 ASSESSMENT — PAIN SCALES - GENERAL
PAINLEVEL_OUTOF10: 5
PAINLEVEL_OUTOF10: 8
PAINLEVEL_OUTOF10: 8
PAINLEVEL_OUTOF10: 7
PAINLEVEL_OUTOF10: 8
PAINLEVEL_OUTOF10: 8
PAINLEVEL_OUTOF10: 7
PAINLEVEL_OUTOF10: 8
PAINLEVEL_OUTOF10: 4
PAINLEVEL_OUTOF10: 4
PAINLEVEL_OUTOF10: 6

## 2017-03-03 ASSESSMENT — GAIT ASSESSMENTS
DISTANCE (FEET): 50
ASSISTIVE DEVICE: FRONT WHEEL WALKER
GAIT LEVEL OF ASSIST: SUPERVISED

## 2017-03-03 NOTE — PROGRESS NOTES
Hospital Medicine Progress Note, Adult, Complex               Author: Baldomero Phillips Date & Time created: 3/3/2017  2:55 PM      ID/CC: 37 y/o M admitted for severe HA , worsening back pain possible lumbar infection of surgical site and sepsis.     Interval History:  Pt seen and examined during rounds, complaining of severe HA, and also back pain.   Going for surgery today. Neurosurgery following appreciate rec.   ID also consulted as there is concern for meningitis appreciate rec.     Review of Systems:  Review of Systems   Constitutional: Positive for chills.   Respiratory: Negative for cough and shortness of breath.    Cardiovascular: Negative for chest pain and palpitations.   Genitourinary: Negative for dysuria and urgency.   Musculoskeletal: Positive for neck pain.   Neurological: Positive for headaches.   All other systems reviewed and are negative.      Physical Exam:  Physical Exam   Constitutional: He is oriented to person, place, and time.   HENT:   Head: Normocephalic and atraumatic.   Eyes: Conjunctivae are normal. No scleral icterus.   Neck: No JVD present.   Cardiovascular: Normal heart sounds.  Exam reveals no gallop.    No murmur heard.  Pulmonary/Chest: He has no wheezes. He has no rales.   Abdominal: Soft. Bowel sounds are normal. He exhibits no distension. There is no tenderness.   Musculoskeletal: He exhibits no edema.   In the back there is midline vertical incision scar appears well  healed. no  secretion fluids appreciated  but there is erythema present.    Neurological: He is alert and oriented to person, place, and time.   Skin: Skin is warm. There is erythema.   Nursing note and vitals reviewed.      Labs:        Invalid input(s): [id number]      Recent Labs      03/02/17   1100  03/03/17   0251   SODIUM  139  136   POTASSIUM  4.4  3.7   CHLORIDE  103  104   CO2  24  26   BUN  15  11   CREATININE  0.86  0.89   MAGNESIUM   --   2.3   CALCIUM  9.1  9.0     Recent Labs      03/02/17   1100   17   0251   ALTSGPT  18   --    ASTSGOT  25   --    ALKPHOSPHAT  51   --    TBILIRUBIN  1.2   --    GLUCOSE  109*  125*     Recent Labs      17   1100  17   1205  17   0251  17   0850   RBC  6.06   --   5.72   --    HEMOGLOBIN  17.1   --   16.0   --    HEMATOCRIT  49.6   --   46.5   --    PLATELETCT  262   --   261   --    PROTHROMBTM   --   12.8   --   12.0   APTT   --   20.1*   --   23.5*   INR   --   0.93   --   0.86*     Recent Labs      17   1100  17   0251   WBC  11.8*  10.2   NEUTSPOLYS  61.70  55.40   LYMPHOCYTES  21.70*  27.00   MONOCYTES  10.40  10.50   EOSINOPHILS  4.50  5.90   BASOPHILS  0.30  0.30   ASTSGOT  25   --    ALTSGPT  18   --    ALKPHOSPHAT  51   --    TBILIRUBIN  1.2   --            Hemodynamics:  Temp (24hrs), Av.3 °C (97.4 °F), Min:35.6 °C (96.1 °F), Max:36.8 °C (98.3 °F)  Temperature: 36.6 °C (97.9 °F)  Pulse  Av.6  Min: 70  Max: 97Heart Rate (Monitored): 73  Blood Pressure: 116/66 mmHg, NIBP: 133/103 mmHg     Respiratory:    Respiration: 16, Pulse Oximetry: 97 %, O2 Daily Delivery Respiratory : Room Air with O2 Available        RUL Breath Sounds: Clear, RML Breath Sounds: Clear, RLL Breath Sounds: Clear, ALIDA Breath Sounds: Clear, LLL Breath Sounds: Clear  Fluids:    Intake/Output Summary (Last 24 hours) at 17 1455  Last data filed at 17 1154   Gross per 24 hour   Intake   1352 ml   Output    900 ml   Net    452 ml     Weight: 89.5 kg (197 lb 5 oz)  GI/Nutrition:  Orders Placed This Encounter   Procedures   • DIET NPO     Standing Status: Standing      Number of Occurrences: 1      Standing Expiration Date:      Order Specific Question:  Restrict to:     Answer:  Sips with Medications [3]     Medical Decision Making, by Problem:  Active Hospital Problems    Diagnosis   • *Sepsis (CMS-[facility]) [A41.9]  - ?? Meningitis vs infected surgical site  -cont on IV vanco and cefepime  -monitor  -ID also consulted appreciate rec.    •  Headache [R51]  -?? Mening itis  -ID consulted appreciate rec.  -abx as per ID   • Cellulitis [L03.90]  - there is erythema at surgical site   - on IV abx  -there was also concer for any CSAF leak  -Neurosurgery to take to OR today appreciate rec.    • Asthma [J45.909]  -stable  -cont on Rt protocol, inhalers    • Leukocytosis [D72.829]  - on IV abx monitor        Labs reviewed, Medications reviewed and Radiology images reviewed              Antibiotics: Treating active infection/contamination beyond 24 hours perioperative coverage

## 2017-03-03 NOTE — H&P
CHIEF COMPLAINT:  Severe headache and worsening back pain with secretions.    HISTORY OF PRESENT ILLNESS:  The patient is a 36-year-old male with a history   of multiple medical problems including chronic back issues.  He was recently   at this hospital from 02/21/2017 to 02/26/2017 where he underwent a L4-L5   lumbar microdiskectomy by Dr. Calderon.  There were no complications of that   procedure.  He was discharged home and was doing quite well.  However, over   the last 2 days, he was having increasing redness and drainage that was clear   in fluid.  He had a headache 2 days ago and then it went away.  He does have a   history of migraines and then the headache got much worse at about 1:00 a.m.   this morning after he went to the bathroom and had a bowel movement.  He says   the headache is all around his head with no associated vision changes.  It is   the worst headache he has ever had.  He also has worse pain in his neck when   he flexes it upwards.  He also notices that the footdrop initially got a   little bit better after surgery, but now it is getting worse and denies any   saddle anesthesia, denies any fecal or urinary incontinence.  He says that the   back has gotten more red, angry looking and also warm to the touch with   increased secretions, but still no blood or pus.  He has taken some Tylenol at   home but that did not really relieve his headache at all.  He has also had   some associated nausea and vomiting with it as well.  He denies any fevers at   home either.    REVIEW OF SYSTEMS:  All other systems reviewed are negative.    In the ED, IV vancomycin, ceftriaxone and Dr. Calderon was consulted.    PAST MEDICAL HISTORY:  1.  Lumbar laminectomy.  2.  Diskectomy.  3.  Multiple prior back surgeries.  4.  Asthma.  5.  Migraines.    PAST SURGICAL HISTORY:  Recent microdiskectomy by Dr. Calderon of L4-L5.  He has   had a total of 4 back surgeries.    SOCIAL HISTORY:  Occasional alcohol.  No tobacco, no  drugs, does smoke   marijuana occasionally.    FAMILY HISTORY:  Diabetes mellitus, lupus and Sweet syndrome.    ALLERGIES:  None.    MEDICATIONS PRIOR TO ADMISSION:  1.  Albuterol 2 puffs every 6 hours as needed for shortness of breath.  2.  Cholecalciferol 1 capsule daily.  3.  Cyanocobalamin 1 tablet daily.  4.  Docusate 100 mg b.i.d.  5.  Gabapentin 300 mg t.i.d.  6.  Omega-3 fatty acids 1000 mg every day.  7.  Omeprazole 20 mg tablets every day.  8.  Oxycodone immediate release one 10 mg tablet every 4 hours as needed for   severe pain.  9.  Multivitamin tablet daily.  10.  Tizanidine 2 mg tablet t.i.d. as needed.    PHYSICAL EXAMINATION:  VITAL SIGNS:  Temperature is 37.3, heart rate , respiratory rate 16-18,   oxygen saturation 100% on room air, blood pressure 114/79.  GENERAL:  The patient is in no acute distress.  He is A and O x4.  Pleasant   and cooperative.  He has a somewhat flushed face.  Pupils are equal, round and   reactive to light.  Extraocular muscles are intact.  NECK:  Flat JVD.  CARDIOVASCULAR:  Sinus tachycardic.  No murmurs, rubs or gallops, nondisplaced   PMI.  LUNGS:  Clear to auscultation bilaterally.  No use of accessory muscles.  ABDOMEN:  Soft, nontender, nondistended.  Normoactive bowel sounds.  No   hepatosplenomegaly.  EXTREMITIES:  No clubbing, cyanosis or edema.  He is warm peripherally, 2+   radial pulses, equal and symmetric.  BACK:  In the lumbar region, a midline vertical incision scar appears well   healed.  I cannot appreciate any secretion fluids, but he does have a very   demarcated clear line of erythema that is raised, almost erysipelas in nature,   in an oval shape, approximately 5 x 5 cm in the lumbar region.  NEUROLOGIC:  He has footdrop of the right foot and limited range of motion of   the left leg compared to the right leg.  MUSCULOSKELETAL:  Limited range of motion to dorsiflexion, plantar flexion of   the left leg, normal on the right.  No saddle anesthesia  appreciated.  SKIN:  As outlined above; otherwise, no rashes, lesions or excoriations.  PSYCHOLOGICAL:  Appropriate affect, A and O x4.    LABORATORY DATA AND IMAGING:  White blood cell count 11.8, H and H 17.1 and   49.6, platelet count is 262.  CMP:  Sodium is 139, potassium 4.4, BUN and   creatinine 15 and 0.86, AST and ALT normal.  Lactic acid 2.4, then 2.9.    PT/INR 12.8 and 0.93, PTT 20.1.  Wound culture, no organisms at this time.    MRI of the lumbar spine with and without contrast is pending.    Portable chest x-ray, no acute cardiopulmonary process is seen.    ASSESSMENT:  1.  Meningitis.  2.  Severe headache.  3.  Possible lower lumbar infection at the surgical site.  4.  Recent microdiskectomy at L4-L5.  5.  Multiple back surgeries.  6.  Multiple injuries to back.  7.  Asthma.  8.  Footdrop, chronic.  9.  Sepsis secondary to meningitis.    PLAN:  The patient will be admitted to the neurosurgical unit.  Dr. Calderon has   been consulted.  The patient presumptively has meningitis.  At this time, we   cannot do a lumbar puncture as the area to enter appears overtly infected   around the skin; therefore I do not want to introduce any further infection.    I will do IV vancomycin and ceftriaxone.  Dr. Calderon has been consulted.  I   will do an MRI of the lumbar spine with and without contrast and defer MRI of   the brain at this time.  We will need a sample of the fluid collection might need   OR interventions at this time.  NS fluid is 100 mL an hour.  I have also   initiated sepsis protocol.  We will trend the lactic acid and watch closely.    Also get an ESR and CRP as well.  For his headache, we will try to do a   low-dose morphine as well as Tylenol as needed.    CODE STATUS:  Full code, full care.    DIET:  N.p.o. given possible intervention.    DEEP VENOUS THROMBOSIS PROPHYLAXIS:  None secondary to possible surgical   intervention.  Do bilateral SCDs in place.        ____________________________________     MD MATA ABRAHAM / MASOUD    DD:  03/02/2017 18:59:06  DT:  03/02/2017 23:08:23    D#:  [id number]  Job#:  [id number]

## 2017-03-03 NOTE — THERAPY
"Physical Therapy Evaluation completed.   Bed Mobility:  Supine to Sit: Supervised  Transfers: Sit to Stand: Supervised  Gait: Level Of Assist: Supervised with Front-Wheel Walker       Plan of Care: Will benefit from Physical Therapy 3 times per week  Discharge Recommendations: Equipment: Front-Wheel Walker. Post-acute therapy recommended after discharged home.    Pt presents iwth decreased functional mobiltiy most likely due to pain and impaired sensation to L LE. He is to have surgery this afternoon to assess issues in back and will be kept on PT services to ensure mobility and safety. Consider consult with orthotist for custom AFO for L LE given long hx of foot drop.     See \"Rehab Therapy-Acute\" Patient Summary Report for complete documentation.     "

## 2017-03-03 NOTE — CONSULTS
DATE OF SERVICE:  03/03/2017    CHIEF COMPLAINT:  Sudden onset of severe headache and questionable lumbar   wound infection versus meningitis.    HISTORY OF PRESENT ILLNESS:  The patient is a 36-year-old man who underwent an   uneventful, uncomplicated lumbar microdiskectomy on the left side at L4-L5   one week ago, Friday.  He was sent home in good condition with resolution of   the severe pain radiating down his left leg.  He has a persistent footdrop   that was present prior to operation.    He presented to the ER yesterday with severe headache.  He was diaphoretic.    His white count was slightly elevated, but he had also been on high dose   steroids.    He has had persistent drainage from his lumbar incision.    He underwent an MRI scan of the lumbar spine that was performed last night.    The images have been read.  I have reviewed the images independently.    I can see a postoperative fluid collection, which is fairly typical for this   sort of surgery without a large massive fluid, but despite that fact he is   continuing to drain fluid.    The concern is that this is cerebrospinal fluid, although there was no CSF   leak identified at the time of operation and it is unclear whether I put in a   drain postoperatively, but again there was at that time no obvious CSF leak.    On the MRI scan, you can see fluid tracking down to the dura.    I asked him how he is doing today and he tells me that his headache is   somewhat better.  What is also confusing is that he is developed this raised,   indurated, red area along both sides of the incision that looks like it is   right where the tape was placed suggesting contact dermatitis, but that did   not start happening until 3-4 days ago, which were 2-3 days after he had been   discharged from the hospital and his bandage had been taken down.    When I examined him this morning, there was a scant amount of drainage on the   bed _____, but he tells me that that had been  changed and that he has been   draining all night.    I discussed with him the plan for incision and drainage and opening up the   incision, bring in the operative microscope and see whether or not I can see   that, in fact, there is a CSF leak, which was occult at the time of surgery.    He has been n.p.o.  He wishes to proceed with operation.  I called the OR and   put him on the schedule to be determined, but it will happen this afternoon.       ____________________________________     MD JONI MUÑOZ / MASOUD    DD:  03/03/2017 09:48:18  DT:  03/03/2017 13:30:35    D#:  [id number]  Job#:  [id number]

## 2017-03-03 NOTE — CARE PLAN
Problem: Safety  Goal: Will remain free from injury  Outcome: PROGRESSING AS EXPECTED  Strip alarm in place. Call light within reach. Pt uses call light appropriately.

## 2017-03-03 NOTE — PROGRESS NOTES
Patient was getting up and started having a severe headache and vomited once. Patient states that he feels better and wanted to eat something anyway. Patient informed to let nurse know if nausea and vomiting returns.

## 2017-03-03 NOTE — PROGRESS NOTES
Pharmacy Kinetics 36 y.o. male on vancomycin day # 1 3/2/2017    Currently on Vancomycin new start    Indication for Treatment: SSTI, R/O CNS infection    Pertinent history per medical record: Admitted on 3/2/2017 for sepsis secondary to surgical site infection.  Patient had a L4-L5 microdiskectomy on 17, but now the site is swollen, red and hot to the touch with significant drainage. Patient also complains of significant headache and nausea.  Antibiotics initiated for SSTI, rule out CNS infection.    Other antibiotics: ceftriaxone 2 g IV q12h    Allergies: Nkda     List concerns for renal function: lactic acidosis on admission, BMI ~30    Pertinent cultures to date:   3/2 wound culture: in process  3/2 PBC x 2: in process  3/2 urine culture: in process    Recent Labs      17   1100   WBC  11.8*   NEUTSPOLYS  61.70     Recent Labs      17   1100   BUN  15   CREATININE  0.86   ALBUMIN  3.9      Blood pressure 141/85, pulse 97, temperature 37.3 °C (99.1 °F), resp. rate 18, height 1.829 m (6'), weight 99.791 kg (220 lb), SpO2 99 %. Temp (24hrs), Av.3 °C (99.1 °F), Min:37.3 °C (99.1 °F), Max:37.3 °C (99.1 °F)      A/P   1. Vancomycin dose change: initiate 1600 mg IV q8h  2. Next vancomycin level: 3/3 @ 1130  3. Goal trough: 18-22 mcg/mL  4. Comments: new start vancomycin.  Minimal concerns for renal dysfunction and accumulation. Will start dosing per protocol and aim for higher trough given concern for CNS involvement.  Renal indices at baseline. Will check level prior to the 4th total dose. Cultures in process. Will continue to follow.    Tosha Garza, PHARMD

## 2017-03-03 NOTE — PROGRESS NOTES
Assumed care of patient. AOx4. Rates pain 8/10 in his back, head and left lower leg.  PRN medications provided. Patient was given regular diet and is NPO p MN per MD. NS @ 100mL/hr running. Patient is on room air. Patient informed of plan of care and questions were answered. Call light in reach and bed alarm on for safety. Hourly rounding continued.

## 2017-03-03 NOTE — RESPIRATORY CARE
COPD EDUCATION by COPD CLINICAL EDUCATOR  3/3/2017 at 5:40 AM by Ronda Pérez     Patient reviewed by COPD education team. Patient does not qualify for COPD program.

## 2017-03-03 NOTE — PROGRESS NOTES
Patient currently NPO, requesting to eat. MD paged for diet clarification/order. Dr. Tina west with regular diet and NPO after midnight.

## 2017-03-03 NOTE — CONSULTS
DATE OF SERVICE:  03/02/2017    CHIEF COMPLAINT:  Sudden onset of severe headache and discharged from the   lumbar incision.    HISTORY OF PRESENT ILLNESS:  The patient is a 36-year-old man well known to   me.  I have now operated on him a total of 3 times.  Most recently, it will be   a week ago tomorrow or 6 days ago, we performed a left L4-L5 lumbar   microdiskectomy.  The patient was discharged to home in good condition.  He   had been put on high-dose steroids leading up to operation and have   leukocytosis the results of the steroids.    The patient tells me that he was doing relatively well until last night when   he developed a 10/10 headache after getting up to go to the bathroom and he   noticed that his T-shirt was completely soaked through from what would appear   to be serosanguineous fluid coming from his lumbar incision.    Most importantly, there was no evidence of a CSF leak at the time of surgery   and the patient up until last night was not complaining of headache or   positional headache.    PHYSICAL EXAMINATION:  GENERAL:  He is awake, alert, oriented, and cooperative.  He appears to be   very uncomfortable.  Pupils are 3-2 and brisk.  Extraocular muscles are   intact.  NEUROLOGIC:  Motor score is 5/5 in all motor groups tested in the lower   extremities including the iliopsoas, leg flexion, leg extension, dorsiflexion,   plantarflexion with the exception of the left foot drop that he had prior to   operation last week.  He really has not regained much strength in his left   foot.  He can wiggle his toes but he has virtually no dorsiflexion.    His incisions, it appears to be fairly well approximated.  I can express a   small amount of serosanguineous fluid.  There is no frankly purulent material.    Curiously, he has an erythematous ridge around the incision that is probably   an inch and half wide on both sides, it looks like it is a contact   dermatitis.    All of his Steri-Strips were either  fell off or removed by Wednesday or   yesterday this week.    Because of the complaint is severe sudden onset of headache, he is being   covered with antibiotics for meningitis dosing.  His white count is down to   11,800, on the 25th it was 17,500, again probably as a result of steroids.    Hemoglobin and hematocrit are normal, platelet count is normal.    ASSESSMENT AND PLAN:  A 36-year-old man status post before mentioned single   level, uncomplicated, uneventful lumbar microdiskectomy presents with neck   pain, neck stiffness, headache and discharge from his lumbar wound.    He is going to undergo an MRI scan of the lumbar spine.  As previously   mentioned, he is being covered for meningitis.  IV antibiotics have been   ordered.    The case was discussed with Dr. Andrews.    He is going to be admitted to the internal medicine service.       ____________________________________     MD JONI MUÑOZ / MASOUD    DD:  03/02/2017 12:37:33  DT:  03/02/2017 17:30:10    D#:  [id number]  Job#:  [id number]

## 2017-03-03 NOTE — CARE PLAN
Problem: Safety  Goal: Will remain free from injury  Outcome: PROGRESSING AS EXPECTED  Safety maintained. Call light in reach.    Problem: Pain Management  Goal: Pain level will decrease to patient’s comfort goal  Outcome: PROGRESSING SLOWER THAN EXPECTED    03/03/17 0000   OTHER   Nurse Pain Scale 0 - 10  8   Non Verbal Scale  Calm   Comfort Goal Comfort at Rest;Comfort with Movement

## 2017-03-03 NOTE — PROGRESS NOTES
Admitted with migraine and post op incisional drainage. AAAOx4. Amb with cane and 1 assist. Fall at home 2/28/17. PIV right neck # 22 with NS @ 100.  Lumbar incision approximately 5 inches long with redness noted to approximately 2 inches surrounding. Minimal drainage noted. Pt reports pain to head, left lower back and left leg. See PRN medication. Admission profile complete. MRI of lumbar spine ordered.

## 2017-03-03 NOTE — THERAPY
OT consult received, RN reported pt scheduled for sx today. Will defer eval until post op as appropriate.    Rea Albarran, MS OTR/L, pager # [phone]

## 2017-03-04 LAB
ANION GAP SERPL CALC-SCNC: 6 MMOL/L (ref 0–11.9)
BACTERIA UR CULT: NORMAL
BACTERIA WND AEROBE CULT: ABNORMAL
BASOPHILS # BLD AUTO: 0.4 % (ref 0–1.8)
BASOPHILS # BLD: 0.06 K/UL (ref 0–0.12)
BUN SERPL-MCNC: 13 MG/DL (ref 8–22)
CALCIUM SERPL-MCNC: 8.9 MG/DL (ref 8.5–10.5)
CHLORIDE SERPL-SCNC: 101 MMOL/L (ref 96–112)
CO2 SERPL-SCNC: 25 MMOL/L (ref 20–33)
CREAT SERPL-MCNC: 0.86 MG/DL (ref 0.5–1.4)
EOSINOPHIL # BLD AUTO: 0.03 K/UL (ref 0–0.51)
EOSINOPHIL NFR BLD: 0.2 % (ref 0–6.9)
ERYTHROCYTE [DISTWIDTH] IN BLOOD BY AUTOMATED COUNT: 37.7 FL (ref 35.9–50)
GFR SERPL CREATININE-BSD FRML MDRD: >60 ML/MIN/1.73 M 2
GLUCOSE SERPL-MCNC: 165 MG/DL (ref 65–99)
GRAM STN SPEC: ABNORMAL
HCT VFR BLD AUTO: 47.8 % (ref 42–52)
HGB BLD-MCNC: 16.6 G/DL (ref 14–18)
IMM GRANULOCYTES # BLD AUTO: 0.09 K/UL (ref 0–0.11)
IMM GRANULOCYTES NFR BLD AUTO: 0.5 % (ref 0–0.9)
LACTATE BLD-SCNC: 1.8 MMOL/L (ref 0.5–2)
LACTATE BLD-SCNC: 2.2 MMOL/L (ref 0.5–2)
LACTATE BLD-SCNC: 2.9 MMOL/L (ref 0.5–2)
LYMPHOCYTES # BLD AUTO: 1.62 K/UL (ref 1–4.8)
LYMPHOCYTES NFR BLD: 9.8 % (ref 22–41)
MCH RBC QN AUTO: 28.1 PG (ref 27–33)
MCHC RBC AUTO-ENTMCNC: 34.7 G/DL (ref 33.7–35.3)
MCV RBC AUTO: 81 FL (ref 81.4–97.8)
MONOCYTES # BLD AUTO: 0.85 K/UL (ref 0–0.85)
MONOCYTES NFR BLD AUTO: 5.2 % (ref 0–13.4)
NEUTROPHILS # BLD AUTO: 13.82 K/UL (ref 1.82–7.42)
NEUTROPHILS NFR BLD: 83.9 % (ref 44–72)
NRBC # BLD AUTO: 0 K/UL
NRBC BLD AUTO-RTO: 0 /100 WBC
PLATELET # BLD AUTO: 294 K/UL (ref 164–446)
PMV BLD AUTO: 8.8 FL (ref 9–12.9)
POTASSIUM SERPL-SCNC: 4.4 MMOL/L (ref 3.6–5.5)
RBC # BLD AUTO: 5.9 M/UL (ref 4.7–6.1)
SIGNIFICANT IND 70042: ABNORMAL
SIGNIFICANT IND 70042: NORMAL
SITE SITE: ABNORMAL
SITE SITE: NORMAL
SODIUM SERPL-SCNC: 132 MMOL/L (ref 135–145)
SOURCE SOURCE: ABNORMAL
SOURCE SOURCE: NORMAL
VANCOMYCIN TROUGH SERPL-MCNC: 15.5 UG/ML (ref 10–20)
WBC # BLD AUTO: 16.5 K/UL (ref 4.8–10.8)

## 2017-03-04 PROCEDURE — 700111 HCHG RX REV CODE 636 W/ 250 OVERRIDE (IP)

## 2017-03-04 PROCEDURE — 700105 HCHG RX REV CODE 258

## 2017-03-04 PROCEDURE — 700105 HCHG RX REV CODE 258: Performed by: INTERNAL MEDICINE

## 2017-03-04 PROCEDURE — 80048 BASIC METABOLIC PNL TOTAL CA: CPT

## 2017-03-04 PROCEDURE — 83605 ASSAY OF LACTIC ACID: CPT | Mod: 91

## 2017-03-04 PROCEDURE — G8989 SELF CARE D/C STATUS: HCPCS | Mod: CI

## 2017-03-04 PROCEDURE — 80202 ASSAY OF VANCOMYCIN: CPT

## 2017-03-04 PROCEDURE — 97530 THERAPEUTIC ACTIVITIES: CPT

## 2017-03-04 PROCEDURE — 85025 COMPLETE CBC W/AUTO DIFF WBC: CPT

## 2017-03-04 PROCEDURE — A9270 NON-COVERED ITEM OR SERVICE: HCPCS | Performed by: NEUROLOGICAL SURGERY

## 2017-03-04 PROCEDURE — 99232 SBSQ HOSP IP/OBS MODERATE 35: CPT | Performed by: INTERNAL MEDICINE

## 2017-03-04 PROCEDURE — 36415 COLL VENOUS BLD VENIPUNCTURE: CPT

## 2017-03-04 PROCEDURE — 700102 HCHG RX REV CODE 250 W/ 637 OVERRIDE(OP): Performed by: INTERNAL MEDICINE

## 2017-03-04 PROCEDURE — A9270 NON-COVERED ITEM OR SERVICE: HCPCS | Performed by: INTERNAL MEDICINE

## 2017-03-04 PROCEDURE — 700111 HCHG RX REV CODE 636 W/ 250 OVERRIDE (IP): Performed by: INTERNAL MEDICINE

## 2017-03-04 PROCEDURE — G8980 MOBILITY D/C STATUS: HCPCS | Mod: CI

## 2017-03-04 PROCEDURE — G8979 MOBILITY GOAL STATUS: HCPCS | Mod: CI

## 2017-03-04 PROCEDURE — G8988 SELF CARE GOAL STATUS: HCPCS | Mod: CI

## 2017-03-04 PROCEDURE — 700102 HCHG RX REV CODE 250 W/ 637 OVERRIDE(OP): Performed by: NEUROLOGICAL SURGERY

## 2017-03-04 PROCEDURE — 770006 HCHG ROOM/CARE - MED/SURG/GYN SEMI*

## 2017-03-04 PROCEDURE — 700112 HCHG RX REV CODE 229: Performed by: NEUROLOGICAL SURGERY

## 2017-03-04 PROCEDURE — 97116 GAIT TRAINING THERAPY: CPT

## 2017-03-04 PROCEDURE — 97165 OT EVAL LOW COMPLEX 30 MIN: CPT

## 2017-03-04 PROCEDURE — G8987 SELF CARE CURRENT STATUS: HCPCS | Mod: CI

## 2017-03-04 RX ADMIN — CEFEPIME 2 G: 2 INJECTION, POWDER, FOR SOLUTION INTRAVENOUS at 06:09

## 2017-03-04 RX ADMIN — DOCUSATE SODIUM 100 MG: 100 CAPSULE ORAL at 07:34

## 2017-03-04 RX ADMIN — VANCOMYCIN HYDROCHLORIDE 1300 MG: 10 INJECTION, POWDER, LYOPHILIZED, FOR SOLUTION INTRAVENOUS at 04:10

## 2017-03-04 RX ADMIN — VANCOMYCIN HYDROCHLORIDE 1500 MG: 10 INJECTION, POWDER, LYOPHILIZED, FOR SOLUTION INTRAVENOUS at 20:38

## 2017-03-04 RX ADMIN — VANCOMYCIN HYDROCHLORIDE 1300 MG: 10 INJECTION, POWDER, LYOPHILIZED, FOR SOLUTION INTRAVENOUS at 11:46

## 2017-03-04 RX ADMIN — ACETAMINOPHEN 1000 MG: 500 TABLET, FILM COATED ORAL at 11:45

## 2017-03-04 RX ADMIN — OXYCODONE HYDROCHLORIDE 10 MG: 10 TABLET ORAL at 07:34

## 2017-03-04 RX ADMIN — CEFEPIME 2 G: 2 INJECTION, POWDER, FOR SOLUTION INTRAVENOUS at 13:51

## 2017-03-04 RX ADMIN — DIAZEPAM 5 MG: 5 TABLET ORAL at 17:01

## 2017-03-04 RX ADMIN — OXYCODONE HYDROCHLORIDE 15 MG: 5 TABLET ORAL at 11:45

## 2017-03-04 RX ADMIN — ACETAMINOPHEN 1000 MG: 500 TABLET, FILM COATED ORAL at 23:46

## 2017-03-04 RX ADMIN — GABAPENTIN 300 MG: 300 CAPSULE ORAL at 13:50

## 2017-03-04 RX ADMIN — OXYCODONE HYDROCHLORIDE 15 MG: 5 TABLET ORAL at 15:47

## 2017-03-04 RX ADMIN — Medication 1 TABLET: at 20:40

## 2017-03-04 RX ADMIN — ACETAMINOPHEN 1000 MG: 500 TABLET, FILM COATED ORAL at 06:09

## 2017-03-04 RX ADMIN — DOCUSATE SODIUM 100 MG: 100 CAPSULE ORAL at 21:00

## 2017-03-04 RX ADMIN — CEFEPIME 2 G: 2 INJECTION, POWDER, FOR SOLUTION INTRAVENOUS at 22:38

## 2017-03-04 RX ADMIN — GABAPENTIN 300 MG: 300 CAPSULE ORAL at 20:39

## 2017-03-04 RX ADMIN — GABAPENTIN 300 MG: 300 CAPSULE ORAL at 06:09

## 2017-03-04 RX ADMIN — ACETAMINOPHEN 1000 MG: 500 TABLET, FILM COATED ORAL at 17:01

## 2017-03-04 ASSESSMENT — ENCOUNTER SYMPTOMS
CHILLS: 0
HEADACHES: 1
VOMITING: 0
PALPITATIONS: 0
FEVER: 0
NAUSEA: 0
BACK PAIN: 1
SHORTNESS OF BREATH: 0
COUGH: 0
NECK PAIN: 1
CHILLS: 1

## 2017-03-04 ASSESSMENT — ACTIVITIES OF DAILY LIVING (ADL): TOILETING: INDEPENDENT

## 2017-03-04 ASSESSMENT — PAIN SCALES - GENERAL
PAINLEVEL_OUTOF10: 4
PAINLEVEL_OUTOF10: 4
PAINLEVEL_OUTOF10: 5
PAINLEVEL_OUTOF10: 4
PAINLEVEL_OUTOF10: 4
PAINLEVEL_OUTOF10: 7
PAINLEVEL_OUTOF10: 7

## 2017-03-04 ASSESSMENT — GAIT ASSESSMENTS
GAIT LEVEL OF ASSIST: SUPERVISED
ASSISTIVE DEVICE: FRONT WHEEL WALKER
DISTANCE (FEET): 200

## 2017-03-04 ASSESSMENT — PATIENT HEALTH QUESTIONNAIRE - PHQ9
1. LITTLE INTEREST OR PLEASURE IN DOING THINGS: NOT AT ALL
SUM OF ALL RESPONSES TO PHQ QUESTIONS 1-9: 0
SUM OF ALL RESPONSES TO PHQ9 QUESTIONS 1 AND 2: 0
2. FEELING DOWN, DEPRESSED, IRRITABLE, OR HOPELESS: NOT AT ALL

## 2017-03-04 NOTE — PROGRESS NOTES
Pt has had severe headache today. Pt was returned to surgery to discover a dural leak that was repaired. Pt states he is feeling much better after repair. Pt now has PCA dilaudid, NS @ 100, wyman catheter. HOB should not be up more than 30 degrees. Weakness to LLE with tingling, neither are new.

## 2017-03-04 NOTE — PROGRESS NOTES
Hospital Medicine Progress Note, Adult, Complex               Author: Baldomero Phillips Date & Time created: 3/4/2017  2:48 PM      ID/CC: 35 y/o M admitted for severe HA , worsening back pain possible lumbar infection of surgical site and sepsis.     Interval History:  No overnight events afebrile, HA has improved. Had surgical repair yesterday  Neurosurgery and ID following appreciate rec.     Review of Systems:  Review of Systems   Constitutional: Positive for chills.   Respiratory: Negative for cough and shortness of breath.    Cardiovascular: Negative for chest pain and palpitations.   Genitourinary: Negative for dysuria and urgency.   Musculoskeletal: Positive for neck pain.   Neurological: Positive for headaches.   All other systems reviewed and are negative.      Physical Exam:  Physical Exam   Constitutional: He is oriented to person, place, and time.   HENT:   Head: Normocephalic and atraumatic.   Eyes: Conjunctivae are normal. No scleral icterus.   Neck: No JVD present.   Cardiovascular: Normal heart sounds.  Exam reveals no gallop.    No murmur heard.  Pulmonary/Chest: He has no wheezes. He has no rales.   Abdominal: Soft. Bowel sounds are normal. He exhibits no distension. There is no tenderness.   Musculoskeletal: He exhibits no edema.   In the back there is midline vertical incision scar appears well  healed. no  secretion fluids appreciated  but there is erythema present.    Neurological: He is alert and oriented to person, place, and time.   Skin: Skin is warm. There is erythema.   Nursing note and vitals reviewed.      Labs:        Invalid input(s): [id number]      Recent Labs      03/02/17   1100  03/03/17   0251  03/04/17   0246   SODIUM  139  136  132*   POTASSIUM  4.4  3.7  4.4   CHLORIDE  103  104  101   CO2  24  26  25   BUN  15  11  13   CREATININE  0.86  0.89  0.86   MAGNESIUM   --   2.3   --    CALCIUM  9.1  9.0  8.9     Recent Labs      03/02/17   1100  03/03/17   0251  03/04/17   0246    ALTSGPT  18   --    --    ASTSGOT  25   --    --    ALKPHOSPHAT  51   --    --    TBILIRUBIN  1.2   --    --    GLUCOSE  109*  125*  165*     Recent Labs      17   1100  17   1205  17   0251  17   0850  17   0246   RBC  6.06   --   5.72   --   5.90   HEMOGLOBIN  17.1   --   16.0   --   16.6   HEMATOCRIT  49.6   --   46.5   --   47.8   PLATELETCT  262   --   261   --   294   PROTHROMBTM   --   12.8   --   12.0   --    APTT   --   20.1*   --   23.5*   --    INR   --   0.93   --   0.86*   --      Recent Labs      17   1100  17   0251  17   0246   WBC  11.8*  10.2  16.5*   NEUTSPOLYS  61.70  55.40  83.90*   LYMPHOCYTES  21.70*  27.00  9.80*   MONOCYTES  10.40  10.50  5.20   EOSINOPHILS  4.50  5.90  0.20   BASOPHILS  0.30  0.30  0.40   ASTSGOT  25   --    --    ALTSGPT  18   --    --    ALKPHOSPHAT  51   --    --    TBILIRUBIN  1.2   --    --            Hemodynamics:  Temp (24hrs), Av.5 °C (97.7 °F), Min:36.2 °C (97.1 °F), Max:37.1 °C (98.7 °F)  Temperature: 37.1 °C (98.7 °F)  Pulse  Av.7  Min: 68  Max: 100Heart Rate (Monitored): 77  Blood Pressure: (!) 99/60 mmHg, NIBP: 127/48 mmHg     Respiratory:    Respiration: 16, Pulse Oximetry: 94 %        RUL Breath Sounds: Clear, RML Breath Sounds: Clear, RLL Breath Sounds: Clear, ALIDA Breath Sounds: Clear, LLL Breath Sounds: Clear  Fluids:    Intake/Output Summary (Last 24 hours) at 17 1448  Last data filed at 17 0642   Gross per 24 hour   Intake   3000 ml   Output   4260 ml   Net  -1260 ml        GI/Nutrition:  Orders Placed This Encounter   Procedures   • DIET ORDER     Standing Status: Standing      Number of Occurrences: 1      Standing Expiration Date:      Order Specific Question:  Diet:     Answer:  Regular [1]     Medical Decision Making, by Problem:  Active Hospital Problems    Diagnosis   • *Sepsis (CMS-HCC) [A41.9]  - ?? Meningitis vs infected surgical site  -ID following , abx as per ID rec.    -wound cx growing strep viridans   • Headache [R51]  -?? Meningitid  -ID consulted appreciate rec.  -abx as per ID   • Cellulitis [L03.90]  - there is erythema at surgical site   - on IV abx  -ID following    • Asthma [J45.909]  -stable  -cont on Rt protocol, inhalers    • Leukocytosis [D72.829]  - on IV abx monitor    L4-L5 CSF leak  -s/p surgical repair.     Labs reviewed, Medications reviewed and Radiology images reviewed              Antibiotics: Treating active infection/contamination beyond 24 hours perioperative coverage

## 2017-03-04 NOTE — OP REPORT
DATE OF SERVICE:  03/03/2017    PRINCIPAL SURGEON:  Christopher Calderon MD    FIRST SURGICAL ASSISTANT:  None.    PREOPERATIVE DIAGNOSES:  Lumbar wound dehiscence and lumbar seroma versus   cerebrospinal fluid leak.    POSTOPERATIVE DIAGNOSES:  Lumbar wound dehiscence and lumbar seroma versus   cerebrospinal fluid leak.    PRINCIPAL PROCEDURES:  1.  Incision and drainage of lumbar wound with identification of small CSF   leak in the most medial portion of the dura from the previous hemilaminectomy   segment at L4-L5 on the left side.  2.  Intraoperative microscope with microdissection technique.    ANESTHESIA:  The case is done under general anesthesia.    ESTIMATED BLOOD LOSS:  Less than 2 mL.    COMPLICATIONS:  There were no intraoperative complications.    BRIEF HISTORY:  The patient is a 36-year-old man who was admitted to the   internal medicine service approximately a week-and-a-half ago with complaints   of severe pain radiating down his left leg.  He had undergone an MRI scan of   the lumbar spine, which demonstrated a large herniated disk coming from the   L4-L5 disk space with a caudal fragment down to the pedicle of L5.    Over the next day or two after admission, he developed a significant foot   drop, which necessitated urgent operation.  He was taken to the operating room   a week ago today.  He underwent what appeared to be an uneventful   uncomplicated left L4-L5 lumbar microdiskectomy.  At the time of that   operation, there was no evidence of a CSF leak.    The patient did not complain of any positional headache following operation.    He was discharged to home in good condition.  He represented to the ER   yesterday afternoon stating he woke up with a 10/10 headache at 1:00 in the   morning and there was a gush of fluid that came from his incision.    He was admitted to the internal medicine service.  An MRI scan of the lumbar   spine with and without gadolinium was obtained, which demonstrated what  would   appear to standard postoperative changes without evidence of a large fluid   collection.    When I examined the patient this morning, he told me that he had fluid   continuously draining out through the night.  Because of that, we decided to   put him on the schedule for an incision and drainage and exploration of the   lumbar wound and determine whether or not he had a CSF leak.    Informed consent was obtained.  We discussed the risks and benefits of surgery   as well as realistic expectations and outcome of the operation.    OPERATIVE REPORT:  The patient was taken to the operating room where he   underwent endotracheal intubation general anesthesia on the operating room   cart.  He was then carefully rolled into the prone position on the OSI spine   table.  Pressure points including his face, elbows, hands, knees, ankle, and   feet were padded and checked.  The existing Steri-Strips had already been   removed from the previous operation.  We did a prep with a surgical scrub   brush followed by Betadine gel.    After prepping the area of the previous incision, the incision was opened with   Metzenbaum scissors.  I removed the superficial suture material, then removed   the deeper suture material in thoracolumbar fascia.  There was an immediate   gush of clear fluid.  I aspirated approximately 10 mL of the fluid and sent it   off to microbiology in a sterile container that would be evaluated for Gram   stain and culture, aerobic and anaerobic.    The operative microscope was sterilely draped and brought into the field.    Using the operative microscope, I probed deep down the level of the dura.  I   could see a CSF welling up from underneath the lamina, medial to where we did   the bony work.    Using a right angle nerve hook, I dissected off the dura from the lamina and   adherent ligamentum flavum, created a larger laminotomy opening more towards   the midline so I could suture the small durotomy.  The  durotomy was closed   with interrupted figure-of-eight suture and then #1 interrupted suture more   distal.  The entire length of the dural opening was only approximately 3 mm.    The wound was reirrigated with normal saline and treated with antibiotics.  I   then made an application of Tisseel blue glue over the suture line.    The thoracolumbar fascia was reapproximated with interrupted 0 Vicryl followed   by running 0 Vicryl followed by interrupted 0 Vicryl in the subcuticular   plain followed by interrupted 2-0 Vicryl in the skin followed by staples.  The   sponge and needle count were correct at the end of the case.  A sterile   dressing was applied.  There were no intraoperative complications.       ____________________________________     MD JONI MUÑOZ / MASOUD    DD:  03/03/2017 16:14:31  DT:  03/03/2017 18:35:46    D#:  [id number]  Job#:  [id number]

## 2017-03-04 NOTE — PROGRESS NOTES
"Pharmacy Kinetics 36 y.o. male on vancomycin day # 3  3/4/2017    Currently on Vancomycin 1300 mg iv q8hr    Indication for Treatment: Rule out meningitis     Pertinent history per medical record: Admitted on 3/2/2017 for evaluation of post-operative wound infection.  Pt underwent microdiskectomy by Dr. Calderon on 17; hx multiple back surgeries.  Several days postop pt developed severe HA, neck PA, and foot drop along with suspected surgical site infection.  Meningitis AC, but no LP due to overlying infxn. ID is consulting.  Empiric abx have been started for meningitis workup.     Other antibiotics: cefepime 2 g iv q 8 hrs    Allergies: Nkda     List concerns for renal function: contrast with MRI on 3/2    Pertinent cultures to date:   3/2/17: PBC X 2 = NGTD  3/2/17: Lower back wound = Viridans Streptococcus Isolated from enrichment broth only, please correlate with clinical condition and Coagulase-negative Staphylococcus species Isolated from enrichment broth only, please correlate with clinical condition  3/2/17: Urine = NGTD  3/3/17: Lumbar spinal fluid = Rare WBCs. No organisms seen    Recent Labs      17   1100  17   0251  17   0246   WBC  11.8*  10.2  16.5*   NEUTSPOLYS  61.70  55.40  83.90*     Recent Labs      17   1100  17   0251  17   0246   BUN  15  11  13   CREATININE  0.86  0.89  0.86   ALBUMIN  3.9   --    --      Recent Labs      17   1137  17   1136   VANCOTROUGH  16.5  15.5     Intake/Output Summary (Last 24 hours) at 17 1411  Last data filed at 17 0642   Gross per 24 hour   Intake   3000 ml   Output   4260 ml   Net  -1260 ml      Blood pressure 99/60, pulse 75, temperature 37.1 °C (98.7 °F), resp. rate 16, height 1.829 m (6' 0.01\"), weight 89.5 kg (197 lb 5 oz), SpO2 94 %. Temp (24hrs), Av.6 °C (97.8 °F), Min:36.2 °C (97.1 °F), Max:37.1 °C (98.7 °F)      A/P   1. Vancomycin dose change: yes, increase to vanco 1500 mg iv q 8 " hrs  2. Next vancomycin level: was today = 15.5 mcg/ml   3. Goal trough: 18-22 mcg/ml for CNS penetration  4. Comments: Vanco dosing had been adjusted due to weight update. Trough level with new dosing was sub-therapeutic at 15.5 mcg/ml. Will increase vanco dosing as above to get the vanco trough into goal range. Would recommend checking another vanco level in 2-3 days. Pt had contrast with MRI on 3/2, but renal indices are WNL so far. UOP adequate. Back wound growing Viridans Strep and Coag-negative staph. All other micro negative so far. ID is consulting.    González Siddiqui, PharmD

## 2017-03-04 NOTE — CARE PLAN
Problem: Safety  Goal: Will remain free from injury  Outcome: PROGRESSING AS EXPECTED  Strip alarm in place, pt uses call light appropriately, bed rest instructions provided    Problem: Infection  Goal: Will remain free from infection  Outcome: PROGRESSING AS EXPECTED  Intervention: Assess signs and symptoms of infection  No new s/s of infection noted  Intervention: Implement standard precautions and perform hand washing before and after patient contact  Infection prevention measures in place

## 2017-03-04 NOTE — PROGRESS NOTES
Infectious Disease Progress Note    Author: Kellen Amato M.D.    DOS & Time created: 3/4/2017  2:49 PM    Chief Complaint   Patient presents with   • Migraine   • Post-Op Complications     clear - pus near surgical site       Interval History:  36-year-old WM s/p 4  surgeries on his back.  The last surgery was on 2017 when he underwent  left L4-L5 lumbar microdiskectomy subsequently with copious drainage  3/4 AF WBC 16.5 cxs polymicrobial. Denies SE abx. Pain controlled    Labs Reviewed, Medications Reviewed, Radiology Reviewed and Wound Reviewed.    Review of Systems:  Review of Systems   Constitutional: Negative for fever and chills.   Gastrointestinal: Negative for nausea and vomiting.   Musculoskeletal: Positive for back pain.   All other systems reviewed and are negative.      Hemodynamics:  Temp (24hrs), Av.5 °C (97.7 °F), Min:36.2 °C (97.1 °F), Max:37.1 °C (98.7 °F)  Temperature: 37.1 °C (98.7 °F)  Pulse  Av.7  Min: 68  Max: 100Heart Rate (Monitored): 77  Blood Pressure: (!) 99/60 mmHg, NIBP: 127/48 mmHg       Physical Exam:  Physical Exam   Constitutional: He is oriented to person, place, and time. He appears well-developed and well-nourished. No distress.   HENT:   Head: Normocephalic and atraumatic.   Eyes: EOM are normal. Pupils are equal, round, and reactive to light. No scleral icterus.   Neck: Neck supple.   Cardiovascular: Normal rate.    Pulmonary/Chest: Effort normal. No respiratory distress.   Abdominal: He exhibits no distension.   Musculoskeletal: He exhibits no edema.   Neurological: He is alert and oriented to person, place, and time. Coordination normal.   Skin: No rash noted.   Dressing back   Nursing note and vitals reviewed.      Labs:  Recent Labs      17   1100  17   0251  17   0246   WBC  11.8*  10.2  16.5*   RBC  6.06  5.72  5.90   HEMOGLOBIN  17.1  16.0  16.6   HEMATOCRIT  49.6  46.5  47.8   MCV  81.8  81.3*  81.0*   MCH  28.2  28.0  28.1   RDW   38.5  38.7  37.7   PLATELETCT  262  261  294   MPV  9.3  9.0  8.8*   NEUTSPOLYS  61.70  55.40  83.90*   LYMPHOCYTES  21.70*  27.00  9.80*   MONOCYTES  10.40  10.50  5.20   EOSINOPHILS  4.50  5.90  0.20   BASOPHILS  0.30  0.30  0.40     Recent Labs      03/02/17   1100  03/03/17   0251  03/04/17   0246   SODIUM  139  136  132*   POTASSIUM  4.4  3.7  4.4   CHLORIDE  103  104  101   CO2  24  26  25   GLUCOSE  109*  125*  165*   BUN  15  11  13     Recent Labs      03/02/17   1100  03/03/17   0251  03/04/17   0246   ALBUMIN  3.9   --    --    TBILIRUBIN  1.2   --    --    ALKPHOSPHAT  51   --    --    TOTPROTEIN  6.9   --    --    ALTSGPT  18   --    --    ASTSGOT  25   --    --    CREATININE  0.86  0.89  0.86        Imaging:      Medications:  Current Facility-Administered Medications   Medication Dose Frequency Provider Last Rate Last Dose   • vancomycin 1,500 mg in  mL IVPB  1,500 mg Q8HR González Siddiqui, PHARMD       • oxycodone immediate release (ROXICODONE) tablet 10 mg  10 mg Q4HRS PRN Baldomero Phillips M.D.   10 mg at 03/04/17 0734    Or   • oxycodone immediate-release (ROXICODONE) tablet 15 mg  15 mg Q4HRS PRN Baldomero Phillips M.D.   15 mg at 03/04/17 1145   • morphine (pf) 4 mg/ml injection 3 mg  3 mg Q4HRS PRN Baldomero Phillips M.D.   3 mg at 03/03/17 1333   • cefepime (MAXIPIME) 2 g in  mL IVPB  2 g Q8HRS Naina Gomez M.D. 200 mL/hr at 03/04/17 1351 2 g at 03/04/17 1351   • Pharmacy Consult Request ...Pain Management Review 1 Each  1 Each PRN Christopher Calderon M.D.       • MD ALERT...Do not administer NSAIDS or ASPIRIN unless ORDERED By Neurosurgery 1 Each  1 Each PRN Christopher Calderon M.D.       • docusate sodium (COLACE) capsule 100 mg  100 mg BID Christopher Calderon M.D.   100 mg at 03/04/17 0734   • senna-docusate (PERICOLACE or SENOKOT S) 8.6-50 MG per tablet 1 Tab  1 Tab Nightly Christopher Calderon M.D.   1 Tab at 03/03/17 2112   • senna-docusate (PERICOLACE or SENOKOT S) 8.6-50 MG per tablet 1 Tab  1 Tab Q24HRS PRN  Christopher Calderon M.D.       • polyethylene glycol/lytes (MIRALAX) PACKET 1 Packet  1 Packet BID PRN Christopher Calderon M.D.       • magnesium hydroxide (MILK OF MAGNESIA) suspension 30 mL  30 mL QDAY PRN Christopher Calderon M.D.       • bisacodyl (DULCOLAX) suppository 10 mg  10 mg Q24HRS PRN Christopher Calderon M.D.       • fleet enema 133 mL  1 Each Once PRN Christopher Calderon M.D.       • acetaminophen (TYLENOL) tablet 1,000 mg  1,000 mg Q6HRS Christopher Calderon M.D.   1,000 mg at 03/04/17 1145   • HYDROmorphone (DILAUDID) 0.1 mg/mL in 50mL NS   Continuous Christopher Calderon M.D.   Stopped at 03/04/17 0051   • diphenhydrAMINE (BENADRYL) tablet/capsule 25 mg  25 mg Q6HRS PRN Christopher Calderon M.D.        Or   • diphenhydrAMINE (BENADRYL) injection 25 mg  25 mg Q6HRS PRN Christopher Calderon M.D.       • ondansetron (ZOFRAN) syringe/vial injection 4 mg  4 mg Q4HRS PRN Christopher Calderon M.D.       • ondansetron (ZOFRAN ODT) dispertab 4 mg  4 mg Q4HRS PRN Christopher Calderon M.D.       • promethazine (PHENERGAN) tablet 12.5-25 mg  12.5-25 mg Q4HRS PRN Christopher Calderon M.D.       • promethazine (PHENERGAN) suppository 12.5-25 mg  12.5-25 mg Q4HRS PRN Christopher Calderon M.D.       • prochlorperazine (COMPAZINE) injection 5-10 mg  5-10 mg Q4HRS PRN Christopher Calderon M.D.       • diazepam (VALIUM) tablet 5 mg  5 mg Q6HRS PRN Christopher Calderon M.D.        Or   • diazepam (VALIUM) injection 5 mg  5 mg Q6HRS PRN Christopher Calderon M.D.       • albuterol inhaler 2 Puff  2 Puff Q6HRS PRN Caleb Wilder M.D.       • gabapentin (NEURONTIN) capsule 300 mg  300 mg TID Caleb Wilder M.D.   300 mg at 03/04/17 1350   • tizanidine (ZANAFLEX) tablet 2 mg  2 mg TID PRN Caleb Wilder M.D.   2 mg at 03/03/17 0755   • NS infusion 2,994 mL  30 mL/kg Once PRN Caleb Wilder M.D.       • Respiratory Care per Protocol   Continuous RT Caleb Wilder M.D.       • NS (BOLUS) infusion 500 mL  500 mL Once PRN Caleb Wilder M.D.       • MD ALERT... vancomycin per pharmacy  protocol   ANNY Wilder M.D.       • acetaminophen (TYLENOL) tablet 500 mg  500 mg Q6HRS ANNY Wilder M.D.         Last reviewed on 3/3/2017  2:53 PM by Darlene Tabares R.N.    Micro:  Results     Procedure Component Value Units Date/Time    CULTURE WOUND W/ GRAM STAIN [[id number]]  (Abnormal) Collected:  03/02/17 1140    Order Status:  Completed Specimen Information:  Wound Updated:  03/04/17 1025     Gram Stain Result No organisms seen.      Significant Indicator POS (POS)      Source WND      Site Lower Back      Culture Result Wound -- (A)      Result:        Growth noted after further incubation,see below for  organism identification.       Culture Result Wound -- (A)      Result:        Viridans Streptococcus  Isolated from enrichment broth only, please correlate with  clinical condition.       Culture Result Wound -- (A)      Result:        Coagulase-negative Staphylococcus species  Isolated from enrichment broth only, please correlate with  clinical condition.      Narrative:      Leaking fluid from Surgical incision    URINE CULTURE(NEW) [[id number]] Collected:  03/02/17 1510    Order Status:  Completed Specimen Information:  Urine Updated:  03/04/17 0723     Significant Indicator NEG      Source UR      Site --      Urine Culture No growth at 48 hours     Narrative:      Indication for culture:->Emergency Room Patient    GRAM STAIN [[id number]] Collected:  03/03/17 1522    Order Status:  Completed Specimen Information:  CSF Updated:  03/03/17 1838     Significant Indicator .      Source CSF      Site Lumbar Spinal Fluid      Gram Stain Result --      Result:        Rare WBCs.  No organisms seen.      ANAEROBIC CULTURE [[id number]] Collected:  03/03/17 1522    Order Status:  Completed Specimen Information:  Other Updated:  03/03/17 1815    CSF CULTURE [[id number]] Collected:  03/03/17 1522    Order Status:  Completed Specimen Information:  Other Updated:  03/03/17 1815    CSF CULTURE [[id number]]  "    Order Status:  No result Specimen Information:  CSF from Tap     BLOOD CULTURE [[id number]] Collected:  03/02/17 1100    Order Status:  Completed Specimen Information:  Blood from Peripheral Updated:  03/03/17 0744     Significant Indicator NEG      Source BLD      Site PERIPHERAL      Blood Culture --      Result:        No Growth    Note: Blood cultures are incubated for 5 days and  are monitored continuously.Positive blood cultures  are called to the RN and reported as soon as  they are identified.      Narrative:      Per Hospital Policy: Only change Specimen Src: to \"Line\" if  specified by physician order.    BLOOD CULTURE [[id number]] Collected:  03/02/17 1138    Order Status:  Completed Specimen Information:  Blood from Peripheral Updated:  03/03/17 0744     Significant Indicator NEG      Source BLD      Site PERIPHERAL      Blood Culture --      Result:        No Growth    Note: Blood cultures are incubated for 5 days and  are monitored continuously.Positive blood cultures  are called to the RN and reported as soon as  they are identified.      Narrative:      Per Hospital Policy: Only change Specimen Src: to \"Line\" if  specified by physician order.    GRAM STAIN [[id number]] Collected:  03/02/17 1140    Order Status:  Completed Specimen Information:  Wound Updated:  03/02/17 1608     Significant Indicator .      Source WND      Site Lower Back      Gram Stain Result No organisms seen.     Narrative:      Leaking fluid from Surgical incision    URINALYSIS [[id number]]  (Abnormal) Collected:  03/02/17 1510    Order Status:  Completed Specimen Information:  Urine Updated:  03/02/17 1545     Micro Urine Req Microscopic      Color Yellow      Character Hazy (A)      Specific Gravity 1.005      Ph >=9.0 (A)      Glucose Negative mg/dL      Ketones Negative mg/dL      Bilirubin Negative      Nitrite Negative      Leukocyte Esterase Negative      Occult Blood Negative     Narrative:      Indication for " culture:->Emergency Room Patient    Blood Culture [[id number]]     Order Status:  Canceled Specimen Information:  Blood from Peripheral     Culture Respiratory W/ GRM STN [[id number]]     Order Status:  Completed Specimen Information:  Respirate from Sputum     Blood Culture [[id number]]     Order Status:  Canceled Specimen Information:  Blood from Peripheral     CSF CULTURE [[id number]] Collected:  03/02/17 0000    Order Status:  Canceled Specimen Information:  Other from Shunt     Narrative:      TEST CSF Culture WAS CANCELLED, 03/02/2017 13:04  error.[email or web address] Avila  Leaking fluid from Surgical incision    Urinalysis [[id number]] Collected:  03/02/17 0000    Order Status:  Canceled Specimen Information:  Urine     Narrative:      TEST Urinalysis WAS CANCELLED, 03/02/2017 15:18 Duplicate .  03/02/2017  15:18  If not done within the last 24 hours  Send central line distal (brown) port venous blood gas          Assessment:  Active Hospital Problems    Diagnosis   • *Sepsis (CMS-HCC) [A41.9]   • Headache [R51]   • Cellulitis [L03.90]   • Asthma [J45.909]   • Leukocytosis [D72.829]       Plan:  Postoperative CSF leak.  Possible meningitis, infected seroma   Afebrile  No leukocytosis  OR cultures, polymicrobial.    Continue cefepime and vancomycin while awaiting final cxs  Anticipate will require IV abx   Monitor renal function closely while on vancomycin    Discussed with internal medicine

## 2017-03-04 NOTE — CARE PLAN
Problem: Safety  Goal: Will remain free from injury  Outcome: PROGRESSING AS EXPECTED  Strip alarm in place. Uses call light appropriately    Problem: Pain Management  Goal: Pain level will decrease to patient’s comfort goal  Relaxation techniques, prn medication as needed

## 2017-03-04 NOTE — PROGRESS NOTES
"Pharmacy Kinetics 36 y.o. male on vancomycin day # 2 3/3/2017    Currently on Vancomycin 1600 mg iv q8hr    Indication for Treatment:   Rule out meningitis     Pertinent history per medical record:   Admitted on 3/2/2017 for evaluation of post-operative wound infection.  Pt underwent microdiskectomy by Dr. Calderon on 17; hx multiple back surgeries.  Several days postop pt developed severe HA, neck PA, and foot drop along with suspected surgical site infection.  Meningitis AC, but no LP due to overlying infxn. ID to consult.  Empiric abx have been started for meningitis workup.     Other antibiotics:   Cefepime 2 gm iv q8h     Allergies:  Nkda     List concerns for renal function:   Lactic acidosis on admission, BMI ~30    Pertinent cultures to date:   3/2 wound cxs - no growth at 24 hours; no organisms seen on gram stain  3/2 urine - in process     Recent Labs      17   1100  17   0251   WBC  11.8*  10.2   NEUTSPOLYS  61.70  55.40     Recent Labs      17   1100  17   0251   BUN  15  11   CREATININE  0.86  0.89   ALBUMIN  3.9   --      Recent Labs      17   1137   VANCOTROUGH  16.5     Intake/Output Summary (Last 24 hours) at 17 1752  Last data filed at 17 1620   Gross per 24 hour   Intake   3152 ml   Output    910 ml   Net   2242 ml      Blood pressure 118/85, pulse 84, temperature 36.6 °C (97.9 °F), resp. rate 17, height 1.829 m (6' 0.01\"), weight 89.5 kg (197 lb 5 oz), SpO2 99 %. Temp (24hrs), Av.3 °C (97.3 °F), Min:35.6 °C (96.1 °F), Max:36.6 °C (97.9 °F)      A/P   1. Vancomycin dose change: reduce dose to 1300 mg iv q8h (04, 12, 20)  2. Next vancomycin level: 3 @ 1130 (ordered)   3. Goal trough: ~20  4. Comments: Dose reduced as pt weight was significantly different than what was originally charted as stated weight.  Pt is currently in the OR, per MD note pt found to have CSF leak (CSF sent to lab). MRI of brain is cancelled as this explains HA and neck pain " (per neurosurg post-op note).  No leukocytosis or fevers.  Pt has been hemodynamically stable; renal indices stable compared to admit values.  Pharmacy will continue to monitor and adjust or de-escalate as appropriate.      Alexa Ledesma, PharmD

## 2017-03-04 NOTE — PROGRESS NOTES
AAOx3. Pt had gotten up with PT this am and did really well but headache increased. Pt is now bedrest. PIV SL. Iv ABX. PRN pain meds as needed

## 2017-03-04 NOTE — PROGRESS NOTES
Neurosurgery Post -Op    Findings:  Left  L4-L5 CSF leak, repaired.  CSF sent to lab  Will cancel the MRI brain as we have an explanation for his HA and neck pain.  EBL = 2 cc.

## 2017-03-04 NOTE — PROGRESS NOTES
Aaox4.pleasant.as of this time,he's not complaining of headache.he just has a back pain.back dressing CDI.he's willing to d/c wyman and pca in the morning.continuing hourly rounding.

## 2017-03-04 NOTE — THERAPY
"Physical Therapy Treatment completed.   Bed Mobility:  Supine to Sit: Modified Independent  Transfers: Sit to Stand: Supervised  Gait: Level Of Assist: Supervised with Front-Wheel Walker       Plan of Care: Patient with no further skilled PT needs in the acute care setting at this time  Discharge Recommendations: Equipment: Front-Wheel Walker. Post-acute therapy recommended after discharged home.     See \"Rehab Therapy-Acute\" Patient Summary Report for complete documentation.       "

## 2017-03-04 NOTE — THERAPY
"Occupational Therapy Evaluation completed.   Functional Status:  Pt s/p s/p I&D lumbar wound, durotomy repair, performing ADLs with supervision/MI, c/o HA with oob ADLs. Pt verbalizes no concern with ability to care for self with assist from family, good knowledge of spinal precautions and adherence during ADLs due to previous spinal sx. Pt does not demonstrate further need for acute skilled services at this time.   Plan of Care: Patient with no further skilled OT needs in the acute care setting at this time  Discharge Recommendations:  Equipment: Front-Wheel Walker. Post-acute therapy recommended after discharged home.    See \"Rehab Therapy-Acute\" Patient Summary Report for complete documentation.    "

## 2017-03-04 NOTE — CONSULTS
DATE OF SERVICE:  03/03/2017    REFERRING PHYSICIAN:  Baldomero Phillips MD    REASON FOR CONSULTATION:  Back infection.    HISTORY OF PRESENT ILLNESS:  The patient is a 36-year-old white male who has   no significant medical problems other than back issues.  He has had 4   surgeries on his back.  The last surgery was on 02/24/2017 when he underwent   left L4-L5 lumbar microdiskectomy. While he was at home, he started having CSF   leak, he started having severe headache, and the area around his back was   red.  In view of that, he came into the emergency room.  In the ER, he had a   WBC count of 12,000, and the MRI was done, which showed posterior soft tissue   fluid collection, less likely to be an abscess. In view of question of   meningitis, infectious disease consult has been called.    REVIEW OF SYSTEMS:  Complains of headache, complains of vomiting, complains of   redness around the incision.  No systemic fevers.  Complains of severe   headache, complains of passing out from pain.  No cough.  No shortness of   breath.  Other review of systems is negative per AMA and CMS criteria.    ALLERGIES:  No known drug allergies.    PAST MEDICAL HISTORY:  Chronic back issues.    PAST SURGICAL HISTORY:  Multiple back surgeries.    SOCIAL HISTORY:  Smokes marijuana, quit smoking long time ago, but history of   ex-IVDA up until August of last year.    MEDICATIONS:  Currently, he is on Rocephin, Colace, Neurontin, oxycodone,   vancomycin.    LABORATORY DATA:  His WBC count is 10.2.  Sed rate is 10. Creatinine is 0.89.    UA is negative.  Blood cultures are negative.  Back culture is negative.  MRI   is as above.    PHYSICAL EXAMINATION:  GENERAL:  The patient is nontoxic, but in significant pain.  VITAL SIGNS:  T-max is 98.3, blood pressure is 119/69, pulse is 76.  HEAD AND ENT:  Mucosa is moist.  No oral thrush.  NECK:  Supple.  No lymphadenopathy.  CHEST:  Bilateral air entry.  Clear to auscultation.  CARDIOVASCULAR:  Tachycardic.   No murmurs or gallops heard.  ABDOMEN:  Soft, nontender.  EXTREMITIES:  No edema.  BACK:  He has a rectangular area of induration and erythema, no discharge seen   at the time of exam.  CENTRAL NERVOUS SYSTEM:  Alert and oriented x3.  No gross focal neurological   deficit.    ASSESSMENT:  1.  Postoperative leak.  2.  Cellulitis.  3.  Possible meningitis, although clinically is not behaving like one.    RECOMMENDATIONS:  At this time, I would recommend to discontinue the Rocephin.    Instead start on cefepime, continue with the vancomycin.  Patient is for   surgery today.  Follow the OR cultures.  Continue with the supportive   measures.  I have reviewed all the records.  Plan was discussed with internal   medicine.    Thank you for the consultation.       ____________________________________     ENRIKE CRUZ MD JD / MASOUD    DD:  03/03/2017 12:14:57  DT:  03/03/2017 17:28:44    D#:  [id number]  Job#:  [id number]

## 2017-03-04 NOTE — PROGRESS NOTES
"Neurosurgery :   Pt in bed, resting, arousable, c/o minor surg site pain and occasional leg pain.  States he did walk last night and had a headache as a result    Exam:   AAO, cooperative, df/pf- 5/5, incision with drsg- c/d      Blood pressure 116/68, pulse 68, temperature 36.2 °C (97.2 °F), resp. rate 16, height 1.829 m (6' 0.01\"), weight 89.5 kg (197 lb 5 oz), SpO2 99 %.    Recent Labs      03/02/17   1100  03/03/17   0251  03/04/17   0246   WBC  11.8*  10.2  16.5*   RBC  6.06  5.72  5.90   HEMOGLOBIN  17.1  16.0  16.6   HEMATOCRIT  49.6  46.5  47.8   MCV  81.8  81.3*  81.0*   MCH  28.2  28.0  28.1   MCHC  34.5  34.4  34.7   RDW  38.5  38.7  37.7   PLATELETCT  262  261  294   MPV  9.3  9.0  8.8*     Recent Labs      03/02/17   1100  03/03/17   0251  03/04/17   0246   SODIUM  139  136  132*   POTASSIUM  4.4  3.7  4.4   CHLORIDE  103  104  101   CO2  24  26  25   GLUCOSE  109*  125*  165*   BUN  15  11  13     Recent Labs      03/02/17   1205  03/03/17   0850   APTT  20.1*  23.5*   INR  0.93  0.86*            Intake/Output Summary (Last 24 hours) at 03/04/17 1023  Last data filed at 03/04/17 0642   Gross per 24 hour   Intake   3000 ml   Output   5160 ml   Net  -2160 ml         • oxycodone immediate-release  10 mg      Or   • oxycodone immediate-release  15 mg     • morphine injection  3 mg     • cefepime  2 g Stopped (03/04/17 0639)   • Pharmacy Consult Request  1 Each     • MD ALERT...Do not administer NSAIDS or ASPIRIN unless ORDERED By Neurosurgery  1 Each     • docusate sodium  100 mg     • senna-docusate  1 Tab     • senna-docusate  1 Tab     • polyethylene glycol/lytes  1 Packet     • magnesium hydroxide  30 mL     • bisacodyl  10 mg     • fleet  1 Each     • acetaminophen  1,000 mg     • HYDROmorphone   Stopped (03/04/17 0051)   • diphenhydrAMINE  25 mg      Or   • diphenhydrAMINE  25 mg     • ondansetron  4 mg     • ondansetron  4 mg     • promethazine  12.5-25 mg     • promethazine  12.5-25 mg     • " prochlorperazine  5-10 mg     • diazepam  5 mg      Or   • diazepam  5 mg     • vancomycin (VANCOCIN) IVPB (maintenance dose)  1,300 mg Stopped (03/04/17 0541)   • albuterol  2 Puff     • gabapentin  300 mg     • tizanidine  2 mg     • NS  30 mL/kg     • Respiratory Care per Protocol       • NS  500 mL     • MD ALERT... vancomycin       • acetaminophen  500 mg           Assessment and Plan:  POD # 1 s/p I&D lumbar wound, durotomy repair  NM as above  Keep flat until noon Sunday, do not ambulate      Imaging results  [unfilled]

## 2017-03-05 LAB
ALBUMIN SERPL BCP-MCNC: 3.5 G/DL (ref 3.2–4.9)
ALBUMIN/GLOB SERPL: 1.2 G/DL
ALP SERPL-CCNC: 54 U/L (ref 30–99)
ALT SERPL-CCNC: 18 U/L (ref 2–50)
ANION GAP SERPL CALC-SCNC: 7 MMOL/L (ref 0–11.9)
AST SERPL-CCNC: 20 U/L (ref 12–45)
BASOPHILS # BLD AUTO: 0.4 % (ref 0–1.8)
BASOPHILS # BLD: 0.05 K/UL (ref 0–0.12)
BILIRUB SERPL-MCNC: 0.4 MG/DL (ref 0.1–1.5)
BUN SERPL-MCNC: 8 MG/DL (ref 8–22)
CALCIUM SERPL-MCNC: 9.5 MG/DL (ref 8.5–10.5)
CHLORIDE SERPL-SCNC: 104 MMOL/L (ref 96–112)
CO2 SERPL-SCNC: 24 MMOL/L (ref 20–33)
CREAT SERPL-MCNC: 0.68 MG/DL (ref 0.5–1.4)
EOSINOPHIL # BLD AUTO: 0.33 K/UL (ref 0–0.51)
EOSINOPHIL NFR BLD: 2.4 % (ref 0–6.9)
ERYTHROCYTE [DISTWIDTH] IN BLOOD BY AUTOMATED COUNT: 39 FL (ref 35.9–50)
GFR SERPL CREATININE-BSD FRML MDRD: >60 ML/MIN/1.73 M 2
GLOBULIN SER CALC-MCNC: 2.9 G/DL (ref 1.9–3.5)
GLUCOSE SERPL-MCNC: 125 MG/DL (ref 65–99)
HCT VFR BLD AUTO: 49.5 % (ref 42–52)
HGB BLD-MCNC: 16.9 G/DL (ref 14–18)
IMM GRANULOCYTES # BLD AUTO: 0.17 K/UL (ref 0–0.11)
IMM GRANULOCYTES NFR BLD AUTO: 1.2 % (ref 0–0.9)
LYMPHOCYTES # BLD AUTO: 3.4 K/UL (ref 1–4.8)
LYMPHOCYTES NFR BLD: 24.4 % (ref 22–41)
MCH RBC QN AUTO: 28.1 PG (ref 27–33)
MCHC RBC AUTO-ENTMCNC: 34.1 G/DL (ref 33.7–35.3)
MCV RBC AUTO: 82.2 FL (ref 81.4–97.8)
MONOCYTES # BLD AUTO: 1.28 K/UL (ref 0–0.85)
MONOCYTES NFR BLD AUTO: 9.2 % (ref 0–13.4)
NEUTROPHILS # BLD AUTO: 8.72 K/UL (ref 1.82–7.42)
NEUTROPHILS NFR BLD: 62.4 % (ref 44–72)
NRBC # BLD AUTO: 0 K/UL
NRBC BLD AUTO-RTO: 0 /100 WBC
PLATELET # BLD AUTO: 281 K/UL (ref 164–446)
PMV BLD AUTO: 9.3 FL (ref 9–12.9)
POTASSIUM SERPL-SCNC: 3.5 MMOL/L (ref 3.6–5.5)
PROT SERPL-MCNC: 6.4 G/DL (ref 6–8.2)
RBC # BLD AUTO: 6.02 M/UL (ref 4.7–6.1)
SODIUM SERPL-SCNC: 135 MMOL/L (ref 135–145)
WBC # BLD AUTO: 14 K/UL (ref 4.8–10.8)

## 2017-03-05 PROCEDURE — 700112 HCHG RX REV CODE 229: Performed by: NEUROLOGICAL SURGERY

## 2017-03-05 PROCEDURE — 700105 HCHG RX REV CODE 258: Performed by: INTERNAL MEDICINE

## 2017-03-05 PROCEDURE — 700111 HCHG RX REV CODE 636 W/ 250 OVERRIDE (IP)

## 2017-03-05 PROCEDURE — A9270 NON-COVERED ITEM OR SERVICE: HCPCS | Performed by: NEUROLOGICAL SURGERY

## 2017-03-05 PROCEDURE — 700102 HCHG RX REV CODE 250 W/ 637 OVERRIDE(OP): Performed by: NEUROLOGICAL SURGERY

## 2017-03-05 PROCEDURE — 700111 HCHG RX REV CODE 636 W/ 250 OVERRIDE (IP): Performed by: INTERNAL MEDICINE

## 2017-03-05 PROCEDURE — L1932 AFO RIG ANT TIB PREFAB TCF/=: HCPCS

## 2017-03-05 PROCEDURE — 99232 SBSQ HOSP IP/OBS MODERATE 35: CPT | Performed by: INTERNAL MEDICINE

## 2017-03-05 PROCEDURE — 700105 HCHG RX REV CODE 258

## 2017-03-05 PROCEDURE — A9270 NON-COVERED ITEM OR SERVICE: HCPCS | Performed by: INTERNAL MEDICINE

## 2017-03-05 PROCEDURE — 700102 HCHG RX REV CODE 250 W/ 637 OVERRIDE(OP): Performed by: INTERNAL MEDICINE

## 2017-03-05 PROCEDURE — 80053 COMPREHEN METABOLIC PANEL: CPT

## 2017-03-05 PROCEDURE — 85025 COMPLETE CBC W/AUTO DIFF WBC: CPT

## 2017-03-05 PROCEDURE — 306637 HCHG MISC ORTHO ITEM RC 0274

## 2017-03-05 PROCEDURE — 770006 HCHG ROOM/CARE - MED/SURG/GYN SEMI*

## 2017-03-05 PROCEDURE — 36415 COLL VENOUS BLD VENIPUNCTURE: CPT

## 2017-03-05 RX ADMIN — CEFEPIME 2 G: 2 INJECTION, POWDER, FOR SOLUTION INTRAVENOUS at 14:29

## 2017-03-05 RX ADMIN — GABAPENTIN 300 MG: 300 CAPSULE ORAL at 05:35

## 2017-03-05 RX ADMIN — OXYCODONE HYDROCHLORIDE 10 MG: 10 TABLET ORAL at 14:29

## 2017-03-05 RX ADMIN — VANCOMYCIN HYDROCHLORIDE 1500 MG: 10 INJECTION, POWDER, LYOPHILIZED, FOR SOLUTION INTRAVENOUS at 12:46

## 2017-03-05 RX ADMIN — DOCUSATE SODIUM 100 MG: 100 CAPSULE ORAL at 08:11

## 2017-03-05 RX ADMIN — CEFEPIME 2 G: 2 INJECTION, POWDER, FOR SOLUTION INTRAVENOUS at 23:03

## 2017-03-05 RX ADMIN — OXYCODONE HYDROCHLORIDE 10 MG: 10 TABLET ORAL at 05:35

## 2017-03-05 RX ADMIN — ACETAMINOPHEN 1000 MG: 500 TABLET, FILM COATED ORAL at 12:45

## 2017-03-05 RX ADMIN — VANCOMYCIN HYDROCHLORIDE 1500 MG: 10 INJECTION, POWDER, LYOPHILIZED, FOR SOLUTION INTRAVENOUS at 20:49

## 2017-03-05 RX ADMIN — CEFEPIME 2 G: 2 INJECTION, POWDER, FOR SOLUTION INTRAVENOUS at 05:59

## 2017-03-05 RX ADMIN — ACETAMINOPHEN 1000 MG: 500 TABLET, FILM COATED ORAL at 17:52

## 2017-03-05 RX ADMIN — ACETAMINOPHEN 1000 MG: 500 TABLET, FILM COATED ORAL at 23:03

## 2017-03-05 RX ADMIN — DOCUSATE SODIUM 100 MG: 100 CAPSULE ORAL at 20:50

## 2017-03-05 RX ADMIN — VANCOMYCIN HYDROCHLORIDE 1500 MG: 10 INJECTION, POWDER, LYOPHILIZED, FOR SOLUTION INTRAVENOUS at 03:50

## 2017-03-05 RX ADMIN — GABAPENTIN 300 MG: 300 CAPSULE ORAL at 20:49

## 2017-03-05 RX ADMIN — OXYCODONE HYDROCHLORIDE 10 MG: 10 TABLET ORAL at 19:49

## 2017-03-05 RX ADMIN — ACETAMINOPHEN 1000 MG: 500 TABLET, FILM COATED ORAL at 05:36

## 2017-03-05 RX ADMIN — Medication 1 TABLET: at 20:49

## 2017-03-05 RX ADMIN — OXYCODONE HYDROCHLORIDE 10 MG: 10 TABLET ORAL at 10:01

## 2017-03-05 RX ADMIN — GABAPENTIN 300 MG: 300 CAPSULE ORAL at 14:29

## 2017-03-05 ASSESSMENT — PAIN SCALES - GENERAL
PAINLEVEL_OUTOF10: 4
PAINLEVEL_OUTOF10: 5
PAINLEVEL_OUTOF10: 6
PAINLEVEL_OUTOF10: 7
PAINLEVEL_OUTOF10: 4
PAINLEVEL_OUTOF10: 7
PAINLEVEL_OUTOF10: 4

## 2017-03-05 ASSESSMENT — ENCOUNTER SYMPTOMS
CHILLS: 0
FEVER: 0
COUGH: 0
PALPITATIONS: 0
VOMITING: 0
NECK PAIN: 1
HEADACHES: 1
SHORTNESS OF BREATH: 0
BACK PAIN: 1
NAUSEA: 0

## 2017-03-05 NOTE — PROGRESS NOTES
"Pharmacy Kinetics 36 y.o. male on vancomycin day # 4 3/5/2017    Currently on Vancomycin 1500 mg iv q8hr    Indication for Treatment: meningitis vs surgical site infection    Pertinent history per medical record: Admitted on 3/2/2017 for evaluation of post-operative wound infection.  This patient underwent a microdiskectomy by Dr. Calderon on 17.  He has a history of multiple back surgeries.  Several days postop he developed a severe headache, neck pain, and foot drop along with suspected surgical site infection.  Workup for meningitis vs surgical site infection.  The lumbar puncture was delayed due to overwhelming infection at the surgical site and concerns for introducing bacteria into the CSF.      Other antibiotics: cefepime 2000 mg iv Q8H    Allergies: Nkda     List concerns for renal function: contrast on 3/2    Pertinent cultures to date:   3/2/17 blood, peripheral x 2: NGTD  3/2/17 wound, lower back: viridans streptococcus, coagulase negative staphylococcus species  3/3/17 CSF: rare WBC's, no organism seen    Recent Labs      17   1100  17   0251  17   0246   WBC  11.8*  10.2  16.5*   NEUTSPOLYS  61.70  55.40  83.90*     Recent Labs      17   1100  17   0251  17   0246   BUN  15  11  13   CREATININE  0.86  0.89  0.86   ALBUMIN  3.9   --    --      Recent Labs      17   1137  17   1136   VANCOTROUGH  16.5  15.5     Intake/Output Summary (Last 24 hours) at 17 1013  Last data filed at 17 1700   Gross per 24 hour   Intake      0 ml   Output    900 ml   Net   -900 ml      Blood pressure 112/59, pulse 62, temperature 36.7 °C (98 °F), resp. rate 16, height 1.829 m (6' 0.01\"), weight 89.5 kg (197 lb 5 oz), SpO2 98 %. Temp (24hrs), Av.8 °C (98.2 °F), Min:36.3 °C (97.4 °F), Max:37.3 °C (99.2 °F)      A/P   1. Vancomycin dose change: not indicated at this time  2. Next vancomycin level: ~ 2 days (not ordered)  3. Goal trough: 18-22 mcg/mL  4. Comments: " Per Dr. Amato, continue current antibiotics until cultures are finalized.  No labs today.  I recommend a new level in ~ 2 days to assure the patient is therapeutic.    Rossy Chicas, KerlineD.

## 2017-03-05 NOTE — PROGRESS NOTES
Infectious Disease Progress Note    Author: Kellen Amato M.D.    DOS & Time created: 3/5/2017  2:22 PM    Chief Complaint   Patient presents with   • Migraine   • Post-Op Complications     clear - pus near surgical site       Interval History:  36-year-old WM s/p 4  surgeries on his back.  The last surgery was on 2017 when he underwent  left L4-L5 lumbar microdiskectomy subsequently with copious drainage  3/4 AF WBC 16.5 cxs polymicrobial. Denies SE abx. Pain controlled  3/5 AF WBC 14 still has a lot of back pain  Labs Reviewed, Medications Reviewed, Radiology Reviewed and Wound Reviewed.    Review of Systems:  Review of Systems   Constitutional: Negative for fever and chills.   Gastrointestinal: Negative for nausea and vomiting.   Musculoskeletal: Positive for back pain.   All other systems reviewed and are negative.      Hemodynamics:  Temp (24hrs), Av.7 °C (98 °F), Min:36.3 °C (97.4 °F), Max:37.3 °C (99.2 °F)  Temperature: 36.7 °C (98 °F)  Pulse  Av.9  Min: 62  Max: 103   Blood Pressure: 112/59 mmHg       Physical Exam:  Physical Exam   Constitutional: He is oriented to person, place, and time. He appears well-developed and well-nourished. No distress.   HENT:   Head: Normocephalic and atraumatic.   Eyes: EOM are normal. Pupils are equal, round, and reactive to light. No scleral icterus.   Neck: Neck supple.   Cardiovascular: Normal rate.    Pulmonary/Chest: Effort normal. No respiratory distress.   Abdominal: He exhibits no distension.   Musculoskeletal: He exhibits no edema.   Neurological: He is alert and oriented to person, place, and time. Coordination normal.   Skin: No rash noted.   Dressing back   Nursing note and vitals reviewed.      Labs:  Recent Labs      17   0251  17   0246  17   1102   WBC  10.2  16.5*  14.0*   RBC  5.72  5.90  6.02   HEMOGLOBIN  16.0  16.6  16.9   HEMATOCRIT  46.5  47.8  49.5   MCV  81.3*  81.0*  82.2   MCH  28.0  28.1  28.1   RDW  38.7   37.7  39.0   PLATELETCT  261  294  281   MPV  9.0  8.8*  9.3   NEUTSPOLYS  55.40  83.90*  62.40   LYMPHOCYTES  27.00  9.80*  24.40   MONOCYTES  10.50  5.20  9.20   EOSINOPHILS  5.90  0.20  2.40   BASOPHILS  0.30  0.40  0.40     Recent Labs      03/03/17   0251  03/04/17   0246  03/05/17   1102   SODIUM  136  132*  135   POTASSIUM  3.7  4.4  3.5*   CHLORIDE  104  101  104   CO2  26  25  24   GLUCOSE  125*  165*  125*   BUN  11  13  8     Recent Labs      03/03/17   0251  03/04/17   0246  03/05/17   1102   ALBUMIN   --    --   3.5   TBILIRUBIN   --    --   0.4   ALKPHOSPHAT   --    --   54   TOTPROTEIN   --    --   6.4   ALTSGPT   --    --   18   ASTSGOT   --    --   20   CREATININE  0.89  0.86  0.68        Imaging:  MR-LUMBAR SPINE-WITH & W/O (Final result) Result time: 03/03/17 07:39:03     Final result by Hamlet Ward M.D. (03/03/17 07:39:03)     Impression:     1.  Interval LEFT L5 hemilaminotomy with resolution of previously described disc extrusion.  2.  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  Abscess is felt unlikely.  3.  No epidural abscess identified.  4.  Central canal remains patent.  5.  RIGHT paracentral posterior disc bulging again present at L5-S1.  6.  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   10 mg at 03/05/17 1001    Or   • oxycodone immediate-release (ROXICODONE) tablet 15 mg  15 mg Q4HRS PRN Baldomero Phillips M.D.   15 mg at 03/04/17 1547   • morphine (pf) 4 mg/ml injection 3 mg  3 mg  Q4HRS PRN Baldomero Phillips M.D.   3 mg at 03/03/17 1333   • cefepime (MAXIPIME) 2 g in  mL IVPB  2 g Q8HRS Naina Gomez M.D.   Stopped at 03/05/17 0629   • Pharmacy Consult Request ...Pain Management Review 1 Each  1 Each PRN Chrsitopher Calderon M.D.       • MD ALERT...Do not administer NSAIDS or ASPIRIN unless ORDERED By Neurosurgery 1 Each  1 Each PRN Christopher Calderon M.D.       • docusate sodium (COLACE) capsule 100 mg  100 mg BID Christopher Calderon M.D.   100 mg at 03/05/17 0811   • senna-docusate (PERICOLACE or SENOKOT S) 8.6-50 MG per tablet 1 Tab  1 Tab Nightly Christopher Calderon M.D.   1 Tab at 03/04/17 2040   • senna-docusate (PERICOLACE or SENOKOT S) 8.6-50 MG per tablet 1 Tab  1 Tab Q24HRS PRN Christopher Calderon M.D.       • polyethylene glycol/lytes (MIRALAX) PACKET 1 Packet  1 Packet BID PRN Christopher Calderon M.D.       • magnesium hydroxide (MILK OF MAGNESIA) suspension 30 mL  30 mL QDAY PRN Christopher Calderon M.D.       • bisacodyl (DULCOLAX) suppository 10 mg  10 mg Q24HRS PRN Christopher Calderon M.D.       • fleet enema 133 mL  1 Each Once PRN Christopher Calderon M.D.       • acetaminophen (TYLENOL) tablet 1,000 mg  1,000 mg Q6HRS Christopher Calderon M.D.   1,000 mg at 03/05/17 1245   • diphenhydrAMINE (BENADRYL) tablet/capsule 25 mg  25 mg Q6HRS PRN Christopher Calderon M.D.        Or   • diphenhydrAMINE (BENADRYL) injection 25 mg  25 mg Q6HRS PRN Christopher Calderon M.D.       • ondansetron (ZOFRAN) syringe/vial injection 4 mg  4 mg Q4HRS PRN Christopher Calderon M.D.       • ondansetron (ZOFRAN ODT) dispertab 4 mg  4 mg Q4HRS PRN Christopher Calderon M.D.       • promethazine (PHENERGAN) tablet 12.5-25 mg  12.5-25 mg Q4HRS PRN Christopher Calderon M.D.       • promethazine (PHENERGAN) suppository 12.5-25 mg  12.5-25 mg Q4HRS PRN Christopher Calderon M.D.       • prochlorperazine (COMPAZINE) injection 5-10 mg  5-10 mg Q4HRS PRN Christopher Calderon M.D.       • diazepam (VALIUM) tablet 5 mg  5 mg Q6HRS PRN Christopher Calderon M.D.   5 mg at 03/04/17 1701    Or    • diazepam (VALIUM) injection 5 mg  5 mg Q6HRS PRN Christopher Calderon M.D.       • albuterol inhaler 2 Puff  2 Puff Q6HRS PRN Caleb Wilder M.D.       • gabapentin (NEURONTIN) capsule 300 mg  300 mg TID Caleb Wilder M.D.   300 mg at 03/05/17 0535   • tizanidine (ZANAFLEX) tablet 2 mg  2 mg TID PRN Caleb Wilder M.D.   2 mg at 03/03/17 0755   • NS infusion 2,994 mL  30 mL/kg Once PRN Caleb Wilder M.D.       • Respiratory Care per Protocol   Continuous RT Caleb Wilder M.D.       • NS (BOLUS) infusion 500 mL  500 mL Once PRN Caleb Wilder M.D.       • MD ALERT... vancomycin per pharmacy protocol   PRJENELLE Wilder M.D.       • acetaminophen (TYLENOL) tablet 500 mg  500 mg Q6HRS PRJENELLE Wilder M.D.         Last reviewed on 3/3/2017  2:53 PM by Darlene Tabares R.N.    Micro:  Results     Procedure Component Value Units Date/Time    CSF CULTURE [[id number]] Collected:  03/03/17 1522    Order Status:  Completed Specimen Information:  CSF Updated:  03/05/17 1153     Significant Indicator NEG      Source CSF      Site Lumbar Spinal Fluid      CSF Culture No growth at 48 hours.      Gram Stain Result --      Result:        Rare WBCs.  No organisms seen.      ANAEROBIC CULTURE [[id number]] Collected:  03/03/17 1522    Order Status:  Completed Specimen Information:  CSF Updated:  03/05/17 1153     Significant Indicator NEG      Source CSF      Site Lumbar Spinal Fluid      Anaerobic Culture, Culture Res Culture in progress.     CULTURE WOUND W/ GRAM STAIN [[id number]]  (Abnormal) Collected:  03/02/17 1140    Order Status:  Completed Specimen Information:  Wound Updated:  03/04/17 1025     Gram Stain Result No organisms seen.      Significant Indicator POS (POS)      Source WND      Site Lower Back      Culture Result Wound -- (A)      Result:        Growth noted after further incubation,see below for  organism identification.       Culture Result Wound -- (A)      Result:        Viridans  "Streptococcus  Isolated from enrichment broth only, please correlate with  clinical condition.       Culture Result Wound -- (A)      Result:        Coagulase-negative Staphylococcus species  Isolated from enrichment broth only, please correlate with  clinical condition.      Narrative:      Leaking fluid from Surgical incision    URINE CULTURE(NEW) [[id number]] Collected:  03/02/17 1510    Order Status:  Completed Specimen Information:  Urine Updated:  03/04/17 0723     Significant Indicator NEG      Source UR      Site --      Urine Culture No growth at 48 hours     Narrative:      Indication for culture:->Emergency Room Patient    GRAM STAIN [[id number]] Collected:  03/03/17 1522    Order Status:  Completed Specimen Information:  CSF Updated:  03/03/17 1838     Significant Indicator .      Source CSF      Site Lumbar Spinal Fluid      Gram Stain Result --      Result:        Rare WBCs.  No organisms seen.      CSF CULTURE [[id number]]     Order Status:  No result Specimen Information:  CSF from Tap     BLOOD CULTURE [[id number]] Collected:  03/02/17 1100    Order Status:  Completed Specimen Information:  Blood from Peripheral Updated:  03/03/17 0744     Significant Indicator NEG      Source BLD      Site PERIPHERAL      Blood Culture --      Result:        No Growth    Note: Blood cultures are incubated for 5 days and  are monitored continuously.Positive blood cultures  are called to the RN and reported as soon as  they are identified.      Narrative:      Per Hospital Policy: Only change Specimen Src: to \"Line\" if  specified by physician order.    BLOOD CULTURE [[id number]] Collected:  03/02/17 1138    Order Status:  Completed Specimen Information:  Blood from Peripheral Updated:  03/03/17 0744     Significant Indicator NEG      Source BLD      Site PERIPHERAL      Blood Culture --      Result:        No Growth    Note: Blood cultures are incubated for 5 days and  are monitored continuously.Positive blood " "cultures  are called to the RN and reported as soon as  they are identified.      Narrative:      Per Hospital Policy: Only change Specimen Src: to \"Line\" if  specified by physician order.    GRAM STAIN [[id number]] Collected:  03/02/17 1140    Order Status:  Completed Specimen Information:  Wound Updated:  03/02/17 1608     Significant Indicator .      Source WND      Site Lower Back      Gram Stain Result No organisms seen.     Narrative:      Leaking fluid from Surgical incision    URINALYSIS [[id number]]  (Abnormal) Collected:  03/02/17 1510    Order Status:  Completed Specimen Information:  Urine Updated:  03/02/17 1545     Micro Urine Req Microscopic      Color Yellow      Character Hazy (A)      Specific Gravity 1.005      Ph >=9.0 (A)      Glucose Negative mg/dL      Ketones Negative mg/dL      Bilirubin Negative      Nitrite Negative      Leukocyte Esterase Negative      Occult Blood Negative     Narrative:      Indication for culture:->Emergency Room Patient    Blood Culture [[id number]]     Order Status:  Canceled Specimen Information:  Blood from Peripheral     Culture Respiratory W/ GRM STN [[id number]]     Order Status:  Completed Specimen Information:  Respirate from Sputum     Blood Culture [[id number]]     Order Status:  Canceled Specimen Information:  Blood from Peripheral     CSF CULTURE [[id number]] Collected:  03/02/17 0000    Order Status:  Canceled Specimen Information:  Other from Shunt     Narrative:      TEST CSF Culture WAS CANCELLED, 03/02/2017 13:04  error.ASHLEY Avila  Leaking fluid from Surgical incision    Urinalysis [[id number]] Collected:  03/02/17 0000    Order Status:  Canceled Specimen Information:  Urine     Narrative:      TEST Urinalysis WAS CANCELLED, 03/02/2017 15:18 Duplicate .  03/02/2017  15:18  If not done within the last 24 hours  Send central line distal (brown) port venous blood gas          Assessment:  Active Hospital Problems    Diagnosis   • *Sepsis " (CMS-HCC) [A41.9]   • Asthma [J45.909]   • Leukocytosis [D72.829]       Plan:  Postoperative CSF leak.  Suspect infected seroma   Afebrile  + leukocytosis  OR cultures, polymicrobial.    Continue cefepime and vancomycin while awaiting final cxs  Anticipate will require IV abx   Order PICC  Monitor renal function closely while on vancomycin    Discussed with internal medicine

## 2017-03-05 NOTE — PROGRESS NOTES
"Neurosurgery :   Sub/obj:      A&O x3, GCS 15  PERRL  C/o low back pain, tingling down left leg  Has been up already ambulating to BR.   No headache  LE str 5/5 except left TA 2/5  Inc c/d/i with staples   Voiding  Eating no n/v,         Blood pressure 112/59, pulse 62, temperature 36.7 °C (98 °F), resp. rate 16, height 1.829 m (6' 0.01\"), weight 89.5 kg (197 lb 5 oz), SpO2 98 %.    Recent Labs      03/03/17   0251  03/04/17   0246  03/05/17   1102   WBC  10.2  16.5*  14.0*   RBC  5.72  5.90  6.02   HEMOGLOBIN  16.0  16.6  16.9   HEMATOCRIT  46.5  47.8  49.5   MCV  81.3*  81.0*  82.2   MCH  28.0  28.1  28.1   MCHC  34.4  34.7  34.1   RDW  38.7  37.7  39.0   PLATELETCT  261  294  281   MPV  9.0  8.8*  9.3     Recent Labs      03/03/17   0251  03/04/17   0246  03/05/17   1102   SODIUM  136  132*  135   POTASSIUM  3.7  4.4  3.5*   CHLORIDE  104  101  104   CO2  26  25  24   GLUCOSE  125*  165*  125*   BUN  11  13  8     Recent Labs      03/03/17   0850   APTT  23.5*   INR  0.86*         Date 03/05/17 0700 - 03/06/17 0659   Shift 6581-3572 6435-2846 4310-0659 24 Hour Total   I  N  T  A  K  E   Shift Total       O  U  T  P  U  T   Stool          Number of Times Stooled 1 x   1 x    Shift Total       NET           Intake/Output Summary (Last 24 hours) at 03/05/17 1341  Last data filed at 03/04/17 1700   Gross per 24 hour   Intake      0 ml   Output    900 ml   Net   -900 ml         • vancomycin (VANCOCIN) IVPB (maintenance dose)  1,500 mg 1,500 mg (03/05/17 1246)   • oxycodone immediate-release  10 mg      Or   • oxycodone immediate-release  15 mg     • morphine injection  3 mg     • cefepime  2 g Stopped (03/05/17 0629)   • Pharmacy Consult Request  1 Each     • MD ALERT...Do not administer NSAIDS or ASPIRIN unless ORDERED By Neurosurgery  1 Each     • docusate sodium  100 mg     • senna-docusate  1 Tab     • senna-docusate  1 Tab     • polyethylene glycol/lytes  1 Packet     • magnesium hydroxide  30 mL     • bisacodyl  " 10 mg     • fleet  1 Each     • acetaminophen  1,000 mg     • diphenhydrAMINE  25 mg      Or   • diphenhydrAMINE  25 mg     • ondansetron  4 mg     • ondansetron  4 mg     • promethazine  12.5-25 mg     • promethazine  12.5-25 mg     • prochlorperazine  5-10 mg     • diazepam  5 mg      Or   • diazepam  5 mg     • albuterol  2 Puff     • gabapentin  300 mg     • tizanidine  2 mg     • NS  30 mL/kg     • Respiratory Care per Protocol       • NS  500 mL     • MD ALERT... vancomycin       • acetaminophen  500 mg           Assessment and Plan:  POD # 2 s/p I&D lumbar wound, durotomy repair  PT/OT - ambulate  AFO left foot  cx pending  ABX per ID  Pain control

## 2017-03-05 NOTE — PROGRESS NOTES
Custom order for AFO was placed to [facility]. For status on order contact [facility] directly at [phone]

## 2017-03-05 NOTE — PROGRESS NOTES
Patient complaining of 7/10 throbbing/aching pain in mid-lower back. Patient states he does not want pain medications at this time. Patient repositioned onto his right side and stated he felt more comfortable. Will continue to monitor

## 2017-03-05 NOTE — CARE PLAN
Problem: Pain Management  Goal: Pain level will decrease to patient’s comfort goal  Intervention: Educate and implement non-pharmacologic comfort measures. Examples: relaxation, distration, play therapy, activity therapy, massage, etc.  Patient complaining of 7/10 throbbing/aching pain in mid-lower back. Given oxy 10. Patient reassessed and states his pain level is 4/10. Will continue to monitor.

## 2017-03-05 NOTE — PROGRESS NOTES
Aaox4.pleasant.as of this time not complaining of headache just the back pain.flat on bed until noon tomorrow and verbalize understanding.able to void.patient able to sleep.continuing hourly rounding.

## 2017-03-05 NOTE — PROGRESS NOTES
Hospital Medicine Progress Note, Adult, Complex               Author: Baldomero Phillips Date & Time created: 3/5/2017  3:15 PM      ID/CC: 35 y/o M admitted for severe HA , worsening back pain possible lumbar infection of surgical site and sepsis.     Interval History:  Pt seen and examined, HA has much improved, no nausea or vomiting, afebrile,   Neurosurgery and ID following appreciate rec.     Review of Systems:  Review of Systems   Constitutional: Negative for fever and chills.   Respiratory: Negative for cough and shortness of breath.    Cardiovascular: Negative for chest pain and palpitations.   Gastrointestinal: Negative for nausea and vomiting.   Genitourinary: Negative for dysuria and urgency.   Musculoskeletal: Positive for neck pain (improved).   Neurological: Positive for headaches (has improved).   All other systems reviewed and are negative.      Physical Exam:  Physical Exam   Constitutional: He is oriented to person, place, and time.   HENT:   Head: Normocephalic and atraumatic.   Eyes: Conjunctivae are normal. No scleral icterus.   Neck: No JVD present.   Cardiovascular: Normal heart sounds.  Exam reveals no gallop.    No murmur heard.  Pulmonary/Chest: He has no wheezes. He has no rales.   Abdominal: Soft. Bowel sounds are normal. He exhibits no distension. There is no tenderness. There is no rebound.   Musculoskeletal: He exhibits no edema.   In the back there is midline vertical incision scar appears well  healed. no  secretion fluids appreciated  but there is erythema present.    Neurological: He is alert and oriented to person, place, and time.   Skin: Skin is warm. There is erythema.   Nursing note and vitals reviewed.      Labs:        Invalid input(s): [id number]      Recent Labs      03/03/17   0251  03/04/17   0246  03/05/17   1102   SODIUM  136  132*  135   POTASSIUM  3.7  4.4  3.5*   CHLORIDE  104  101  104   CO2  26  25  24   BUN  11  13  8   CREATININE  0.89  0.86  0.68   MAGNESIUM  2.3    --    --    CALCIUM  9.0  8.9  9.5     Recent Labs      17   0251  17   0246  17   1102   ALTSGPT   --    --   18   ASTSGOT   --    --   20   ALKPHOSPHAT   --    --   54   TBILIRUBIN   --    --   0.4   GLUCOSE  125*  165*  125*     Recent Labs      17   02517   0850  17   02417   1102   RBC  5.72   --   5.90  6.02   HEMOGLOBIN  16.0   --   16.6  16.9   HEMATOCRIT  46.5   --   47.8  49.5   PLATELETCT  261   --   294  281   PROTHROMBTM   --   12.0   --    --    APTT   --   23.5*   --    --    INR   --   0.86*   --    --      Recent Labs      17   02417   1102   WBC  10.2  16.5*  14.0*   NEUTSPOLYS  55.40  83.90*  62.40   LYMPHOCYTES  27.00  9.80*  24.40   MONOCYTES  10.50  5.20  9.20   EOSINOPHILS  5.90  0.20  2.40   BASOPHILS  0.30  0.40  0.40   ASTSGOT   --    --   20   ALTSGPT   --    --   18   ALKPHOSPHAT   --    --   54   TBILIRUBIN   --    --   0.4           Hemodynamics:  Temp (24hrs), Av.7 °C (98 °F), Min:36.3 °C (97.4 °F), Max:37.3 °C (99.2 °F)  Temperature: 36.2 °C (97.2 °F)  Pulse  Av.9  Min: 62  Max: 103   Blood Pressure: 116/72 mmHg     Respiratory:    Respiration: 16, Pulse Oximetry: 95 %        RUL Breath Sounds: Clear, RML Breath Sounds: Clear, RLL Breath Sounds: Clear, ALIDA Breath Sounds: Clear, LLL Breath Sounds: Clear  Fluids:    Intake/Output Summary (Last 24 hours) at 17 1515  Last data filed at 17 1700   Gross per 24 hour   Intake      0 ml   Output    900 ml   Net   -900 ml        GI/Nutrition:  Orders Placed This Encounter   Procedures   • DIET ORDER     Standing Status: Standing      Number of Occurrences: 1      Standing Expiration Date:      Order Specific Question:  Diet:     Answer:  Regular [1]     Medical Decision Making, by Problem:  Active Hospital Problems    Diagnosis   • *Sepsis (CMS-HCC) [A41.9]  - ?? Meningitis vs infected surgical site  -ID following , abx as per ID rec.   -wound  cx growing strep viridans  CSF culture NGTD   • Headache [R51]  -?? Meningitid  -ID consulted appreciate rec.  -abx as per ID   • Cellulitis [L03.90]  - there is erythema at surgical site   - on IV abx  -ID following    • Asthma [J45.909]  -stable  -cont on Rt protocol, inhalers    • Leukocytosis [D72.829]  - on IV abx monitor    L4-L5 CSF leak  -s/p surgical repair.     Labs reviewed, Medications reviewed and Radiology images reviewed              Antibiotics: Treating active infection/contamination beyond 24 hours perioperative coverage

## 2017-03-06 LAB
ANION GAP SERPL CALC-SCNC: 8 MMOL/L (ref 0–11.9)
BACTERIA CSF CULT: NORMAL
BUN SERPL-MCNC: 9 MG/DL (ref 8–22)
CALCIUM SERPL-MCNC: 9.4 MG/DL (ref 8.5–10.5)
CHLORIDE SERPL-SCNC: 103 MMOL/L (ref 96–112)
CO2 SERPL-SCNC: 24 MMOL/L (ref 20–33)
CREAT SERPL-MCNC: 0.65 MG/DL (ref 0.5–1.4)
ERYTHROCYTE [DISTWIDTH] IN BLOOD BY AUTOMATED COUNT: 38.8 FL (ref 35.9–50)
GFR SERPL CREATININE-BSD FRML MDRD: >60 ML/MIN/1.73 M 2
GLUCOSE SERPL-MCNC: 100 MG/DL (ref 65–99)
GRAM STN SPEC: NORMAL
HCT VFR BLD AUTO: 50.5 % (ref 42–52)
HGB BLD-MCNC: 17.2 G/DL (ref 14–18)
MCH RBC QN AUTO: 27.9 PG (ref 27–33)
MCHC RBC AUTO-ENTMCNC: 34.1 G/DL (ref 33.7–35.3)
MCV RBC AUTO: 81.8 FL (ref 81.4–97.8)
PLATELET # BLD AUTO: 275 K/UL (ref 164–446)
PMV BLD AUTO: 9.2 FL (ref 9–12.9)
POTASSIUM SERPL-SCNC: 3.8 MMOL/L (ref 3.6–5.5)
RBC # BLD AUTO: 6.17 M/UL (ref 4.7–6.1)
SIGNIFICANT IND 70042: NORMAL
SITE SITE: NORMAL
SODIUM SERPL-SCNC: 135 MMOL/L (ref 135–145)
SOURCE SOURCE: NORMAL
WBC # BLD AUTO: 14.4 K/UL (ref 4.8–10.8)

## 2017-03-06 PROCEDURE — A9270 NON-COVERED ITEM OR SERVICE: HCPCS | Performed by: INTERNAL MEDICINE

## 2017-03-06 PROCEDURE — 770006 HCHG ROOM/CARE - MED/SURG/GYN SEMI*

## 2017-03-06 PROCEDURE — 36415 COLL VENOUS BLD VENIPUNCTURE: CPT

## 2017-03-06 PROCEDURE — A9270 NON-COVERED ITEM OR SERVICE: HCPCS | Performed by: NEUROLOGICAL SURGERY

## 2017-03-06 PROCEDURE — 700111 HCHG RX REV CODE 636 W/ 250 OVERRIDE (IP)

## 2017-03-06 PROCEDURE — 700112 HCHG RX REV CODE 229: Performed by: NEUROLOGICAL SURGERY

## 2017-03-06 PROCEDURE — 700105 HCHG RX REV CODE 258: Performed by: INTERNAL MEDICINE

## 2017-03-06 PROCEDURE — 700111 HCHG RX REV CODE 636 W/ 250 OVERRIDE (IP): Performed by: NEUROLOGICAL SURGERY

## 2017-03-06 PROCEDURE — 99232 SBSQ HOSP IP/OBS MODERATE 35: CPT | Performed by: INTERNAL MEDICINE

## 2017-03-06 PROCEDURE — 700102 HCHG RX REV CODE 250 W/ 637 OVERRIDE(OP): Performed by: INTERNAL MEDICINE

## 2017-03-06 PROCEDURE — 700105 HCHG RX REV CODE 258

## 2017-03-06 PROCEDURE — 700111 HCHG RX REV CODE 636 W/ 250 OVERRIDE (IP): Performed by: INTERNAL MEDICINE

## 2017-03-06 PROCEDURE — 85027 COMPLETE CBC AUTOMATED: CPT

## 2017-03-06 PROCEDURE — 700102 HCHG RX REV CODE 250 W/ 637 OVERRIDE(OP): Performed by: NEUROLOGICAL SURGERY

## 2017-03-06 PROCEDURE — 80048 BASIC METABOLIC PNL TOTAL CA: CPT

## 2017-03-06 RX ADMIN — VANCOMYCIN HYDROCHLORIDE 1500 MG: 10 INJECTION, POWDER, LYOPHILIZED, FOR SOLUTION INTRAVENOUS at 12:08

## 2017-03-06 RX ADMIN — CEFEPIME 2 G: 2 INJECTION, POWDER, FOR SOLUTION INTRAVENOUS at 15:16

## 2017-03-06 RX ADMIN — ACETAMINOPHEN 1000 MG: 500 TABLET, FILM COATED ORAL at 05:01

## 2017-03-06 RX ADMIN — CEFEPIME 2 G: 2 INJECTION, POWDER, FOR SOLUTION INTRAVENOUS at 05:01

## 2017-03-06 RX ADMIN — GABAPENTIN 300 MG: 300 CAPSULE ORAL at 21:53

## 2017-03-06 RX ADMIN — VANCOMYCIN HYDROCHLORIDE 1500 MG: 10 INJECTION, POWDER, LYOPHILIZED, FOR SOLUTION INTRAVENOUS at 23:11

## 2017-03-06 RX ADMIN — ACETAMINOPHEN 1000 MG: 500 TABLET, FILM COATED ORAL at 18:31

## 2017-03-06 RX ADMIN — GABAPENTIN 300 MG: 300 CAPSULE ORAL at 05:01

## 2017-03-06 RX ADMIN — ACETAMINOPHEN 1000 MG: 500 TABLET, FILM COATED ORAL at 23:11

## 2017-03-06 RX ADMIN — GABAPENTIN 300 MG: 300 CAPSULE ORAL at 14:22

## 2017-03-06 RX ADMIN — DOCUSATE SODIUM 100 MG: 100 CAPSULE ORAL at 08:25

## 2017-03-06 RX ADMIN — DOCUSATE SODIUM 100 MG: 100 CAPSULE ORAL at 21:00

## 2017-03-06 RX ADMIN — CEFEPIME 2 G: 2 INJECTION, POWDER, FOR SOLUTION INTRAVENOUS at 21:45

## 2017-03-06 RX ADMIN — OXYCODONE HYDROCHLORIDE 10 MG: 10 TABLET ORAL at 07:39

## 2017-03-06 RX ADMIN — OXYCODONE HYDROCHLORIDE 10 MG: 10 TABLET ORAL at 21:50

## 2017-03-06 RX ADMIN — OXYCODONE HYDROCHLORIDE 10 MG: 10 TABLET ORAL at 12:07

## 2017-03-06 RX ADMIN — ACETAMINOPHEN 1000 MG: 500 TABLET, FILM COATED ORAL at 12:07

## 2017-03-06 RX ADMIN — VANCOMYCIN HYDROCHLORIDE 1500 MG: 10 INJECTION, POWDER, LYOPHILIZED, FOR SOLUTION INTRAVENOUS at 03:24

## 2017-03-06 RX ADMIN — Medication 1 TABLET: at 21:54

## 2017-03-06 RX ADMIN — DIPHENHYDRAMINE HYDROCHLORIDE 25 MG: 50 INJECTION, SOLUTION INTRAMUSCULAR; INTRAVENOUS at 14:23

## 2017-03-06 ASSESSMENT — PAIN SCALES - GENERAL
PAINLEVEL_OUTOF10: 7
PAINLEVEL_OUTOF10: 3
PAINLEVEL_OUTOF10: 3

## 2017-03-06 ASSESSMENT — ENCOUNTER SYMPTOMS
SHORTNESS OF BREATH: 0
SENSORY CHANGE: 1
DIARRHEA: 0
COUGH: 0
CHILLS: 0
NECK PAIN: 1
HEADACHES: 0
HEADACHES: 1
ABDOMINAL PAIN: 0
CONSTIPATION: 0
NAUSEA: 0
BACK PAIN: 1
PALPITATIONS: 0
FEVER: 0
VOMITING: 0

## 2017-03-06 NOTE — DOCUMENTATION QUERY
DOCUMENTATION QUERY    PROVIDERS: Please select “Cosign w/ note”to reply to query.    To better represent the severity of illness of your patient, please review the following information and exercise your independent professional judgment in responding to this query.     Sepsis, Meningitis and Postoperative Wound Infection are all documented in the History and Physical and Progress Notes. The operative note performed on 03/03/17 stated that there was a CSF build up underneath the lamina which was found in surgery and after which the CSF fluid culture was found negative for organisms that was sent in on 03/03/17 as well.     Based upon the clinical findings, risk factors, and treatment, can this diagnosis be further specified?    • Meningitis has resolved  • Meningitis has been ruled in  • Meningitis has been ruled out  • Other explanation of clinical findings  • Unable to determine        The medical record reflects the following:   Clinical Findings  Recent Lumbar surgery with CSF leak. CSF negative for organisms. WBC 11.8 Tachycardia and Tachypnea at admit     Treatment  Fluids, I&D of CSF build up location with Laminotomy & durotomy to close the length of the dural opening.    Risk Factors  Postop Wound infection with CSF leak, Severe HA and Neck pain   Location within medical record  H&P, Progress notes      Thank you for your time.   Sincerely,   Kellen Navarro RN  Clinical Documentation Improvement Specialist   [phone]  [email or web address]

## 2017-03-06 NOTE — PROGRESS NOTES
Patient alert and oriented. Incision dry with no drainage. IV intact. 1 assist with walker. Pain controlled with prn pain medications. Bed alarm on. Call light within reach. Bed locked and in low position. Patient encouraged to call with any needs/concerns.

## 2017-03-06 NOTE — PROGRESS NOTES
Hospital Medicine Progress Note, Adult, Complex               Author: Baldomero Phillips Date & Time created: 3/6/2017  3:41 PM      ID/CC: 37 y/o M admitted for severe HA , worsening back pain possible lumbar infection of surgical site and sepsis.     Interval History:  States HA has improved , no nausea or vomiting, afebrile,   Neurosurgery and ID following appreciate rec.     Review of Systems:  Review of Systems   Constitutional: Negative for fever and chills.   Respiratory: Negative for cough and shortness of breath.    Cardiovascular: Negative for chest pain and palpitations.   Gastrointestinal: Negative for nausea and vomiting.   Genitourinary: Negative for dysuria and urgency.   Musculoskeletal: Positive for neck pain (improved).   Neurological: Positive for headaches (has improved).   All other systems reviewed and are negative.      Physical Exam:  Physical Exam   Constitutional: He is oriented to person, place, and time.   HENT:   Head: Normocephalic and atraumatic.   Eyes: Conjunctivae are normal. No scleral icterus.   Neck: No JVD present.   Cardiovascular: Normal heart sounds.  Exam reveals no gallop.    No murmur heard.  Pulmonary/Chest: He has no wheezes. He has no rales.   Abdominal: Soft. Bowel sounds are normal. He exhibits no distension. There is no tenderness. There is no rebound.   Musculoskeletal: He exhibits no edema.   In the back there is midline vertical incision scar appears well  healed. no  secretion fluids appreciated  but there is erythema present.    Neurological: He is alert and oriented to person, place, and time.   Skin: Skin is warm. There is erythema.   Nursing note and vitals reviewed.      Labs:        Invalid input(s): [id number]      Recent Labs      03/04/17   0246  03/05/17   1102  03/06/17   0402   SODIUM  132*  135  135   POTASSIUM  4.4  3.5*  3.8   CHLORIDE  101  104  103   CO2  25  24  24   BUN  13  8  9   CREATININE  0.86  0.68  0.65   CALCIUM  8.9  9.5  9.4     Recent  Labs      17   0246  17   1102  17   0402   ALTSGPT   --   18   --    ASTSGOT   --   20   --    ALKPHOSPHAT   --   54   --    TBILIRUBIN   --   0.4   --    GLUCOSE  165*  125*  100*     Recent Labs      17   0246  17   1102  17   0402   RBC  5.90  6.02  6.17*   HEMOGLOBIN  16.6  16.9  17.2   HEMATOCRIT  47.8  49.5  50.5   PLATELETCT  294  281  275     Recent Labs      17   0246  17   1102  17   0402   WBC  16.5*  14.0*  14.4*   NEUTSPOLYS  83.90*  62.40   --    LYMPHOCYTES  9.80*  24.40   --    MONOCYTES  5.20  9.20   --    EOSINOPHILS  0.20  2.40   --    BASOPHILS  0.40  0.40   --    ASTSGOT   --   20   --    ALTSGPT   --   18   --    ALKPHOSPHAT   --   54   --    TBILIRUBIN   --   0.4   --            Hemodynamics:  Temp (24hrs), Av.3 °C (97.3 °F), Min:35.6 °C (96.1 °F), Max:36.9 °C (98.4 °F)  Temperature: (!) 35.6 °C (96.1 °F)  Pulse  Av.5  Min: 62  Max: 103   Blood Pressure: 107/70 mmHg     Respiratory:    Respiration: 18, Pulse Oximetry: 96 %        RUL Breath Sounds: Clear, RML Breath Sounds: Clear, RLL Breath Sounds: Clear, ALIDA Breath Sounds: Clear, LLL Breath Sounds: Clear  Fluids:  No intake or output data in the 24 hours ending 17 1541     GI/Nutrition:  Orders Placed This Encounter   Procedures   • DIET ORDER     Standing Status: Standing      Number of Occurrences: 1      Standing Expiration Date:      Order Specific Question:  Diet:     Answer:  Regular [1]     Medical Decision Making, by Problem:  Active Hospital Problems    Diagnosis   • *Sepsis (CMS-HCC) [A41.9]  - ?? Meningitis vs infected surgical site  -ID following , abx as per ID rec.   -wound cx growing strep viridans  CSF culture NGTD   • Headache [R51]  -?? Meningitid  -ID consulted appreciate rec.  -abx as per ID priscila need 6 weeks of IV abx    • Cellulitis [L03.90]  - there is erythema at surgical site   - on IV abx  -ID following    • Asthma [J45.909]  -stable  -cont on Rt  protocol, inhalers    • Leukocytosis [D72.829]  - on IV abx monitor    L4-L5 CSF leak  -s/p surgical repair.     Labs reviewed, Medications reviewed and Radiology images reviewed              Antibiotics: Treating active infection/contamination beyond 24 hours perioperative coverage

## 2017-03-06 NOTE — CARE PLAN
Problem: Bowel/Gastric:  Goal: Normal bowel function is maintained or improved  Intervention: Educate patient and significant other/support system about diet, fluid intake, medications and activity to promote bowel function  Patient had large soft-formed BM this AM. Colace given and patient educated about the option of prune juice if necessary

## 2017-03-06 NOTE — PROGRESS NOTES
"Infectious Disease Progress Note    Author: ADA Constantino    DOS & Time created: 3/6/2017  10:33 AM    Chief Complaint   Patient presents with   • Migraine   • Post-Op Complications     clear - pus near surgical site       Interval History:  36-year-old WM s/p 4  surgeries on his back.  The last surgery was on 2017 when he underwent  left L4-L5 lumbar microdiskectomy subsequently with copious drainage.  S/p I&D lumbar wound, durotomy repair on 3/3.    3/4 AF WBC 16.5 cxs polymicrobial. Denies SE abx. Pain controlled  3/5 AF WBC 14 still has a lot of back pain  3/6 AF, WBC 14.4.  Lower back pain, \"throbbing,\" 7/10, improves with pain medicine.  Labs Reviewed, Medications Reviewed, Radiology Reviewed and Wound Reviewed.    Review of Systems:  Review of Systems   Constitutional: Negative for fever and chills.   Respiratory: Negative for shortness of breath.    Cardiovascular: Negative for chest pain.   Gastrointestinal: Negative for nausea, vomiting, abdominal pain, diarrhea and constipation.   Musculoskeletal: Positive for back pain.        Using a cane when ambulating   Neurological: Positive for sensory change. Negative for headaches.        Burning/ tingling to Left leg and buttocks- improving       Hemodynamics:  Temp (24hrs), Av.3 °C (97.3 °F), Min:35.6 °C (96.1 °F), Max:36.9 °C (98.4 °F)  Temperature: (!) 35.6 °C (96.1 °F)  Pulse  Av.5  Min: 62  Max: 103   Blood Pressure: 107/70 mmHg       Physical Exam:  Physical Exam   Constitutional: He is oriented to person, place, and time. He appears well-developed and well-nourished. No distress.   HENT:   Head: Normocephalic and atraumatic.   Eyes: EOM are normal. Pupils are equal, round, and reactive to light. No scleral icterus.   Neck: Normal range of motion. Neck supple.   Cardiovascular: Normal rate, regular rhythm and intact distal pulses.    Pulmonary/Chest: Effort normal and breath sounds normal. No respiratory distress. He has no " wheezes.   Abdominal: Soft. Bowel sounds are normal. He exhibits no distension. There is no tenderness.   Musculoskeletal: He exhibits no edema.   Staples to lumbar spine, slightly tender to touch, no erythema, no drainage.   Neurological: He is alert and oriented to person, place, and time. Coordination normal.   Skin: Skin is warm and dry. No rash noted.   Nursing note and vitals reviewed.      Labs:  Recent Labs      03/04/17 0246 03/05/17   1102 03/06/17   0402   WBC  16.5*  14.0*  14.4*   RBC  5.90  6.02  6.17*   HEMOGLOBIN  16.6  16.9  17.2   HEMATOCRIT  47.8  49.5  50.5   MCV  81.0*  82.2  81.8   MCH  28.1  28.1  27.9   RDW  37.7  39.0  38.8   PLATELETCT  294  281  275   MPV  8.8*  9.3  9.2   NEUTSPOLYS  83.90*  62.40   --    LYMPHOCYTES  9.80*  24.40   --    MONOCYTES  5.20  9.20   --    EOSINOPHILS  0.20  2.40   --    BASOPHILS  0.40  0.40   --      Recent Labs      03/04/17 0246 03/05/17   1102  03/06/17   0402   SODIUM  132*  135  135   POTASSIUM  4.4  3.5*  3.8   CHLORIDE  101  104  103   CO2  25  24  24   GLUCOSE  165*  125*  100*   BUN  13  8  9     Recent Labs      03/04/17 0246 03/05/17   1102  03/06/17   0402   ALBUMIN   --   3.5   --    TBILIRUBIN   --   0.4   --    ALKPHOSPHAT   --   54   --    TOTPROTEIN   --   6.4   --    ALTSGPT   --   18   --    ASTSGOT   --   20   --    CREATININE  0.86  0.68  0.65        Imaging:  MR-LUMBAR SPINE-WITH & W/O (Final result) Result time: 03/03/17 07:39:03     Final result by Hamlet Ward M.D. (03/03/17 07:39:03)     Impression:     1.  Interval LEFT L5 hemilaminotomy with resolution of previously described disc extrusion.  2.  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  Abscess is felt unlikely.  3.  No epidural abscess identified.  4.  Central canal remains patent.  5.  RIGHT paracentral posterior disc  bulging again present at L5-S1.  6.  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   10 mg at 03/06/17 0739    Or   • oxycodone immediate-release (ROXICODONE) tablet 15 mg  15 mg Q4HRS PRN Baldomero Phillips M.D.   15 mg at 03/04/17 1547   • morphine (pf) 4 mg/ml injection 3 mg  3 mg Q4HRS PRN Baldomero Phillips M.D.   3 mg at 03/03/17 1333   • cefepime (MAXIPIME) 2 g in  mL IVPB  2 g Q8HRS Naina Gomez M.D.   Stopped at 03/06/17 0531   • Pharmacy Consult Request ...Pain Management Review 1 Each  1 Each PRN Christopher Calderon M.D.       • MD ALERT...Do not administer NSAIDS or ASPIRIN unless ORDERED By Neurosurgery 1 Each  1 Each PRN Christopher Calderon M.D.       • docusate sodium (COLACE) capsule 100 mg  100 mg BID Christopher Calderon M.D.   100 mg at 03/06/17 0825   • senna-docusate (PERICOLACE or SENOKOT S) 8.6-50 MG per tablet 1 Tab  1 Tab Nightly Christopher Calderon M.D.   1 Tab at 03/05/17 2049   • senna-docusate (PERICOLACE or SENOKOT S) 8.6-50 MG per tablet 1 Tab  1 Tab Q24HRS PRN Christopher Calderon M.D.       • polyethylene glycol/lytes (MIRALAX) PACKET 1 Packet  1 Packet BID PRN Christopher Calderon M.D.       • magnesium hydroxide (MILK OF MAGNESIA) suspension 30 mL  30 mL QDAY PRN Christopher Calderon M.D.       • bisacodyl (DULCOLAX) suppository 10 mg  10 mg Q24HRS PRN Christopher Calderon M.D.       • fleet enema 133 mL  1 Each Once PRN Christopher Calderon M.D.       • acetaminophen (TYLENOL) tablet 1,000 mg  1,000 mg Q6HRS Christopher Calderon M.D.   1,000 mg at 03/06/17 0501   • diphenhydrAMINE (BENADRYL) tablet/capsule 25 mg  25 mg Q6HRS PRN Christopher Calderon M.D.        Or   • diphenhydrAMINE (BENADRYL)  injection 25 mg  25 mg Q6HRS PRN Christopher Calderon M.D.       • ondansetron (ZOFRAN) syringe/vial injection 4 mg  4 mg Q4HRS PRN Christopher Calderon M.D.       • ondansetron (ZOFRAN ODT) dispertab 4 mg  4 mg Q4HRS PRN Christopher Calderon M.D.       • promethazine (PHENERGAN) tablet 12.5-25 mg  12.5-25 mg Q4HRS PRN Christopher Calderon M.D.       • promethazine (PHENERGAN) suppository 12.5-25 mg  12.5-25 mg Q4HRS PRN Christopher Calderon M.D.       • prochlorperazine (COMPAZINE) injection 5-10 mg  5-10 mg Q4HRS PRN Christopher Calderon M.D.       • diazepam (VALIUM) tablet 5 mg  5 mg Q6HRS PRN Christopher Calderon M.D.   5 mg at 03/04/17 1701    Or   • diazepam (VALIUM) injection 5 mg  5 mg Q6HRS PRN Christopher Calderon M.D.       • albuterol inhaler 2 Puff  2 Puff Q6HRS PRJENELLE Wilder M.D.       • gabapentin (NEURONTIN) capsule 300 mg  300 mg TID Caleb Wilder M.D.   300 mg at 03/06/17 0501   • tizanidine (ZANAFLEX) tablet 2 mg  2 mg TID PRN Caleb Wilder M.D.   2 mg at 03/03/17 0755   • NS infusion 2,994 mL  30 mL/kg Once PRN Caleb Wilder M.D.       • Respiratory Care per Protocol   Continuous RT Caleb Wilder M.D.       • NS (BOLUS) infusion 500 mL  500 mL Once PRN Caleb Wilder M.D.       • MD ALERT... vancomycin per pharmacy protocol   PRN Caleb Wilder M.D.       • acetaminophen (TYLENOL) tablet 500 mg  500 mg Q6HRS PRN Caleb Wilder M.D.         Last reviewed on 3/3/2017  2:53 PM by Darlene Tabares R.N.    Micro:  Results     Procedure Component Value Units Date/Time    CSF CULTURE [[id number]] Collected:  03/03/17 1522    Order Status:  Completed Specimen Information:  CSF Updated:  03/06/17 0829     Gram Stain Result --      Result:        Rare WBCs.  No organisms seen.       Significant Indicator NEG      Source CSF      Site Lumbar Spinal Fluid      CSF Culture No growth at 72 hours.     ANAEROBIC CULTURE [[id number]] Collected:  03/03/17 1522    Order Status:  Completed Specimen Information:  CSF Updated:   03/06/17 0829     Significant Indicator NEG      Source CSF      Site Lumbar Spinal Fluid      Anaerobic Culture, Culture Res Culture in progress.     CULTURE WOUND W/ GRAM STAIN [[id number]]  (Abnormal) Collected:  03/02/17 1140    Order Status:  Completed Specimen Information:  Wound Updated:  03/04/17 1025     Gram Stain Result No organisms seen.      Significant Indicator POS (POS)      Source WND      Site Lower Back      Culture Result Wound -- (A)      Result:        Growth noted after further incubation,see below for  organism identification.       Culture Result Wound -- (A)      Result:        Viridans Streptococcus  Isolated from enrichment broth only, please correlate with  clinical condition.       Culture Result Wound -- (A)      Result:        Coagulase-negative Staphylococcus species  Isolated from enrichment broth only, please correlate with  clinical condition.      Narrative:      Leaking fluid from Surgical incision    URINE CULTURE(NEW) [[id number]] Collected:  03/02/17 1510    Order Status:  Completed Specimen Information:  Urine Updated:  03/04/17 0723     Significant Indicator NEG      Source UR      Site --      Urine Culture No growth at 48 hours     Narrative:      Indication for culture:->Emergency Room Patient    GRAM STAIN [[id number]] Collected:  03/03/17 1522    Order Status:  Completed Specimen Information:  CSF Updated:  03/03/17 1838     Significant Indicator .      Source CSF      Site Lumbar Spinal Fluid      Gram Stain Result --      Result:        Rare WBCs.  No organisms seen.      CSF CULTURE [[id number]]     Order Status:  No result Specimen Information:  CSF from Tap     BLOOD CULTURE [[id number]] Collected:  03/02/17 1100    Order Status:  Completed Specimen Information:  Blood from Peripheral Updated:  03/03/17 0744     Significant Indicator NEG      Source BLD      Site PERIPHERAL      Blood Culture --      Result:        No Growth    Note: Blood cultures are incubated for 5  "days and  are monitored continuously.Positive blood cultures  are called to the RN and reported as soon as  they are identified.      Narrative:      Per Hospital Policy: Only change Specimen Src: to \"Line\" if  specified by physician order.    BLOOD CULTURE [[id number]] Collected:  03/02/17 1138    Order Status:  Completed Specimen Information:  Blood from Peripheral Updated:  03/03/17 0744     Significant Indicator NEG      Source BLD      Site PERIPHERAL      Blood Culture --      Result:        No Growth    Note: Blood cultures are incubated for 5 days and  are monitored continuously.Positive blood cultures  are called to the RN and reported as soon as  they are identified.      Narrative:      Per Hospital Policy: Only change Specimen Src: to \"Line\" if  specified by physician order.    GRAM STAIN [[id number]] Collected:  03/02/17 1140    Order Status:  Completed Specimen Information:  Wound Updated:  03/02/17 1608     Significant Indicator .      Source WND      Site Lower Back      Gram Stain Result No organisms seen.     Narrative:      Leaking fluid from Surgical incision    URINALYSIS [[id number]]  (Abnormal) Collected:  03/02/17 1510    Order Status:  Completed Specimen Information:  Urine Updated:  03/02/17 1545     Micro Urine Req Microscopic      Color Yellow      Character Hazy (A)      Specific Gravity 1.005      Ph >=9.0 (A)      Glucose Negative mg/dL      Ketones Negative mg/dL      Bilirubin Negative      Nitrite Negative      Leukocyte Esterase Negative      Occult Blood Negative     Narrative:      Indication for culture:->Emergency Room Patient    Blood Culture [[id number]]     Order Status:  Canceled Specimen Information:  Blood from Peripheral     Blood Culture [[id number]]     Order Status:  Canceled Specimen Information:  Blood from Peripheral     CSF CULTURE [[id number]] Collected:  03/02/17 0000    Order Status:  Canceled Specimen Information:  Other from Shunt     Narrative:      TEST CSF " Culture WAS CANCELLED, 03/02/2017 13:04  error.RN [place]  Leaking fluid from Surgical incision    Urinalysis [[id number]] Collected:  03/02/17 0000    Order Status:  Canceled Specimen Information:  Urine     Narrative:      TEST Urinalysis WAS CANCELLED, 03/02/2017 15:18 Duplicate .  03/02/2017  15:18  If not done within the last 24 hours  Send central line distal (brown) port venous blood gas    Culture Respiratory W/ GRM STN [[id number]] Collected:  03/02/17 0000    Order Status:  Canceled Specimen Information:  Sputum from Sputum     Narrative:      TEST Respiratory Culture w/ GS WAS CANCELLED, 03/06/17 01:05 Test  autocancelled, not collected or received  Sputum cultures, induced if needed. If not done within the  last 24 hours          Assessment:  Active Hospital Problems    Diagnosis   • *Sepsis (CMS-[facility]) [A41.9]   • Asthma [J45.909]   • Leukocytosis [D72.829]       Plan:  Postoperative CSF leak.  Suspect infected seroma   Afebrile  + leukocytosis, remains elevated.  S/p I&D lumbar wound, durotomy repair on 3/3 with Dr. Calderon   OR cultures, polymicrobial- Strep Viridans and Coag neg Staph.    Continue IV Cefepime and IV Vancomycin while awaiting final cxs.  Anticipate 6 weeks IV abx from surgery, planned end date 4/14/17.  PICC ordered  Monitor renal function closely while on vancomycin

## 2017-03-06 NOTE — PROGRESS NOTES
"Pharmacy Kinetics 36 y.o. male on vancomycin day # 5 3/6/2017    Currently Dose: Vancomycin 1500 mg iv q8hr  Received Load Dose: Yes (~25 mg/kg 3/2/17)    Indication for Treatment: meningitis vs surgical site infection rule-out  ID Service Following: Yes    Pertinent history per medical record: Admitted on 3/2/2017 for 3/2/17 for post-op wound evaluation. Patient s/p recent microdiskectomy (Dr Calderon) 17. Patient with history of multiple spinal surgery interventions. Per admission H&P severe HA, neck pain, and foot drop noted. ID service following. Given overlying infection no LP performed on admission (risk of bacteria translocation to CSF). Currently admitted to neurosciences unit.    Other antibiotics: cefepime 2 gm iv q8h    Allergies: Nkda     List concerns for accumulation of vancomycin: Hx spinal/surgical manipulation, electrolyte derangement, lactic acidosis    Pertinent cultures to date:   3/3/17 CSF culture x3 (NTD)  3/2/17 urine culture x1 (NTD)  3/2/17 lower back wound culture x1 (Viridans Streptococcus & Coagulase-negative Staphylococcus species)  3/2/17 lower back wound culture x1 (NEG)  3/2/17 blood culture x2 (NTD)    Recent Labs      17   0246  17   1102  17   0402   WBC  16.5*  14.0*  14.4*   NEUTSPOLYS  83.90*  62.40   --      Recent Labs      17   0246  17   1102  17   0402   BUN  13  8  9   CREATININE  0.86  0.68  0.65   ALBUMIN   --   3.5   --      Recent Labs      17   1137  17   1136   VANCOTROUGH  16.5  15.5   No intake or output data in the 24 hours ending 17 1018   Blood pressure 107/70, pulse 80, temperature 35.6 °C (96.1 °F), resp. rate 18, height 1.829 m (6' 0.01\"), weight 89.5 kg (197 lb 5 oz), SpO2 96 %. Temp (24hrs), Av.3 °C (97.3 °F), Min:35.6 °C (96.1 °F), Max:36.9 °C (98.4 °F)    Estimated Creatinine Clearance: 175.8 mL/min (by C-G formula based on Cr of 0.65).    A/P   1. Vancomycin dose change: not indicated  2. Next " vancomycin level: ~1-2 days  3. Goal trough: 18-22 mcg/mL  4. Comments: Hypotensive overnight. Afebrile overnight. WBC elevated and unmoved overnight. CrCl ~176 mL/min and stable. Microbiology pending. Most recent vancomycin trough below goal and drawn appropriately. Multiple factors for accumulation of vancomycin identified. Expect some accumulation given duration of therapy. Will order trough 3/6/17 given indication and identified factors. ID service following. Pharmacy will continue to follow and adjust as appropriate.    Ronni Siddiqui, PHARMD

## 2017-03-06 NOTE — PROGRESS NOTES
"Neurosurgery :   Sub/obj:  Mild HA    A&O x3, GCS 15  PERRL  C/o low back pain, tingling down left leg  Has been up already ambulating to BR.   No headache  LE str 5/5 except left TA 2/5  Inc c/d/i with staples   Voiding  Eating no n/v,         Blood pressure 107/70, pulse 80, temperature 35.6 °C (96.1 °F), resp. rate 18, height 1.829 m (6' 0.01\"), weight 89.5 kg (197 lb 5 oz), SpO2 96 %.    Recent Labs      03/04/17   0246  03/05/17   1102  03/06/17   0402   WBC  16.5*  14.0*  14.4*   RBC  5.90  6.02  6.17*   HEMOGLOBIN  16.6  16.9  17.2   HEMATOCRIT  47.8  49.5  50.5   MCV  81.0*  82.2  81.8   MCH  28.1  28.1  27.9   MCHC  34.7  34.1  34.1   RDW  37.7  39.0  38.8   PLATELETCT  294  281  275   MPV  8.8*  9.3  9.2     Recent Labs      03/04/17   0246  03/05/17   1102  03/06/17   0402   SODIUM  132*  135  135   POTASSIUM  4.4  3.5*  3.8   CHLORIDE  101  104  103   CO2  25  24  24   GLUCOSE  165*  125*  100*   BUN  13  8  9             Date 03/06/17 0700 - 03/07/17 0659   Shift [id number] 24 Hour Total   I  N  T  A  K  E   Shift Total       O  U  T  P  U  T   Stool          Number of Times Stooled 1 x   1 x    Shift Total       NET         No intake or output data in the 24 hours ending 03/06/17 1035      • vancomycin (VANCOCIN) IVPB (maintenance dose)  1,500 mg Stopped (03/06/17 9290)   • oxycodone immediate-release  10 mg      Or   • oxycodone immediate-release  15 mg     • morphine injection  3 mg     • cefepime  2 g Stopped (03/06/17 9778)   • Pharmacy Consult Request  1 Each     • MD ALERT...Do not administer NSAIDS or ASPIRIN unless ORDERED By Neurosurgery  1 Each     • docusate sodium  100 mg     • senna-docusate  1 Tab     • senna-docusate  1 Tab     • polyethylene glycol/lytes  1 Packet     • magnesium hydroxide  30 mL     • bisacodyl  10 mg     • fleet  1 Each     • acetaminophen  1,000 mg     • diphenhydrAMINE  25 mg      Or   • diphenhydrAMINE  25 mg     • ondansetron  4 mg     • " ondansetron  4 mg     • promethazine  12.5-25 mg     • promethazine  12.5-25 mg     • prochlorperazine  5-10 mg     • diazepam  5 mg      Or   • diazepam  5 mg     • albuterol  2 Puff     • gabapentin  300 mg     • tizanidine  2 mg     • NS  30 mL/kg     • Respiratory Care per Protocol       • NS  500 mL     • MD ALERT... vancomycin       • acetaminophen  500 mg           Assessment and Plan:  POD # 3 s/p I&D lumbar wound, durotomy repair  PT/OT - ambulate  AFO left foot  cx pending  ABX per ID  Pain control   Ok to dc when able

## 2017-03-06 NOTE — PROGRESS NOTES
Patient ambulated to the bathroom with a single-point cane and with standby assist. Complaining of 4/10 throbbing/aching pain in lower mid-back and left buttock area. Repositioned in bed with a pillow under left arm and back. Patient states he feels more comfortable in this position. Will continue to monitor

## 2017-03-06 NOTE — PROGRESS NOTES
Pt pointed out rash on trunk and complained of itchiness. PRN benadryl administered and MD notified. Pharmacy notified as well; pharmacist will decrease rate of vanco given as this is most likely related to vanco administration.

## 2017-03-07 ENCOUNTER — APPOINTMENT (OUTPATIENT)
Dept: RADIOLOGY | Facility: MEDICAL CENTER | Age: 37
DRG: 856 | End: 2017-03-07
Attending: NURSE PRACTITIONER
Payer: OTHER MISCELLANEOUS

## 2017-03-07 ENCOUNTER — APPOINTMENT (OUTPATIENT)
Dept: RADIOLOGY | Facility: MEDICAL CENTER | Age: 37
DRG: 856 | End: 2017-03-07
Attending: HOSPITALIST
Payer: OTHER MISCELLANEOUS

## 2017-03-07 LAB
ANION GAP SERPL CALC-SCNC: 9 MMOL/L (ref 0–11.9)
BACTERIA BLD CULT: NORMAL
BACTERIA BLD CULT: NORMAL
BUN SERPL-MCNC: 14 MG/DL (ref 8–22)
CALCIUM SERPL-MCNC: 9.3 MG/DL (ref 8.5–10.5)
CHLORIDE SERPL-SCNC: 104 MMOL/L (ref 96–112)
CO2 SERPL-SCNC: 26 MMOL/L (ref 20–33)
CREAT SERPL-MCNC: 0.69 MG/DL (ref 0.5–1.4)
ERYTHROCYTE [DISTWIDTH] IN BLOOD BY AUTOMATED COUNT: 38.2 FL (ref 35.9–50)
GFR SERPL CREATININE-BSD FRML MDRD: >60 ML/MIN/1.73 M 2
GLUCOSE SERPL-MCNC: 101 MG/DL (ref 65–99)
HCT VFR BLD AUTO: 51.5 % (ref 42–52)
HGB BLD-MCNC: 17.4 G/DL (ref 14–18)
MCH RBC QN AUTO: 27.6 PG (ref 27–33)
MCHC RBC AUTO-ENTMCNC: 33.8 G/DL (ref 33.7–35.3)
MCV RBC AUTO: 81.7 FL (ref 81.4–97.8)
PLATELET # BLD AUTO: 283 K/UL (ref 164–446)
PMV BLD AUTO: 9 FL (ref 9–12.9)
POTASSIUM SERPL-SCNC: 4 MMOL/L (ref 3.6–5.5)
RBC # BLD AUTO: 6.3 M/UL (ref 4.7–6.1)
SIGNIFICANT IND 70042: NORMAL
SIGNIFICANT IND 70042: NORMAL
SITE SITE: NORMAL
SITE SITE: NORMAL
SODIUM SERPL-SCNC: 139 MMOL/L (ref 135–145)
SOURCE SOURCE: NORMAL
SOURCE SOURCE: NORMAL
VANCOMYCIN TROUGH SERPL-MCNC: 15.2 UG/ML (ref 10–20)
WBC # BLD AUTO: 12.6 K/UL (ref 4.8–10.8)

## 2017-03-07 PROCEDURE — 700102 HCHG RX REV CODE 250 W/ 637 OVERRIDE(OP): Performed by: NEUROLOGICAL SURGERY

## 2017-03-07 PROCEDURE — 700105 HCHG RX REV CODE 258: Performed by: INTERNAL MEDICINE

## 2017-03-07 PROCEDURE — 700111 HCHG RX REV CODE 636 W/ 250 OVERRIDE (IP): Performed by: HOSPITALIST

## 2017-03-07 PROCEDURE — 700111 HCHG RX REV CODE 636 W/ 250 OVERRIDE (IP): Performed by: INTERNAL MEDICINE

## 2017-03-07 PROCEDURE — 85027 COMPLETE CBC AUTOMATED: CPT

## 2017-03-07 PROCEDURE — 700102 HCHG RX REV CODE 250 W/ 637 OVERRIDE(OP): Performed by: INTERNAL MEDICINE

## 2017-03-07 PROCEDURE — A9270 NON-COVERED ITEM OR SERVICE: HCPCS | Performed by: INTERNAL MEDICINE

## 2017-03-07 PROCEDURE — 80202 ASSAY OF VANCOMYCIN: CPT

## 2017-03-07 PROCEDURE — A9270 NON-COVERED ITEM OR SERVICE: HCPCS | Performed by: NEUROLOGICAL SURGERY

## 2017-03-07 PROCEDURE — 700112 HCHG RX REV CODE 229: Performed by: NEUROLOGICAL SURGERY

## 2017-03-07 PROCEDURE — 36415 COLL VENOUS BLD VENIPUNCTURE: CPT

## 2017-03-07 PROCEDURE — 770006 HCHG ROOM/CARE - MED/SURG/GYN SEMI*

## 2017-03-07 PROCEDURE — 80048 BASIC METABOLIC PNL TOTAL CA: CPT

## 2017-03-07 PROCEDURE — 99232 SBSQ HOSP IP/OBS MODERATE 35: CPT | Performed by: HOSPITALIST

## 2017-03-07 PROCEDURE — C1751 CATH, INF, PER/CENT/MIDLINE: HCPCS

## 2017-03-07 PROCEDURE — 36569 INSJ PICC 5 YR+ W/O IMAGING: CPT

## 2017-03-07 PROCEDURE — 700105 HCHG RX REV CODE 258: Performed by: HOSPITALIST

## 2017-03-07 RX ADMIN — GABAPENTIN 300 MG: 300 CAPSULE ORAL at 22:18

## 2017-03-07 RX ADMIN — ACETAMINOPHEN 1000 MG: 500 TABLET, FILM COATED ORAL at 06:16

## 2017-03-07 RX ADMIN — ACETAMINOPHEN 1000 MG: 500 TABLET, FILM COATED ORAL at 11:57

## 2017-03-07 RX ADMIN — ACETAMINOPHEN 1000 MG: 500 TABLET, FILM COATED ORAL at 23:18

## 2017-03-07 RX ADMIN — CEFEPIME 2 G: 2 INJECTION, POWDER, FOR SOLUTION INTRAVENOUS at 14:33

## 2017-03-07 RX ADMIN — DIPHENHYDRAMINE HCL 25 MG: 25 TABLET ORAL at 22:18

## 2017-03-07 RX ADMIN — ACETAMINOPHEN 1000 MG: 500 TABLET, FILM COATED ORAL at 18:35

## 2017-03-07 RX ADMIN — VANCOMYCIN HYDROCHLORIDE 1700 MG: 10 INJECTION, POWDER, LYOPHILIZED, FOR SOLUTION INTRAVENOUS at 22:18

## 2017-03-07 RX ADMIN — DOCUSATE SODIUM 100 MG: 100 CAPSULE ORAL at 08:50

## 2017-03-07 RX ADMIN — CEFEPIME 2 G: 2 INJECTION, POWDER, FOR SOLUTION INTRAVENOUS at 06:58

## 2017-03-07 RX ADMIN — GABAPENTIN 300 MG: 300 CAPSULE ORAL at 14:49

## 2017-03-07 RX ADMIN — DOCUSATE SODIUM 100 MG: 100 CAPSULE ORAL at 22:18

## 2017-03-07 RX ADMIN — OXYCODONE HYDROCHLORIDE 15 MG: 5 TABLET ORAL at 19:57

## 2017-03-07 RX ADMIN — Medication 1 TABLET: at 22:18

## 2017-03-07 RX ADMIN — OXYCODONE HYDROCHLORIDE 15 MG: 5 TABLET ORAL at 11:57

## 2017-03-07 RX ADMIN — OXYCODONE HYDROCHLORIDE 15 MG: 5 TABLET ORAL at 06:31

## 2017-03-07 RX ADMIN — GABAPENTIN 300 MG: 300 CAPSULE ORAL at 06:16

## 2017-03-07 RX ADMIN — VANCOMYCIN HYDROCHLORIDE 1500 MG: 10 INJECTION, POWDER, LYOPHILIZED, FOR SOLUTION INTRAVENOUS at 11:58

## 2017-03-07 RX ADMIN — VANCOMYCIN HYDROCHLORIDE 1500 MG: 10 INJECTION, POWDER, LYOPHILIZED, FOR SOLUTION INTRAVENOUS at 04:57

## 2017-03-07 ASSESSMENT — ENCOUNTER SYMPTOMS
BLOOD IN STOOL: 0
SPEECH CHANGE: 0
PALPITATIONS: 0
ABDOMINAL PAIN: 0
HEMOPTYSIS: 0
BACK PAIN: 1
LOSS OF CONSCIOUSNESS: 0
VOMITING: 0
CHILLS: 0
SHORTNESS OF BREATH: 0
COUGH: 0
DIARRHEA: 0
HEADACHES: 0
NAUSEA: 0
SENSORY CHANGE: 1
PSYCHIATRIC NEGATIVE: 1
ORTHOPNEA: 0
CONSTIPATION: 0
NECK PAIN: 1
EYES NEGATIVE: 1
FEVER: 0
SEIZURES: 0

## 2017-03-07 ASSESSMENT — PAIN SCALES - GENERAL
PAINLEVEL_OUTOF10: 3
PAINLEVEL_OUTOF10: 7
PAINLEVEL_OUTOF10: 3
PAINLEVEL_OUTOF10: 8
PAINLEVEL_OUTOF10: 4
PAINLEVEL_OUTOF10: 4

## 2017-03-07 NOTE — PROGRESS NOTES
"Infectious Disease Progress Note    Author: Kellen Amato M.D.    DOS & Time created: 3/7/2017  3:08 PM    Chief Complaint   Patient presents with   • Migraine   • Post-Op Complications     clear - pus near surgical site       Interval History:  36-year-old WM s/p 4  surgeries on his back.  The last surgery was on 2017 when he underwent  left L4-L5 lumbar microdiskectomy subsequently with copious drainage.  S/p I&D lumbar wound, durotomy repair on 3/3.    3/4 AF WBC 16.5 cxs polymicrobial. Denies SE abx. Pain controlled  3/5 AF WBC 14 still has a lot of back pain  3/6 AF, WBC 14.4.  Lower back pain, \"throbbing,\" 7/10, improves with pain medicine.  3/7 AF WBC 12.6 to get PICC line this am-denies SE abx  Labs Reviewed, Medications Reviewed, Radiology Reviewed and Wound Reviewed.    Review of Systems:  Review of Systems   Constitutional: Negative for fever and chills.   Respiratory: Negative for shortness of breath.    Cardiovascular: Negative for chest pain.   Gastrointestinal: Negative for nausea, vomiting, abdominal pain, diarrhea and constipation.   Musculoskeletal: Positive for back pain.        Using a cane when ambulating   Neurological: Positive for sensory change. Negative for headaches.        Burning/ tingling to Left leg and buttocks- improving       Hemodynamics:  Temp (24hrs), Av.3 °C (97.3 °F), Min:35.9 °C (96.6 °F), Max:36.6 °C (97.8 °F)  Temperature: 36.6 °C (97.8 °F)  Pulse  Av.2  Min: 62  Max: 103   Blood Pressure: 113/69 mmHg       Physical Exam:  Physical Exam   Constitutional: He is oriented to person, place, and time. He appears well-developed and well-nourished. No distress.   HENT:   Head: Normocephalic and atraumatic.   Eyes: EOM are normal. Pupils are equal, round, and reactive to light. No scleral icterus.   Neck: Normal range of motion. Neck supple.   Cardiovascular: Normal rate, regular rhythm and intact distal pulses.    Pulmonary/Chest: Effort normal and breath " sounds normal. No respiratory distress. He has no wheezes.   Abdominal: Soft. Bowel sounds are normal. He exhibits no distension. There is no tenderness.   Musculoskeletal: He exhibits no edema.   Staples to lumbar spine, slightly tender to touch, no erythema, no drainage.   Neurological: He is alert and oriented to person, place, and time. Coordination normal.   Skin: Skin is warm and dry. No rash noted.   Nursing note and vitals reviewed.      Labs:  Recent Labs      03/05/17   1102  03/06/17   0402  03/07/17   0326   WBC  14.0*  14.4*  12.6*   RBC  6.02  6.17*  6.30*   HEMOGLOBIN  16.9  17.2  17.4   HEMATOCRIT  49.5  50.5  51.5   MCV  82.2  81.8  81.7   MCH  28.1  27.9  27.6   RDW  39.0  38.8  38.2   PLATELETCT  281  275  283   MPV  9.3  9.2  9.0   NEUTSPOLYS  62.40   --    --    LYMPHOCYTES  24.40   --    --    MONOCYTES  9.20   --    --    EOSINOPHILS  2.40   --    --    BASOPHILS  0.40   --    --      Recent Labs      03/05/17   1102  03/06/17   0402  03/07/17   0326   SODIUM  135  135  139   POTASSIUM  3.5*  3.8  4.0   CHLORIDE  104  103  104   CO2  24  24  26   GLUCOSE  125*  100*  101*   BUN  8  9  14     Recent Labs      03/05/17   1102  03/06/17   0402  03/07/17   0326   ALBUMIN  3.5   --    --    TBILIRUBIN  0.4   --    --    ALKPHOSPHAT  54   --    --    TOTPROTEIN  6.4   --    --    ALTSGPT  18   --    --    ASTSGOT  20   --    --    CREATININE  0.68  0.65  0.69        Imaging:  MR-LUMBAR SPINE-WITH & W/O (Final result) Result time: 03/03/17 07:39:03     Final result by Hamlet Ward M.D. (03/03/17 07:39:03)     Impression:     1.  Interval LEFT L5 hemilaminotomy with resolution of previously described disc extrusion.  2.  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  Abscess is felt unlikely.  3.  No epidural abscess identified.  4.  Central canal remains  patent.  5.  RIGHT paracentral posterior disc bulging again present at L5-S1.  6.  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       • vancomycin 1,700 mg in  mL IVPB  19 mg/kg Q8HR Andrew Montanez M.D.       • oxycodone immediate release (ROXICODONE) tablet 10 mg  10 mg Q4HRS PRN Baldomero Phillips M.D.   10 mg at 03/06/17 2150    Or   • oxycodone immediate-release (ROXICODONE) tablet 15 mg  15 mg Q4HRS PRN Baldomero Phillips M.D.   15 mg at 03/07/17 1157   • morphine (pf) 4 mg/ml injection 3 mg  3 mg Q4HRS PRN Baldomero Phillips M.D.   3 mg at 03/03/17 1333   • cefepime (MAXIPIME) 2 g in  mL IVPB  2 g Q8HRS Naina Gomez M.D.   Stopped at 03/07/17 1503   • Pharmacy Consult Request ...Pain Management Review 1 Each  1 Each PRN Christopher Calderon M.D.       • MD ALERT...Do not administer NSAIDS or ASPIRIN unless ORDERED By Neurosurgery 1 Each  1 Each PRN Christopher Calderon M.D.       • docusate sodium (COLACE) capsule 100 mg  100 mg BID Christopher Calderon M.D.   100 mg at 03/07/17 0850   • senna-docusate (PERICOLACE or SENOKOT S) 8.6-50 MG per tablet 1 Tab  1 Tab Nightly Christopher Calderon M.D.   1 Tab at 03/06/17 2154   • senna-docusate (PERICOLACE or SENOKOT S) 8.6-50 MG per tablet 1 Tab  1 Tab Q24HRS PRN Christopher Calderon M.D.       • polyethylene glycol/lytes (MIRALAX) PACKET 1 Packet  1 Packet BID PRN Christopher Calderon M.D.       • magnesium hydroxide (MILK OF MAGNESIA) suspension 30 mL  30 mL QDAY PRN Christopher Calderon M.D.       • bisacodyl (DULCOLAX) suppository 10 mg  10 mg Q24HRS PRN Christopher Calderon M.D.       • fleet enema 133 mL  1 Each Once PRN Christopher Calderon M.D.       • acetaminophen (TYLENOL) tablet 1,000 mg  1,000 mg Q6HRS Christopher Calderon M.D.   1,000 mg at 03/07/17 1157   • diphenhydrAMINE (BENADRYL)  tablet/capsule 25 mg  25 mg Q6HRS PRN Christopher Calderon M.D.        Or   • diphenhydrAMINE (BENADRYL) injection 25 mg  25 mg Q6HRS PRJENLELE Calderon M.D.   25 mg at 03/06/17 1423   • ondansetron (ZOFRAN) syringe/vial injection 4 mg  4 mg Q4HRS PRN Christopher Calderon M.D.       • ondansetron (ZOFRAN ODT) dispertab 4 mg  4 mg Q4HRS PRN Christopher Calderon M.D.       • promethazine (PHENERGAN) tablet 12.5-25 mg  12.5-25 mg Q4HRS PRN Christopher Calderon M.D.       • promethazine (PHENERGAN) suppository 12.5-25 mg  12.5-25 mg Q4HRS PRN Christopher Calderon M.D.       • prochlorperazine (COMPAZINE) injection 5-10 mg  5-10 mg Q4HRS PRN Christopher Calderon M.D.       • diazepam (VALIUM) tablet 5 mg  5 mg Q6HRS PRN Christopher Calderon M.D.   5 mg at 03/04/17 1701    Or   • diazepam (VALIUM) injection 5 mg  5 mg Q6HRS PRJENELLE Calderon M.D.       • albuterol inhaler 2 Puff  2 Puff Q6HRS PRJENELLE Wilder M.D.       • gabapentin (NEURONTIN) capsule 300 mg  300 mg TID Caleb Wilder M.D.   300 mg at 03/07/17 1449   • tizanidine (ZANAFLEX) tablet 2 mg  2 mg TID PRN Caleb Wilder M.D.   2 mg at 03/03/17 0755   • NS infusion 2,994 mL  30 mL/kg Once PRN Caleb Wilder M.D.       • Respiratory Care per Protocol   Continuous RT Caleb Wilder M.D.       • NS (BOLUS) infusion 500 mL  500 mL Once PRN Caleb Wilder M.D.       • MD ALERT... vancomycin per pharmacy protocol   PRN Caleb Butlert, M.D.       • acetaminophen (TYLENOL) tablet 500 mg  500 mg Q6HRS ANNY Wilder M.D.         Last reviewed on 3/3/2017  2:53 PM by Darlene Tabares R.N.    Micro:  Results     Procedure Component Value Units Date/Time    BLOOD CULTURE [[id number]] Collected:  03/02/17 1100    Order Status:  Completed Specimen Information:  Blood from Peripheral Updated:  03/07/17 1300     Significant Indicator NEG      Source BLD      Site PERIPHERAL      Blood Culture No growth after 5 days of incubation.     Narrative:      Per Hospital Policy: Only change  "Specimen Src: to \"Line\" if  specified by physician order.    BLOOD CULTURE [[id number]] Collected:  03/02/17 1138    Order Status:  Completed Specimen Information:  Blood from Peripheral Updated:  03/07/17 1300     Significant Indicator NEG      Source BLD      Site PERIPHERAL      Blood Culture No growth after 5 days of incubation.     Narrative:      Per Hospital Policy: Only change Specimen Src: to \"Line\" if  specified by physician order.    CSF CULTURE [[id number]] Collected:  03/03/17 1522    Order Status:  Completed Specimen Information:  CSF Updated:  03/06/17 0829     Gram Stain Result --      Result:        Rare WBCs.  No organisms seen.       Significant Indicator NEG      Source CSF      Site Lumbar Spinal Fluid      CSF Culture No growth at 72 hours.     ANAEROBIC CULTURE [[id number]] Collected:  03/03/17 1522    Order Status:  Completed Specimen Information:  CSF Updated:  03/06/17 0829     Significant Indicator NEG      Source CSF      Site Lumbar Spinal Fluid      Anaerobic Culture, Culture Res Culture in progress.     CULTURE WOUND W/ GRAM STAIN [[id number]]  (Abnormal) Collected:  03/02/17 1140    Order Status:  Completed Specimen Information:  Wound Updated:  03/04/17 1025     Gram Stain Result No organisms seen.      Significant Indicator POS (POS)      Source WND      Site Lower Back      Culture Result Wound -- (A)      Result:        Growth noted after further incubation,see below for  organism identification.       Culture Result Wound -- (A)      Result:        Viridans Streptococcus  Isolated from enrichment broth only, please correlate with  clinical condition.       Culture Result Wound -- (A)      Result:        Coagulase-negative Staphylococcus species  Isolated from enrichment broth only, please correlate with  clinical condition.      Narrative:      Leaking fluid from Surgical incision    URINE CULTURE(NEW) [[id number]] Collected:  03/02/17 1510    Order Status:  Completed Specimen " Information:  Urine Updated:  03/04/17 0723     Significant Indicator NEG      Source UR      Site --      Urine Culture No growth at 48 hours     Narrative:      Indication for culture:->Emergency Room Patient    GRAM STAIN [[id number]] Collected:  03/03/17 1522    Order Status:  Completed Specimen Information:  CSF Updated:  03/03/17 1838     Significant Indicator .      Source CSF      Site Lumbar Spinal Fluid      Gram Stain Result --      Result:        Rare WBCs.  No organisms seen.      CSF CULTURE [[id number]]     Order Status:  No result Specimen Information:  CSF from Tap     GRAM STAIN [[id number]] Collected:  03/02/17 1140    Order Status:  Completed Specimen Information:  Wound Updated:  03/02/17 1608     Significant Indicator .      Source WND      Site Lower Back      Gram Stain Result No organisms seen.     Narrative:      Leaking fluid from Surgical incision    URINALYSIS [[id number]]  (Abnormal) Collected:  03/02/17 1510    Order Status:  Completed Specimen Information:  Urine Updated:  03/02/17 1545     Micro Urine Req Microscopic      Color Yellow      Character Hazy (A)      Specific Gravity 1.005      Ph >=9.0 (A)      Glucose Negative mg/dL      Ketones Negative mg/dL      Bilirubin Negative      Nitrite Negative      Leukocyte Esterase Negative      Occult Blood Negative     Narrative:      Indication for culture:->Emergency Room Patient    Blood Culture [[id number]]     Order Status:  Canceled Specimen Information:  Blood from Peripheral     Blood Culture [[id number]]     Order Status:  Canceled Specimen Information:  Blood from Peripheral     CSF CULTURE [[id number]] Collected:  03/02/17 0000    Order Status:  Canceled Specimen Information:  Other from Shunt     Narrative:      TEST CSF Culture WAS CANCELLED, 03/02/2017 13:04  error.[email or web address] Avila  Leaking fluid from Surgical incision    Urinalysis [[id number]] Collected:  03/02/17 0000    Order Status:  Canceled Specimen Information:  Urine      Narrative:      TEST Urinalysis WAS CANCELLED, 03/02/2017 15:18 Duplicate .  03/02/2017  15:18  If not done within the last 24 hours  Send central line distal (brown) port venous blood gas    Culture Respiratory W/ GRM STN [[id number]] Collected:  03/02/17 0000    Order Status:  Canceled Specimen Information:  Sputum from Sputum     Narrative:      TEST Respiratory Culture w/ GS WAS CANCELLED, 03/06/17 01:05 Test  autocancelled, not collected or received  Sputum cultures, induced if needed. If not done within the  last 24 hours          Assessment:  Active Hospital Problems    Diagnosis   • *Sepsis (CMS-HCC) [A41.9]   • Asthma [J45.909]   • Leukocytosis [D72.829]       Plan:  Postoperative CSF leak.  Infected seroma   Afebrile  S/p I&D lumbar wound, durotomy repair on 3/3 with Dr. Calderon   OR cultures, polymicrobial- Strep Viridans and Coag neg Staph.    DC IV Cefepime   Continue IV Vancomycin while in hosp  Monitor renal function closely while on vancomycin  Will switch to daptomycin for once daily infusion at discharge  Anticipate 6 weeks IV abx from surgery, planned end date 4/14/17.  PICC done-orders given to CM    Leukocytosis, persistent  Decreased  Continue to monitor to resolution    DW IM

## 2017-03-07 NOTE — PROGRESS NOTES
Assumed care of patient. Pain is well controlled. Patient resting comfortably. VSS with no signs of acute distress. Tolerates PO intake well. Patient informed of plan of care, questions answered. Call light in reach. Hourly rounding continued.

## 2017-03-07 NOTE — CARE PLAN
Problem: Safety  Goal: Will remain free from injury  Outcome: PROGRESSING AS EXPECTED  Safety maintained. Call light in reach.    Problem: Pain Management  Goal: Pain level will decrease to patient’s comfort goal  Outcome: PROGRESSING AS EXPECTED    03/06/17 2300   OTHER   Nurse Pain Scale 0 - 10  3   Non Verbal Scale  Calm   Comfort Goal Comfort at Rest;Comfort with Movement

## 2017-03-07 NOTE — DISCHARGE PLANNING
SW received IV abx order from ID. SW called pt's Worker's comp ([phone]) requesting call back to see what home health and infusion agencies we should refer to. Per RN, pt does not have transportation for outpatient infusion. SW awaiting return call.

## 2017-03-07 NOTE — PROGRESS NOTES
"Pharmacy Kinetics 36 y.o. male on vancomycin day # 6 3/7/2017    Currently Dose: Vancomycin 1500 mg iv q8hr  Received Load Dose: Yes     Indication for Treatment: meningitis vs surgical site infection rule-out  ID Service Following: Yes    Pertinent history per medical record: Admitted on 3/2/2017 for 3/2/17 for post-op wound evaluation. Patient s/p recent microdiskectomy (Dr Calderon) 17. Patient with history of multiple spinal surgery interventions. Per admission H&P severe HA, neck pain, and foot drop noted. ID service following. Given overlying infection no LP performed on admission (risk of bacteria translocation to CSF) but performed later during hospital stay. Currently admitted to neurosciences unit.    Other antibiotics: cefepime 2 gm iv q8h    Allergies: Nkda     List concerns for accumulation of vancomycin: Hx spinal/surgical manipulation, electrolyte derangement, lactic acidosis    Pertinent cultures to date:   3/3/17 CSF culture x3 (NEG)  3/2/17 urine culture x1 (NEG)  3/2/17 lower back wound culture x1 (Viridans Streptococcus & Coagulase-negative Staphylococcus species)  3/2/17 lower back wound culture x1 (NEG)  3/2/17 blood culture x2 (NEG)    Recent Labs      17   1102  17   0402  17   0326   WBC  14.0*  14.4*  12.6*   NEUTSPOLYS  62.40   --    --      Recent Labs      17   1102  17   0402  17   0326   BUN  8  9  14   CREATININE  0.68  0.65  0.69   ALBUMIN  3.5   --    --      Recent Labs      17   1140   VANCOTROUGH  15.2     Blood pressure 113/69, pulse 85, temperature 36.6 °C (97.8 °F), resp. rate 17, height 1.829 m (6' 0.01\"), weight 89.5 kg (197 lb 5 oz), SpO2 95 %. Temp (24hrs), Av.3 °C (97.3 °F), Min:35.9 °C (96.6 °F), Max:36.6 °C (97.8 °F)    Estimated Creatinine Clearance: 165.6 mL/min (by C-G formula based on Cr of 0.69).    A/P   1. Vancomycin dose change: 1700 mg iv q8h (~17 mg/kg)   2. Next vancomycin level: ~2-3 days  3. Goal trough: " "18-22 mcg/mL  4. Comments: Hypotension noted overnight. Afebrile overnight. WBC down overnight but with continued elevation. Microbiology unchanged. ID service following. Multiple factors for accumulation of vancomycin identified. Most recent trough below goal and drawn appropriately. Given indication will modestly adjust dose to target goal trough given current daily dose total (~4.5 gm). 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 Pharmacy will continue to follow and adjust as appropriate.    Ronni Siddiqui, PHARMD            "

## 2017-03-07 NOTE — PROGRESS NOTES
PICC Insertion Procedure Note    Consents confirmed, vessel patency confirmed with ultrasound. Risks and benefits of procedure explained to patient/family and education regarding central line associated bloodstream infections provided. Questions answered.     PICC placed in RUE per MD order with ultrasound guidance. 4 Fr, single lumen PICC placed in bascilic vein after 1 attempt(s). 2 cc's of 1% lidocaine injected intradermally, 21 gauge microintroducer needle and modified Seldinger technique used. 43.5 cm total catheter length, 0 cm external measurement inserted with good blood return. Each lumen flushed without resistance with 10 mL 0.9% normal saline. PICC line secured with Biopatch and Tegaderm.    PICC tip location in the SVC confirmed by ECG technology. Pt tolerated procedure well.  Patient condition relayed to unit RN or ordering physician via this post procedure note in the EMR.     Sample6 Power PICC ref # [id number], Lot # [id number]

## 2017-03-08 PROBLEM — T81.49XA WOUND INFECTION AFTER SURGERY: Status: ACTIVE | Noted: 2017-03-08

## 2017-03-08 LAB
BACTERIA SPEC ANAEROBE CULT: NORMAL
SIGNIFICANT IND 70042: NORMAL
SITE SITE: NORMAL
SOURCE SOURCE: NORMAL

## 2017-03-08 PROCEDURE — 700102 HCHG RX REV CODE 250 W/ 637 OVERRIDE(OP): Performed by: INTERNAL MEDICINE

## 2017-03-08 PROCEDURE — 700102 HCHG RX REV CODE 250 W/ 637 OVERRIDE(OP): Performed by: NEUROLOGICAL SURGERY

## 2017-03-08 PROCEDURE — 770006 HCHG ROOM/CARE - MED/SURG/GYN SEMI*

## 2017-03-08 PROCEDURE — 700111 HCHG RX REV CODE 636 W/ 250 OVERRIDE (IP): Performed by: NEUROLOGICAL SURGERY

## 2017-03-08 PROCEDURE — 700105 HCHG RX REV CODE 258: Performed by: HOSPITALIST

## 2017-03-08 PROCEDURE — A9270 NON-COVERED ITEM OR SERVICE: HCPCS | Performed by: INTERNAL MEDICINE

## 2017-03-08 PROCEDURE — A9270 NON-COVERED ITEM OR SERVICE: HCPCS | Performed by: NEUROLOGICAL SURGERY

## 2017-03-08 PROCEDURE — 700111 HCHG RX REV CODE 636 W/ 250 OVERRIDE (IP): Performed by: HOSPITALIST

## 2017-03-08 PROCEDURE — 99232 SBSQ HOSP IP/OBS MODERATE 35: CPT | Performed by: HOSPITALIST

## 2017-03-08 RX ADMIN — ACETAMINOPHEN 1000 MG: 500 TABLET, FILM COATED ORAL at 06:18

## 2017-03-08 RX ADMIN — OXYCODONE HYDROCHLORIDE 15 MG: 5 TABLET ORAL at 15:03

## 2017-03-08 RX ADMIN — GABAPENTIN 300 MG: 300 CAPSULE ORAL at 20:32

## 2017-03-08 RX ADMIN — ACETAMINOPHEN 1000 MG: 500 TABLET, FILM COATED ORAL at 12:24

## 2017-03-08 RX ADMIN — OXYCODONE HYDROCHLORIDE 15 MG: 5 TABLET ORAL at 20:34

## 2017-03-08 RX ADMIN — VANCOMYCIN HYDROCHLORIDE 1700 MG: 10 INJECTION, POWDER, LYOPHILIZED, FOR SOLUTION INTRAVENOUS at 20:30

## 2017-03-08 RX ADMIN — GABAPENTIN 300 MG: 300 CAPSULE ORAL at 06:18

## 2017-03-08 RX ADMIN — OXYCODONE HYDROCHLORIDE 15 MG: 5 TABLET ORAL at 09:03

## 2017-03-08 RX ADMIN — GABAPENTIN 300 MG: 300 CAPSULE ORAL at 15:03

## 2017-03-08 RX ADMIN — OXYCODONE HYDROCHLORIDE 10 MG: 10 TABLET ORAL at 00:15

## 2017-03-08 RX ADMIN — VANCOMYCIN HYDROCHLORIDE 1700 MG: 10 INJECTION, POWDER, LYOPHILIZED, FOR SOLUTION INTRAVENOUS at 12:24

## 2017-03-08 RX ADMIN — DIPHENHYDRAMINE HCL 25 MG: 25 TABLET ORAL at 20:32

## 2017-03-08 RX ADMIN — DIPHENHYDRAMINE HYDROCHLORIDE 25 MG: 50 INJECTION, SOLUTION INTRAMUSCULAR; INTRAVENOUS at 12:24

## 2017-03-08 RX ADMIN — DIPHENHYDRAMINE HYDROCHLORIDE 25 MG: 50 INJECTION, SOLUTION INTRAMUSCULAR; INTRAVENOUS at 04:05

## 2017-03-08 RX ADMIN — VANCOMYCIN HYDROCHLORIDE 1700 MG: 10 INJECTION, POWDER, LYOPHILIZED, FOR SOLUTION INTRAVENOUS at 04:05

## 2017-03-08 ASSESSMENT — ENCOUNTER SYMPTOMS
SENSORY CHANGE: 1
CONSTIPATION: 0
HEADACHES: 0
HEMOPTYSIS: 0
NAUSEA: 0
FEVER: 0
VOMITING: 0
NECK PAIN: 1
SPEECH CHANGE: 0
BLOOD IN STOOL: 0
PND: 0
DIARRHEA: 0
EYES NEGATIVE: 1
TREMORS: 0
PALPITATIONS: 0
CHILLS: 0
ORTHOPNEA: 0
BACK PAIN: 1
SEIZURES: 0
LOSS OF CONSCIOUSNESS: 0
ABDOMINAL PAIN: 0
PSYCHIATRIC NEGATIVE: 1
SHORTNESS OF BREATH: 0
COUGH: 0

## 2017-03-08 ASSESSMENT — PAIN SCALES - GENERAL
PAINLEVEL_OUTOF10: 4
PAINLEVEL_OUTOF10: 4
PAINLEVEL_OUTOF10: 6
PAINLEVEL_OUTOF10: 6
PAINLEVEL_OUTOF10: 8

## 2017-03-08 NOTE — DISCHARGE PLANNING
[facility] spoke to  DAISY Schwartz ([phone]) She stated that they contract with [facility] for Home Care and Infusions (phone: [phone] fax: [phone]) Choice form for HH and Infusion, as well as Infusion order, faxed to [facility]. [facility] requested HH order from Hospitalist RN.

## 2017-03-08 NOTE — DOCUMENTATION QUERY
"DOCUMENTATION QUERY    PROVIDERS: Please select “Cosign w/ note”to reply to query.    To better represent the severity of illness of your patient, please review the following information and exercise your independent professional judgment in responding to this query.     Sepsis, Meningitis and Postoperative Wound Infection are all documented in the History and Physical and Progress Notes. The operative note performed on 03/03/17 stated that there was a CSF build up underneath the lamina which was found in surgery and after which the CSF fluid culture was found negative for organisms that was sent in on 03/03/17 as well. However, there were cultures taken from the lower back wound that were positive for \"Viridans Streptococcus.\"     Other documentation has the diagnosis of Sepsis : Meningitis vs Wound Infection.   Based upon the clinical findings, risk factors, and treatment, can this diagnosis be further specified?      · Meningitis has resolved  · Meningitis has been ruled in  · Meningitis has been ruled out  · Other explanation of clinical findings  · Unable to determine        The medical record reflects the following:    Clinical Findings   Recent Lumbar surgery with CSF leak. CSF negative for organisms. WBC 11.8 Tachycardia and Tachypnea at admit      Treatment   Fluids, I&D of CSF build up location with Laminotomy & durotomy to close the length of the dural opening.     Risk Factors   Postop Wound infection with CSF leak, Severe HA and Neck pain    Location within medical record   H&P, Progress notes       Thank you for your time.    Sincerely,    Kellen Navarro RN  Clinical Documentation Improvement Specialist    [phone]  [email or web address]                      "

## 2017-03-08 NOTE — PROGRESS NOTES
Assumed care of pt. A/Ox4. Able to make needs known. Up self. Instructed to call for assistance. Right arm PICC line, flushing well, blood return. Left foot drop. Plan is for 6 weeks antibiotics after discharge. Call light in reach, bed in low position, will continue hourly rounding.

## 2017-03-08 NOTE — DISCHARGE PLANNING
SW found another number for pt's worker's comp - [employer] ([phone]) SW left message at this number requesting return call. SW cannot send infusion referral without first talking to insurance to determine what agency they contract with.

## 2017-03-08 NOTE — FACE TO FACE
Face to Face Supporting Documentation - Home Health    The encounter with this patient was in whole or in part the primary reason for home health admission.    Date of encounter:   Patient:                    MRN:                       YOB: 2015  Rodriguez Kramer  [id number]  1980     Home health to see patient for:  Skilled Nursing care for assessment, interventions & education    Skilled need for:  Surgical Aftercare wound infection    Skilled nursing interventions to include:  Home IV infusion therapy    Homebound status evidenced by:  Needs the assistance of another person in order to leave the home. Leaving home requires a considerable and taxing effort. There is a normal inability to leave the home.    Community Physician to provide follow up care: TERRIE Bush     Optional Interventions? No      I certify the face to face encounter for this home health care referral meets the CMS requirements and the encounter/clinical assessment with the patient was, in whole, or in part, for the medical condition(s) listed above, which is the primary reason for home health care. Based on my clinical findings: the service(s) are medically necessary, support the need for home health care, and the homebound criteria are met.  I certify that this patient has had a face to face encounter by myself.  Andrew Montanez M.D. - NPI: [id number]

## 2017-03-08 NOTE — DISCHARGE PLANNING
Received choice form from Jenny([employer]). Home health and home infusion referral sent to [facility] per Jenny's([employer]) request as pt. has workers comp case.

## 2017-03-08 NOTE — PROGRESS NOTES
"Pharmacy Kinetics 36 y.o. male on vancomycin day # 7 3/8/2017    Currently Dose: Vancomycin 1700 mg iv q8hr (~17 mg/kg)  Received Load Dose: Yes      Indication for Treatment: meningitis vs surgical site infection rule-out  ID Service Following: Yes    Pertinent history per medical record: Admitted on 3/2/2017 for 3/2/17 for post-op wound evaluation. Patient s/p recent microdiskectomy (Dr Calderon) 17. Patient with history of multiple spinal surgery interventions. Per admission H&P severe HA, neck pain, and foot drop noted. ID service following. Given overlying infection no LP performed on admission (risk of bacteria translocation to CSF) but performed later during hospital stay. Currently admitted to neurosciences unit.    Other antibiotics: none    Allergies: Nkda     List concerns for accumulation of vancomycin: Hx spinal/surgical manipulation, electrolyte derangement, lactic acidosis    Pertinent cultures to date:   3/3/17 CSF culture x3 (NEG)  3/2/17 urine culture x1 (NEG)  3/2/17 lower back wound culture x1 (Viridans Streptococcus & Coagulase-negative Staphylococcus species)  3/2/17 lower back wound culture x1 (NEG)  3/2/17 blood culture x2 (NEG)    Recent Labs      17   1102  17   0402  17   0326   WBC  14.0*  14.4*  12.6*   NEUTSPOLYS  62.40   --    --      Recent Labs      17   1102  17   0402  17   0326   BUN  8  9  14   CREATININE  0.68  0.65  0.69   ALBUMIN  3.5   --    --      Recent Labs      17   1140   VANCOTROUGH  15.2     Blood pressure 115/69, pulse 87, temperature 36.3 °C (97.4 °F), resp. rate 17, height 1.829 m (6' 0.01\"), weight 89.5 kg (197 lb 5 oz), SpO2 95 %. Temp (24hrs), Av.3 °C (97.4 °F), Min:35.9 °C (96.7 °F), Max:36.8 °C (98.2 °F)    Estimated Creatinine Clearance: 165.6 mL/min (by C-G formula based on Cr of 0.69).    A/P   1. Vancomycin dose change: not indicated   2. Next vancomycin level: 3/9/17 @0330 (ordered)  3. Goal trough: 18-22 " mcg/mL  4. Comments: VS stable and patient afebrile overnight. Latest WBC elevated. CrCl ~166 mL/min and stable. No new microbiology. ID service following. Likely transition to daptomycin on discharge. Given recent dose adjustment routine trough ordered 3/9/17 to ensure appropriate dosing continues. Pharmacy will continue to follow and adjust as appropriate.    Ronni Siddiqui, PHARMD

## 2017-03-08 NOTE — PROGRESS NOTES
Patient c/o of rash and increase in itching in axiliary and abdominal area. Benadryl has already been given. Notified Seda Borden.    Per Seda decrease running IV vanco by half and give IV benadryl when he can have a dose.

## 2017-03-08 NOTE — PROGRESS NOTES
Hospital Medicine Progress Note, Adult, Complex               Author: Baldomero Phillips Date & Time created: 3/7/2017  5:09 PM      ID/CC: 37 y/o M admitted for severe HA , worsening back pain possible lumbar infection of surgical site and sepsis.     Interval History:      No overnight events, picc line placed.     Review of Systems:  Review of Systems   Constitutional: Negative for fever and chills.   HENT: Negative for congestion and nosebleeds.    Eyes: Negative.    Respiratory: Negative for cough, hemoptysis and shortness of breath.    Cardiovascular: Negative for chest pain, palpitations and orthopnea.   Gastrointestinal: Negative for nausea, vomiting, blood in stool and melena.   Genitourinary: Negative for dysuria and urgency.   Musculoskeletal: Positive for back pain and neck pain (improved).   Neurological: Negative for speech change, seizures, loss of consciousness and headaches.        Left Foot drop   Psychiatric/Behavioral: Negative.    All other systems reviewed and are negative.      Physical Exam:  Physical Exam   Constitutional: He is oriented to person, place, and time. He appears well-developed.   HENT:   Head: Normocephalic and atraumatic.   Right Ear: External ear normal.   Left Ear: External ear normal.   Eyes: Conjunctivae are normal. Right eye exhibits no discharge. Left eye exhibits no discharge. No scleral icterus.   Neck: No JVD present. No tracheal deviation present.   Cardiovascular: Normal heart sounds.  Exam reveals no gallop and no friction rub.    No murmur heard.  Pulmonary/Chest: Effort normal. No stridor. He has no wheezes. He has no rales.   Abdominal: Soft. Bowel sounds are normal. He exhibits no distension. There is no tenderness. There is no rebound and no guarding.   Musculoskeletal: He exhibits no edema.   In the back there is midline vertical incision scar appears well  healed. no  secretion fluids appreciated  but there is erythema present.    Neurological: He is alert and  oriented to person, place, and time. No cranial nerve deficit.   Left foot drop   Skin: Skin is warm. He is not diaphoretic. There is erythema.   Psychiatric: He has a normal mood and affect.   Nursing note and vitals reviewed.      Labs:        Invalid input(s): [id number]      Recent Labs      17   1102  172  17   0326   SODIUM  135  135  139   POTASSIUM  3.5*  3.8  4.0   CHLORIDE  104  103  104   CO2  24  24  26   BUN  8  9  14   CREATININE  0.68  0.65  0.69   CALCIUM  9.5  9.4  9.3     Recent Labs      17   0402  17   0326   ALTSGPT  18   --    --    ASTSGOT  20   --    --    ALKPHOSPHAT  54   --    --    TBILIRUBIN  0.4   --    --    GLUCOSE  125*  100*  101*     Recent Labs      17   0326   RBC  6.02  6.17*  6.30*   HEMOGLOBIN  16.9  17.2  17.4   HEMATOCRIT  49.5  50.5  51.5   PLATELETCT  281  275  283     Recent Labs      17   0402  17   0326   WBC  14.0*  14.4*  12.6*   NEUTSPOLYS  62.40   --    --    LYMPHOCYTES  24.40   --    --    MONOCYTES  9.20   --    --    EOSINOPHILS  2.40   --    --    BASOPHILS  0.40   --    --    ASTSGOT  20   --    --    ALTSGPT  18   --    --    ALKPHOSPHAT  54   --    --    TBILIRUBIN  0.4   --    --            Hemodynamics:  Temp (24hrs), Av.4 °C (97.5 °F), Min:35.9 °C (96.6 °F), Max:36.8 °C (98.2 °F)  Temperature: 36.8 °C (98.2 °F)  Pulse  Av.3  Min: 62  Max: 103   Blood Pressure: 122/77 mmHg     Respiratory:    Respiration: 17, Pulse Oximetry: 97 %        RUL Breath Sounds: Clear, RML Breath Sounds: Clear, RLL Breath Sounds: Clear, ALIDA Breath Sounds: Clear, LLL Breath Sounds: Clear  Fluids:  No intake or output data in the 24 hours ending 17 1640     GI/Nutrition:  Orders Placed This Encounter   Procedures   • DIET ORDER     Standing Status: Standing      Number of Occurrences: 1      Standing Expiration Date:      Order Specific  Question:  Diet:     Answer:  Regular [1]     Medical Decision Making, by Problem:  Active Hospital Problems    Diagnosis   • *Sepsis (CMS-HCC) [A41.9]  - resolved  Infected seroma , CSF leak s/p I&D lumbar wound, durotomy repair on 3/3   -on vancomycin ID following ,discussed with Dr Amato plan is for outpt daptomycin to complete 6 weeks course.   -wound cx growing strep viridans and coag neg staph     • Headache [R51]  -resolved        • Asthma [J45.909]  -stable  -prn bronchodilators    • Leukocytosis [D72.829]  - improved on IV abx monitor        Labs reviewed, Medications reviewed and Radiology images reviewed  Alejandro catheter: No Alejandro            Antibiotics: Treating active infection/contamination beyond 24 hours perioperative coverage

## 2017-03-08 NOTE — CARE PLAN
Problem: Safety  Goal: Will remain free from injury  Outcome: PROGRESSING AS EXPECTED    Problem: Venous Thromboembolism (VTW)/Deep Vein Thrombosis (DVT) Prevention:  Goal: Patient will participate in Venous Thrombosis (VTE)/Deep Vein Thrombosis (DVT)Prevention Measures  Outcome: PROGRESSING AS EXPECTED    03/08/17 0259   OTHER   Risk Assessment Score 0   VTE RISK Low   Mechanical Prophylaxis Patient educated regarding VTE risk and need for SCDs, understands and continues to refuse

## 2017-03-08 NOTE — CARE PLAN
Problem: Communication  Goal: The ability to communicate needs accurately and effectively will improve  Intervention: [place] patient and significant other/support system to call light to alert staff of needs  A/Ox4, able to make needs known       Problem: Safety  Goal: Will remain free from falls  Intervention: Assess risk factors for falls  No bed alarm, pt refused

## 2017-03-08 NOTE — PROGRESS NOTES
Assumed care of patient. AOx.4. Pain well controlled. VSS with no signs of acute distress. Tolerates PO intake well. Patient up with SBA and cane. Patient showered this evening.  Has a rash/redness in axilliary area that itches. Primary team aware.  Call light in reach. Bed alarm refused. Patient provided education on safety. Patient is aware that he should call for assistance when needed. Hourly rounding continued.

## 2017-03-08 NOTE — PROGRESS NOTES
"Infectious Disease Progress Note    Author: Kellen Amato M.D.    DOS & Time created: 3/8/2017  1:37 PM    Chief Complaint   Patient presents with   • Migraine   • Post-Op Complications     clear - pus near surgical site       Interval History:  36-year-old WM s/p 4  surgeries on his back.  The last surgery was on 2017 when he underwent  left L4-L5 lumbar microdiskectomy subsequently with copious drainage.  S/p I&D lumbar wound, durotomy repair on 3/3.    3/4 AF WBC 16.5 cxs polymicrobial. Denies SE abx. Pain controlled  3/5 AF WBC 14 still has a lot of back pain  3/6 AF, WBC 14.4.  Lower back pain, \"throbbing,\" 7/10, improves with pain medicine.  3/7 AF WBC 12.6 to get PICC line this am-denies SE abx  3/8 AF no WBC back pain is less. Good apetite. Is ambulating  Labs Reviewed, Medications Reviewed, Radiology Reviewed and Wound Reviewed.    Review of Systems:  Review of Systems   Constitutional: Negative for fever and chills.   Respiratory: Negative for shortness of breath.    Cardiovascular: Negative for chest pain.   Gastrointestinal: Negative for nausea, vomiting, abdominal pain, diarrhea and constipation.   Musculoskeletal: Positive for back pain.        Using a cane when ambulating   Neurological: Positive for sensory change. Negative for headaches.       Hemodynamics:  Temp (24hrs), Av.3 °C (97.4 °F), Min:35.9 °C (96.7 °F), Max:36.8 °C (98.2 °F)  Temperature: 36.2 °C (97.2 °F)  Pulse  Av.2  Min: 62  Max: 108   Blood Pressure: 134/75 mmHg       Physical Exam:  Physical Exam   Constitutional: He is oriented to person, place, and time. He appears well-developed and well-nourished. No distress.   HENT:   Head: Normocephalic and atraumatic.   Eyes: EOM are normal. Pupils are equal, round, and reactive to light. No scleral icterus.   Neck: Normal range of motion. Neck supple.   Cardiovascular: Normal rate, regular rhythm and intact distal pulses.    Pulmonary/Chest: Effort normal and breath " sounds normal. No respiratory distress. He has no wheezes.   Abdominal: Soft. Bowel sounds are normal. He exhibits no distension. There is no tenderness.   Musculoskeletal: He exhibits no edema.   Staples to lumbar spine, slightly tender to touch, no erythema, no drainage.   Neurological: He is alert and oriented to person, place, and time. Coordination normal.   Skin: Skin is warm and dry. No rash noted.   Nursing note and vitals reviewed.      Labs:  Recent Labs      03/06/17   0402  03/07/17   0326   WBC  14.4*  12.6*   RBC  6.17*  6.30*   HEMOGLOBIN  17.2  17.4   HEMATOCRIT  50.5  51.5   MCV  81.8  81.7   MCH  27.9  27.6   RDW  38.8  38.2   PLATELETCT  275  283   MPV  9.2  9.0     Recent Labs      03/06/17   0402  03/07/17   0326   SODIUM  135  139   POTASSIUM  3.8  4.0   CHLORIDE  103  104   CO2  24  26   GLUCOSE  100*  101*   BUN  9  14     Recent Labs      03/06/17   0402  03/07/17   0326   CREATININE  0.65  0.69        Imaging:  MR-LUMBAR SPINE-WITH & W/O (Final result) Result time: 03/03/17 07:39:03     Final result by Hamlet Ward M.D. (03/03/17 07:39:03)     Impression:     1.  Interval LEFT L5 hemilaminotomy with resolution of previously described disc extrusion.  2.  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  Abscess is felt unlikely.  3.  No epidural abscess identified.  4.  Central canal remains patent.  5.  RIGHT paracentral posterior disc bulging again present at L5-S1.  6.  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       • vancomycin 1,700 mg in  mL IVPB  19 mg/kg Q8HR Andrew Montanez M.D.   Stopped at 03/08/17 9874   • oxycodone  immediate release (ROXICODONE) tablet 10 mg  10 mg Q4HRS PRN Baldomero Phillips M.D.   10 mg at 03/08/17 0015    Or   • oxycodone immediate-release (ROXICODONE) tablet 15 mg  15 mg Q4HRS PRN Baldomero Phillips M.D.   15 mg at 03/08/17 0903   • morphine (pf) 4 mg/ml injection 3 mg  3 mg Q4HRS PRN Baldomero Phillips M.D.   3 mg at 03/03/17 1333   • Pharmacy Consult Request ...Pain Management Review 1 Each  1 Each PRN Christopher Calderon M.D.       • MD ALERT...Do not administer NSAIDS or ASPIRIN unless ORDERED By Neurosurgery 1 Each  1 Each PRN Christopher Calderon M.D.       • docusate sodium (COLACE) capsule 100 mg  100 mg BID Christopher Calderon M.D.   100 mg at 03/07/17 2218   • senna-docusate (PERICOLACE or SENOKOT S) 8.6-50 MG per tablet 1 Tab  1 Tab Nightly Christopher Calderon M.D.   1 Tab at 03/07/17 2218   • senna-docusate (PERICOLACE or SENOKOT S) 8.6-50 MG per tablet 1 Tab  1 Tab Q24HRS PRN Christopher Calderon M.D.       • polyethylene glycol/lytes (MIRALAX) PACKET 1 Packet  1 Packet BID PRN Christopher Calderon M.D.       • magnesium hydroxide (MILK OF MAGNESIA) suspension 30 mL  30 mL QDAY PRN Christopher Calderon M.D.       • bisacodyl (DULCOLAX) suppository 10 mg  10 mg Q24HRS PRN Christopher Calderon M.D.       • fleet enema 133 mL  1 Each Once PRN Christopher Calderon M.D.       • acetaminophen (TYLENOL) tablet 1,000 mg  1,000 mg Q6HRS Christopher Calderon M.D.   1,000 mg at 03/08/17 1224   • diphenhydrAMINE (BENADRYL) tablet/capsule 25 mg  25 mg Q6HRS PRN Christopher Calderon M.D.   25 mg at 03/07/17 2218    Or   • diphenhydrAMINE (BENADRYL) injection 25 mg  25 mg Q6HRS PRN Christopher Calderon M.D.   25 mg at 03/08/17 1224   • ondansetron (ZOFRAN) syringe/vial injection 4 mg  4 mg Q4HRS PRN Christopher Calderon M.D.       • ondansetron (ZOFRAN ODT) dispertab 4 mg  4 mg Q4HRS PRN Christopher Calderon M.D.       • promethazine (PHENERGAN) tablet 12.5-25 mg  12.5-25 mg Q4HRS PRN Christopher Calderon M.D.       • promethazine (PHENERGAN) suppository 12.5-25 mg  12.5-25 mg Q4HRS PRN Christopher BEST  "CESAR Calderon       • prochlorperazine (COMPAZINE) injection 5-10 mg  5-10 mg Q4HRS PRN Christopher Calderon M.D.       • diazepam (VALIUM) tablet 5 mg  5 mg Q6HRS PRN Christopher Calderon M.D.   5 mg at 03/04/17 1701    Or   • diazepam (VALIUM) injection 5 mg  5 mg Q6HRS PRN Christopher Calderon M.D.       • albuterol inhaler 2 Puff  2 Puff Q6HRS PRN Caleb Wilder M.D.       • gabapentin (NEURONTIN) capsule 300 mg  300 mg TID Caleb Wilder M.D.   300 mg at 03/08/17 0618   • tizanidine (ZANAFLEX) tablet 2 mg  2 mg TID PRN Caleb Wilder M.D.   2 mg at 03/03/17 0755   • NS infusion 2,994 mL  30 mL/kg Once PRN Caleb Wilder M.D.       • Respiratory Care per Protocol   Continuous RT Caleb Wilder M.D.       • NS (BOLUS) infusion 500 mL  500 mL Once PRN Caleb Wilder M.D.       • MD ALERT... vancomycin per pharmacy protocol   PRJENELLE Wilder M.D.       • acetaminophen (TYLENOL) tablet 500 mg  500 mg Q6HRS PRJENELLE Wilder M.D.         Last reviewed on 3/3/2017  2:53 PM by Darlene Tabares R.N.    Micro:  Results     Procedure Component Value Units Date/Time    BLOOD CULTURE [[id number]] Collected:  03/02/17 1100    Order Status:  Completed Specimen Information:  Blood from Peripheral Updated:  03/07/17 1300     Significant Indicator NEG      Source BLD      Site PERIPHERAL      Blood Culture No growth after 5 days of incubation.     Narrative:      Per Hospital Policy: Only change Specimen Src: to \"Line\" if  specified by physician order.    BLOOD CULTURE [[id number]] Collected:  03/02/17 1138    Order Status:  Completed Specimen Information:  Blood from Peripheral Updated:  03/07/17 1300     Significant Indicator NEG      Source BLD      Site PERIPHERAL      Blood Culture No growth after 5 days of incubation.     Narrative:      Per Hospital Policy: Only change Specimen Src: to \"Line\" if  specified by physician order.    CSF CULTURE [[id number]] Collected:  03/03/17 1522    Order Status:  Completed Specimen " Information:  CSF Updated:  03/06/17 0829     Gram Stain Result --      Result:        Rare WBCs.  No organisms seen.       Significant Indicator NEG      Source CSF      Site Lumbar Spinal Fluid      CSF Culture No growth at 72 hours.     ANAEROBIC CULTURE [[id number]] Collected:  03/03/17 1522    Order Status:  Completed Specimen Information:  CSF Updated:  03/06/17 0829     Significant Indicator NEG      Source CSF      Site Lumbar Spinal Fluid      Anaerobic Culture, Culture Res Culture in progress.     CULTURE WOUND W/ GRAM STAIN [[id number]]  (Abnormal) Collected:  03/02/17 1140    Order Status:  Completed Specimen Information:  Wound Updated:  03/04/17 1025     Gram Stain Result No organisms seen.      Significant Indicator POS (POS)      Source WND      Site Lower Back      Culture Result Wound -- (A)      Result:        Growth noted after further incubation,see below for  organism identification.       Culture Result Wound -- (A)      Result:        Viridans Streptococcus  Isolated from enrichment broth only, please correlate with  clinical condition.       Culture Result Wound -- (A)      Result:        Coagulase-negative Staphylococcus species  Isolated from enrichment broth only, please correlate with  clinical condition.      Narrative:      Leaking fluid from Surgical incision    URINE CULTURE(NEW) [[id number]] Collected:  03/02/17 1510    Order Status:  Completed Specimen Information:  Urine Updated:  03/04/17 0723     Significant Indicator NEG      Source UR      Site --      Urine Culture No growth at 48 hours     Narrative:      Indication for culture:->Emergency Room Patient    GRAM STAIN [[id number]] Collected:  03/03/17 1522    Order Status:  Completed Specimen Information:  CSF Updated:  03/03/17 1838     Significant Indicator .      Source CSF      Site Lumbar Spinal Fluid      Gram Stain Result --      Result:        Rare WBCs.  No organisms seen.      CSF CULTURE [[id number]]     Order Status:   No result Specimen Information:  CSF from Tap     GRAM STAIN [[id number]] Collected:  03/02/17 1140    Order Status:  Completed Specimen Information:  Wound Updated:  03/02/17 1608     Significant Indicator .      Source WND      Site Lower Back      Gram Stain Result No organisms seen.     Narrative:      Leaking fluid from Surgical incision    URINALYSIS [[id number]]  (Abnormal) Collected:  03/02/17 1510    Order Status:  Completed Specimen Information:  Urine Updated:  03/02/17 1545     Micro Urine Req Microscopic      Color Yellow      Character Hazy (A)      Specific Gravity 1.005      Ph >=9.0 (A)      Glucose Negative mg/dL      Ketones Negative mg/dL      Bilirubin Negative      Nitrite Negative      Leukocyte Esterase Negative      Occult Blood Negative     Narrative:      Indication for culture:->Emergency Room Patient    Blood Culture [[id number]]     Order Status:  Canceled Specimen Information:  Blood from Peripheral     Blood Culture [[id number]]     Order Status:  Canceled Specimen Information:  Blood from Peripheral     CSF CULTURE [[id number]] Collected:  03/02/17 0000    Order Status:  Canceled Specimen Information:  Other from Shunt     Narrative:      TEST CSF Culture WAS CANCELLED, 03/02/2017 13:04  error.[email or web address] Avila  Leaking fluid from Surgical incision    Urinalysis [[id number]] Collected:  03/02/17 0000    Order Status:  Canceled Specimen Information:  Urine     Narrative:      TEST Urinalysis WAS CANCELLED, 03/02/2017 15:18 Duplicate .  03/02/2017  15:18  If not done within the last 24 hours  Send central line distal (brown) port venous blood gas    Culture Respiratory W/ GRM STN [[id number]] Collected:  03/02/17 0000    Order Status:  Canceled Specimen Information:  Sputum from Sputum     Narrative:      TEST Respiratory Culture w/ GS WAS CANCELLED, 03/06/17 01:05 Test  autocancelled, not collected or received  Sputum cultures, induced if needed. If not done within the  last  24 hours          Assessment:  Active Hospital Problems    Diagnosis   • *Sepsis (CMS-HCC) [A41.9]   • Asthma [J45.909]   • Leukocytosis [D72.829]       Plan:  Postoperative CSF leak.  Infected seroma   Afebrile  S/p I&D lumbar wound, durotomy repair on 3/3 with Dr. Calderon   OR cultures, polymicrobial- Strep Viridans and Coag neg Staph.    Continue IV Vancomycin while in hosp  Monitor renal function closely while on vancomycin. Check BMP in am  Will switch to daptomycin for once daily infusion at discharge  Anticipate 6 weeks IV abx from surgery, planned end date 4/14/17.  PICC done-orders given to  3/7    Leukocytosis, persistent  Decreased  Continue to monitor to resolution  Check CBC in am    DW IM

## 2017-03-09 LAB
ANION GAP SERPL CALC-SCNC: 10 MMOL/L (ref 0–11.9)
BASOPHILS # BLD AUTO: 1.2 % (ref 0–1.8)
BASOPHILS # BLD: 0.1 K/UL (ref 0–0.12)
BUN SERPL-MCNC: 16 MG/DL (ref 8–22)
CALCIUM SERPL-MCNC: 9.3 MG/DL (ref 8.5–10.5)
CHLORIDE SERPL-SCNC: 104 MMOL/L (ref 96–112)
CO2 SERPL-SCNC: 26 MMOL/L (ref 20–33)
CREAT SERPL-MCNC: 0.73 MG/DL (ref 0.5–1.4)
EOSINOPHIL # BLD AUTO: 0.71 K/UL (ref 0–0.51)
EOSINOPHIL NFR BLD: 8.2 % (ref 0–6.9)
ERYTHROCYTE [DISTWIDTH] IN BLOOD BY AUTOMATED COUNT: 38.5 FL (ref 35.9–50)
GFR SERPL CREATININE-BSD FRML MDRD: >60 ML/MIN/1.73 M 2
GLUCOSE SERPL-MCNC: 117 MG/DL (ref 65–99)
HCT VFR BLD AUTO: 42.9 % (ref 42–52)
HGB BLD-MCNC: 14.5 G/DL (ref 14–18)
IMM GRANULOCYTES # BLD AUTO: 0.09 K/UL (ref 0–0.11)
IMM GRANULOCYTES NFR BLD AUTO: 1 % (ref 0–0.9)
LYMPHOCYTES # BLD AUTO: 2.85 K/UL (ref 1–4.8)
LYMPHOCYTES NFR BLD: 33.1 % (ref 22–41)
MCH RBC QN AUTO: 27.8 PG (ref 27–33)
MCHC RBC AUTO-ENTMCNC: 33.8 G/DL (ref 33.7–35.3)
MCV RBC AUTO: 82.3 FL (ref 81.4–97.8)
MONOCYTES # BLD AUTO: 0.94 K/UL (ref 0–0.85)
MONOCYTES NFR BLD AUTO: 10.9 % (ref 0–13.4)
NEUTROPHILS # BLD AUTO: 3.92 K/UL (ref 1.82–7.42)
NEUTROPHILS NFR BLD: 45.6 % (ref 44–72)
NRBC # BLD AUTO: 0 K/UL
NRBC BLD AUTO-RTO: 0 /100 WBC
PLATELET # BLD AUTO: 204 K/UL (ref 164–446)
PMV BLD AUTO: 9.2 FL (ref 9–12.9)
POTASSIUM SERPL-SCNC: 3.9 MMOL/L (ref 3.6–5.5)
RBC # BLD AUTO: 5.21 M/UL (ref 4.7–6.1)
SODIUM SERPL-SCNC: 140 MMOL/L (ref 135–145)
VANCOMYCIN TROUGH SERPL-MCNC: 16.7 UG/ML (ref 10–20)
WBC # BLD AUTO: 8.6 K/UL (ref 4.8–10.8)

## 2017-03-09 PROCEDURE — A9270 NON-COVERED ITEM OR SERVICE: HCPCS | Performed by: INTERNAL MEDICINE

## 2017-03-09 PROCEDURE — 85025 COMPLETE CBC W/AUTO DIFF WBC: CPT

## 2017-03-09 PROCEDURE — 700111 HCHG RX REV CODE 636 W/ 250 OVERRIDE (IP): Performed by: NEUROLOGICAL SURGERY

## 2017-03-09 PROCEDURE — 700112 HCHG RX REV CODE 229: Performed by: NEUROLOGICAL SURGERY

## 2017-03-09 PROCEDURE — A9270 NON-COVERED ITEM OR SERVICE: HCPCS | Performed by: HOSPITALIST

## 2017-03-09 PROCEDURE — A9270 NON-COVERED ITEM OR SERVICE: HCPCS | Performed by: NURSE PRACTITIONER

## 2017-03-09 PROCEDURE — 700105 HCHG RX REV CODE 258: Performed by: HOSPITALIST

## 2017-03-09 PROCEDURE — A9270 NON-COVERED ITEM OR SERVICE: HCPCS | Performed by: NEUROLOGICAL SURGERY

## 2017-03-09 PROCEDURE — 80048 BASIC METABOLIC PNL TOTAL CA: CPT

## 2017-03-09 PROCEDURE — 700111 HCHG RX REV CODE 636 W/ 250 OVERRIDE (IP): Performed by: HOSPITALIST

## 2017-03-09 PROCEDURE — 80202 ASSAY OF VANCOMYCIN: CPT

## 2017-03-09 PROCEDURE — 700102 HCHG RX REV CODE 250 W/ 637 OVERRIDE(OP): Performed by: NEUROLOGICAL SURGERY

## 2017-03-09 PROCEDURE — 700102 HCHG RX REV CODE 250 W/ 637 OVERRIDE(OP): Performed by: INTERNAL MEDICINE

## 2017-03-09 PROCEDURE — 99232 SBSQ HOSP IP/OBS MODERATE 35: CPT | Performed by: HOSPITALIST

## 2017-03-09 PROCEDURE — 770006 HCHG ROOM/CARE - MED/SURG/GYN SEMI*

## 2017-03-09 PROCEDURE — 700102 HCHG RX REV CODE 250 W/ 637 OVERRIDE(OP): Performed by: HOSPITALIST

## 2017-03-09 PROCEDURE — 700102 HCHG RX REV CODE 250 W/ 637 OVERRIDE(OP): Performed by: NURSE PRACTITIONER

## 2017-03-09 RX ORDER — CALCIUM CARBONATE 500 MG/1
500 TABLET, CHEWABLE ORAL DAILY
Status: DISCONTINUED | OUTPATIENT
Start: 2017-03-09 | End: 2017-03-10 | Stop reason: HOSPADM

## 2017-03-09 RX ORDER — FAMOTIDINE 20 MG/1
20 TABLET, FILM COATED ORAL 2 TIMES DAILY
Status: DISCONTINUED | OUTPATIENT
Start: 2017-03-09 | End: 2017-03-10 | Stop reason: HOSPADM

## 2017-03-09 RX ADMIN — DIPHENHYDRAMINE HYDROCHLORIDE 25 MG: 50 INJECTION, SOLUTION INTRAMUSCULAR; INTRAVENOUS at 21:17

## 2017-03-09 RX ADMIN — DIPHENHYDRAMINE HCL 25 MG: 25 TABLET ORAL at 04:31

## 2017-03-09 RX ADMIN — DIAZEPAM 5 MG: 5 INJECTION, SOLUTION INTRAMUSCULAR; INTRAVENOUS at 02:45

## 2017-03-09 RX ADMIN — OXYCODONE HYDROCHLORIDE 15 MG: 5 TABLET ORAL at 01:37

## 2017-03-09 RX ADMIN — VANCOMYCIN HYDROCHLORIDE 1700 MG: 10 INJECTION, POWDER, LYOPHILIZED, FOR SOLUTION INTRAVENOUS at 11:58

## 2017-03-09 RX ADMIN — ANTACID TABLETS 500 MG: 500 TABLET, CHEWABLE ORAL at 08:01

## 2017-03-09 RX ADMIN — VANCOMYCIN HYDROCHLORIDE 1700 MG: 10 INJECTION, POWDER, LYOPHILIZED, FOR SOLUTION INTRAVENOUS at 04:31

## 2017-03-09 RX ADMIN — VANCOMYCIN HYDROCHLORIDE 1700 MG: 10 INJECTION, POWDER, LYOPHILIZED, FOR SOLUTION INTRAVENOUS at 20:58

## 2017-03-09 RX ADMIN — DOCUSATE SODIUM 100 MG: 100 CAPSULE ORAL at 08:01

## 2017-03-09 RX ADMIN — DOCUSATE SODIUM 100 MG: 100 CAPSULE ORAL at 21:10

## 2017-03-09 RX ADMIN — OXYCODONE HYDROCHLORIDE 10 MG: 10 TABLET ORAL at 18:31

## 2017-03-09 RX ADMIN — ACETAMINOPHEN 500 MG: 500 TABLET, FILM COATED ORAL at 01:37

## 2017-03-09 RX ADMIN — OXYCODONE HYDROCHLORIDE 10 MG: 10 TABLET ORAL at 13:32

## 2017-03-09 RX ADMIN — DIPHENHYDRAMINE HCL 25 MG: 25 TABLET ORAL at 11:58

## 2017-03-09 RX ADMIN — GABAPENTIN 300 MG: 300 CAPSULE ORAL at 21:03

## 2017-03-09 RX ADMIN — FAMOTIDINE 20 MG: 20 TABLET, FILM COATED ORAL at 13:32

## 2017-03-09 RX ADMIN — FAMOTIDINE 20 MG: 20 TABLET, FILM COATED ORAL at 21:03

## 2017-03-09 RX ADMIN — Medication 1 TABLET: at 21:10

## 2017-03-09 RX ADMIN — GABAPENTIN 300 MG: 300 CAPSULE ORAL at 05:05

## 2017-03-09 RX ADMIN — GABAPENTIN 300 MG: 300 CAPSULE ORAL at 13:32

## 2017-03-09 RX ADMIN — OXYCODONE HYDROCHLORIDE 10 MG: 10 TABLET ORAL at 08:01

## 2017-03-09 ASSESSMENT — ENCOUNTER SYMPTOMS
EYES NEGATIVE: 1
SPEECH CHANGE: 0
PND: 0
PALPITATIONS: 0
COUGH: 0
FALLS: 0
BACK PAIN: 1
DIARRHEA: 0
SHORTNESS OF BREATH: 0
LOSS OF CONSCIOUSNESS: 0
PSYCHIATRIC NEGATIVE: 1
BLOOD IN STOOL: 0
FEVER: 0
HEADACHES: 0
TREMORS: 0
DIZZINESS: 0
VOMITING: 0
HEARTBURN: 1
CHILLS: 0
ORTHOPNEA: 0
NAUSEA: 0
HEMOPTYSIS: 0
SEIZURES: 0

## 2017-03-09 ASSESSMENT — PAIN SCALES - GENERAL
PAINLEVEL_OUTOF10: 5
PAINLEVEL_OUTOF10: 3
PAINLEVEL_OUTOF10: 6

## 2017-03-09 ASSESSMENT — PATIENT HEALTH QUESTIONNAIRE - PHQ9
2. FEELING DOWN, DEPRESSED, IRRITABLE, OR HOPELESS: NOT AT ALL
1. LITTLE INTEREST OR PLEASURE IN DOING THINGS: NOT AT ALL
SUM OF ALL RESPONSES TO PHQ9 QUESTIONS 1 AND 2: 0
SUM OF ALL RESPONSES TO PHQ QUESTIONS 1-9: 0

## 2017-03-09 NOTE — DISCHARGE PLANNING
EVERETT spoke to Mariana with Worker[employer] and Roro with [employer] ([phone]) regarding HH and infusion. EVERETT notified Roro and Mariana that pt is cleared to discharge as soon as we get the infusion set up. EVERETT faxed Infusion and HH orders to Mariana as requested at [phone], and emailed orders and clinicals to Roro at [email or web address]  EVERETT awaiting return call from Roro with confirmation of HH and IV Abx.

## 2017-03-09 NOTE — PROGRESS NOTES
Hospital Medicine Progress Note, Adult, Complex               Author: Baldomero Phillips Date & Time created: 3/8/2017  4:59 PM      ID/CC: 35 y/o M admitted for severe HA , worsening back pain possible lumbar infection of surgical site and sepsis.     Interval History:      Mild rash with vanc improved with slowing rate    Review of Systems:  Review of Systems   Constitutional: Negative for fever, chills and malaise/fatigue.   HENT: Negative for congestion, hearing loss and nosebleeds.    Eyes: Negative.    Respiratory: Negative for cough, hemoptysis and shortness of breath.    Cardiovascular: Negative for chest pain, palpitations, orthopnea and PND.   Gastrointestinal: Negative for nausea, vomiting, diarrhea, blood in stool and melena.   Genitourinary: Negative for dysuria and urgency.   Musculoskeletal: Positive for back pain and neck pain (improved).   Skin: Positive for rash.   Neurological: Negative for tremors, speech change, seizures, loss of consciousness and headaches.        Left Foot drop   Psychiatric/Behavioral: Negative.    All other systems reviewed and are negative.      Physical Exam:  Physical Exam   Constitutional: He is oriented to person, place, and time. He appears well-developed.   HENT:   Head: Normocephalic and atraumatic.   Mouth/Throat: No oropharyngeal exudate.   Eyes: Right eye exhibits no discharge. Left eye exhibits no discharge. No scleral icterus.   Neck: Neck supple. No JVD present. No tracheal deviation present.   Cardiovascular: Normal heart sounds.  Exam reveals no gallop and no friction rub.    No murmur heard.  Pulmonary/Chest: Effort normal. No stridor. He has no wheezes. He has no rales. He exhibits no tenderness.   Abdominal: Soft. Bowel sounds are normal. He exhibits no distension. There is no tenderness. There is no rebound.   Musculoskeletal: He exhibits no edema.   Neurological: He is alert and oriented to person, place, and time. No cranial nerve deficit.   Left foot drop  unchanged   Skin: Skin is warm. Rash (mild erythematous) noted. He is not diaphoretic.   Psychiatric: He has a normal mood and affect. His behavior is normal. Thought content normal.   Nursing note and vitals reviewed.      Labs:        Invalid input(s): [id number]      Recent Labs      17   032   SODIUM  135  139   POTASSIUM  3.8  4.0   CHLORIDE  103  104   CO2  24  26   BUN  9  14   CREATININE  0.65  0.69   CALCIUM  9.4  9.3     Recent Labs      17   032   GLUCOSE  100*  101*     Recent Labs      17   032   RBC  6.17*  6.30*   HEMOGLOBIN  17.2  17.4   HEMATOCRIT  50.5  51.5   PLATELETCT  275  283     Recent Labs      17   WBC  14.4*  12.6*           Hemodynamics:  Temp (24hrs), Av.2 °C (97.1 °F), Min:35.9 °C (96.7 °F), Max:36.3 °C (97.4 °F)  Temperature: 36.2 °C (97.2 °F)  Pulse  Av.2  Min: 62  Max: 108   Blood Pressure: 134/75 mmHg     Respiratory:    Respiration: 17, Pulse Oximetry: 100 %        RUL Breath Sounds: Clear, RML Breath Sounds: Clear, RLL Breath Sounds: Clear, ALIDA Breath Sounds: Clear, LLL Breath Sounds: Clear  Fluids:  No intake or output data in the 24 hours ending 17 1659     GI/Nutrition:  Orders Placed This Encounter   Procedures   • DIET ORDER     Standing Status: Standing      Number of Occurrences: 1      Standing Expiration Date:      Order Specific Question:  Diet:     Answer:  Regular [1]     Medical Decision Making, by Problem:  Active Hospital Problems    Diagnosis   • *Sepsis (CMS-HCC) [A41.9]  - resolved  Infected seroma , CSF leak s/p I&D lumbar wound, durotomy repair on 3/3   -on vancomycin ID following , plan is for outpt daptomycin to complete 6 weeks course when insurance approval received  -wound cx grew strep viridans and coag neg staph     • Headache [R51]  -resolved        • Asthma [J45.909]  -stable  -prn bronchodilators            Labs reviewed,  Medications reviewed and Radiology images reviewed  Alejandro catheter: No Alejandro            Antibiotics: Treating active infection/contamination beyond 24 hours perioperative coverage

## 2017-03-09 NOTE — DISCHARGE PLANNING
EVERETT left message for Krystle requesting follow up for IV abx and Home Health. Pt is cleared to DC once these items are setup.

## 2017-03-09 NOTE — PROGRESS NOTES
"Pharmacy Kinetics 36 y.o. male on vancomycin day # 8  3/9/2017    Currently on Vancomycin 1700 mg iv q8hr (04, 12, 20)    Indication for Treatment: Infected post-op seroma with CNS involvement    Pertinent history per medical record: Admitted on 3/2/2017 for post-op wound evaluation. Patient s/p recent microdiskectomy (Dr Calderon) 17. Patient with history of multiple spinal surgery interventions. Patient presented with severe HA, neck pain, and foot drop noted. Delay in LP completion d/t overlying wound infection (risk of bacteria translocation to CSF), ID consulting.     Other antibiotics: None    Allergies: Nkda     Concerns for renal function include BUN/SCr ratio > 20:1 and prolonged course of vancomycin.    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 Abscess is felt unlikely. No epidural abscess identified.    Recent Labs      17   0326  17   0450   WBC  12.6*  8.6   NEUTSPOLYS   --   45.60     Recent Labs      17   0326  17   0450   BUN  14  16   CREATININE  0.69  0.73     Recent Labs      17   1140  17   0450   VANCOTROUGH  15.2  16.7     No intake or output data in the 24 hours ending 17 1545     Blood pressure 126/78, pulse 98, temperature 36.1 °C (97 °F), resp. rate 18, height 1.829 m (6' 0.01\"), weight 89.5 kg (197 lb 5 oz), SpO2 97 %. Temp (24hrs), Av.3 °C (97.4 °F), Min:35.8 °C (96.5 °F), Max:36.7 °C (98.1 °F)      A/P   1. Vancomycin dose change: Continue current regimen  2. Next vancomycin level: 2-3 days (not currently ordered)  3. Goal trough: 18-22 " mcg/mL  4. Comments: Patient continues on vanco, dose adjusted 3/7 for subtherapeutic trough. Renal indices stable today, leukocytosis improved, repeat vanco trough trending toward therapeutic range. Anticipate patient will achieve therapeutic troughs with continued dosing of vanco, so will continue current vanco regimen and consider repeat trough in ~2 days to guide further dose adjustments. Tentative plan per discussion with ID to complete vanco on 3/11 then transition to IV daptomycin for discharge.      Pharmacy will continue to follow.     Diamond Bailey, KerlineD

## 2017-03-09 NOTE — CARE PLAN
Problem: Safety  Goal: Will remain free from injury  Outcome: PROGRESSING AS EXPECTED  Pt ambulatory independently and is stable    Problem: Infection  Goal: Will remain free from infection  Outcome: PROGRESSING AS EXPECTED  Pt practices good hand hygene and keeps incision and picc line clean and dry    Problem: Pain Management  Goal: Pain level will decrease to patient’s comfort goal  Medicated with prn meds as needed as well as position changes and walks to assist with pain

## 2017-03-10 VITALS
BODY MASS INDEX: 26.73 KG/M2 | SYSTOLIC BLOOD PRESSURE: 115 MMHG | TEMPERATURE: 97.2 F | DIASTOLIC BLOOD PRESSURE: 67 MMHG | RESPIRATION RATE: 18 BRPM | HEART RATE: 90 BPM | HEIGHT: 72 IN | OXYGEN SATURATION: 96 % | WEIGHT: 197.31 LBS

## 2017-03-10 PROCEDURE — A9270 NON-COVERED ITEM OR SERVICE: HCPCS | Performed by: INTERNAL MEDICINE

## 2017-03-10 PROCEDURE — 700111 HCHG RX REV CODE 636 W/ 250 OVERRIDE (IP): Performed by: HOSPITALIST

## 2017-03-10 PROCEDURE — 700102 HCHG RX REV CODE 250 W/ 637 OVERRIDE(OP): Performed by: INTERNAL MEDICINE

## 2017-03-10 PROCEDURE — A9270 NON-COVERED ITEM OR SERVICE: HCPCS | Performed by: NEUROLOGICAL SURGERY

## 2017-03-10 PROCEDURE — 700102 HCHG RX REV CODE 250 W/ 637 OVERRIDE(OP): Performed by: NEUROLOGICAL SURGERY

## 2017-03-10 PROCEDURE — 700102 HCHG RX REV CODE 250 W/ 637 OVERRIDE(OP): Performed by: NURSE PRACTITIONER

## 2017-03-10 PROCEDURE — 700105 HCHG RX REV CODE 258: Performed by: HOSPITALIST

## 2017-03-10 PROCEDURE — 700111 HCHG RX REV CODE 636 W/ 250 OVERRIDE (IP)

## 2017-03-10 PROCEDURE — 99239 HOSP IP/OBS DSCHRG MGMT >30: CPT | Performed by: HOSPITALIST

## 2017-03-10 PROCEDURE — 700105 HCHG RX REV CODE 258

## 2017-03-10 PROCEDURE — 700102 HCHG RX REV CODE 250 W/ 637 OVERRIDE(OP): Performed by: HOSPITALIST

## 2017-03-10 PROCEDURE — 700112 HCHG RX REV CODE 229: Performed by: NEUROLOGICAL SURGERY

## 2017-03-10 PROCEDURE — A9270 NON-COVERED ITEM OR SERVICE: HCPCS | Performed by: NURSE PRACTITIONER

## 2017-03-10 PROCEDURE — A9270 NON-COVERED ITEM OR SERVICE: HCPCS | Performed by: HOSPITALIST

## 2017-03-10 RX ORDER — BUTALBITAL, ACETAMINOPHEN AND CAFFEINE 50; 325; 40 MG/1; MG/1; MG/1
1 TABLET ORAL EVERY 6 HOURS PRN
Status: DISCONTINUED | OUTPATIENT
Start: 2017-03-10 | End: 2017-03-10 | Stop reason: HOSPADM

## 2017-03-10 RX ORDER — POLYETHYLENE GLYCOL 3350 17 G/17G
17 POWDER, FOR SOLUTION ORAL DAILY
Qty: 30 EACH | Refills: 0 | Status: SHIPPED | OUTPATIENT
Start: 2017-03-10 | End: 2017-04-07

## 2017-03-10 RX ORDER — OXYCODONE HYDROCHLORIDE 10 MG/1
10 TABLET ORAL EVERY 4 HOURS PRN
Qty: 45 TAB | Refills: 0 | Status: SHIPPED | OUTPATIENT
Start: 2017-03-10 | End: 2017-04-07

## 2017-03-10 RX ORDER — ONDANSETRON 4 MG/1
4 TABLET, ORALLY DISINTEGRATING ORAL EVERY 4 HOURS PRN
Qty: 10 TAB | Refills: 0 | Status: SHIPPED | OUTPATIENT
Start: 2017-03-10 | End: 2017-03-10

## 2017-03-10 RX ORDER — POLYETHYLENE GLYCOL 3350 17 G/17G
17 POWDER, FOR SOLUTION ORAL DAILY
Qty: 30 EACH | Refills: 0 | Status: SHIPPED | OUTPATIENT
Start: 2017-03-10 | End: 2017-03-10

## 2017-03-10 RX ORDER — ONDANSETRON 4 MG/1
4 TABLET, ORALLY DISINTEGRATING ORAL EVERY 4 HOURS PRN
Qty: 10 TAB | Refills: 0 | Status: SHIPPED | OUTPATIENT
Start: 2017-03-10 | End: 2017-04-07

## 2017-03-10 RX ADMIN — OXYCODONE HYDROCHLORIDE 15 MG: 5 TABLET ORAL at 13:37

## 2017-03-10 RX ADMIN — OXYCODONE HYDROCHLORIDE 15 MG: 5 TABLET ORAL at 18:11

## 2017-03-10 RX ADMIN — DIPHENHYDRAMINE HCL 25 MG: 25 TABLET ORAL at 12:30

## 2017-03-10 RX ADMIN — VANCOMYCIN HYDROCHLORIDE 1700 MG: 100 INJECTION, POWDER, LYOPHILIZED, FOR SOLUTION INTRAVENOUS at 12:25

## 2017-03-10 RX ADMIN — GABAPENTIN 300 MG: 300 CAPSULE ORAL at 05:20

## 2017-03-10 RX ADMIN — FAMOTIDINE 20 MG: 20 TABLET, FILM COATED ORAL at 08:57

## 2017-03-10 RX ADMIN — DAPTOMYCIN 540 MG: 500 INJECTION, POWDER, LYOPHILIZED, FOR SOLUTION INTRAVENOUS at 17:31

## 2017-03-10 RX ADMIN — BUTALBITAL, ACETAMINOPHEN, AND CAFFEINE 1 TABLET: 50; 325; 40 TABLET ORAL at 17:36

## 2017-03-10 RX ADMIN — OXYCODONE HYDROCHLORIDE 10 MG: 10 TABLET ORAL at 05:20

## 2017-03-10 RX ADMIN — DOCUSATE SODIUM 100 MG: 100 CAPSULE ORAL at 08:57

## 2017-03-10 RX ADMIN — VANCOMYCIN HYDROCHLORIDE 1700 MG: 10 INJECTION, POWDER, LYOPHILIZED, FOR SOLUTION INTRAVENOUS at 05:20

## 2017-03-10 RX ADMIN — GABAPENTIN 300 MG: 300 CAPSULE ORAL at 13:35

## 2017-03-10 RX ADMIN — DIPHENHYDRAMINE HCL 25 MG: 25 TABLET ORAL at 05:19

## 2017-03-10 RX ADMIN — ANTACID TABLETS 500 MG: 500 TABLET, CHEWABLE ORAL at 08:57

## 2017-03-10 ASSESSMENT — PAIN SCALES - WONG BAKER
WONGBAKER_NUMERICALRESPONSE: HURTS JUST A LITTLE BIT

## 2017-03-10 ASSESSMENT — PAIN SCALES - GENERAL
PAINLEVEL_OUTOF10: 4

## 2017-03-10 ASSESSMENT — PATIENT HEALTH QUESTIONNAIRE - PHQ9
SUM OF ALL RESPONSES TO PHQ QUESTIONS 1-9: 0
1. LITTLE INTEREST OR PLEASURE IN DOING THINGS: NOT AT ALL
2. FEELING DOWN, DEPRESSED, IRRITABLE, OR HOPELESS: NOT AT ALL
SUM OF ALL RESPONSES TO PHQ9 QUESTIONS 1 AND 2: 0

## 2017-03-10 NOTE — PROGRESS NOTES
Hospital Medicine Progress Note, Adult, Complex               Author: Baldomero Phillips Date & Time created: 3/9/2017  4:53 PM      ID/CC: 35 y/o M admitted for severe HA , worsening back pain possible lumbar infection of surgical site and sepsis.     Interval History:      Tolerating vanc with slow infusion and benadryl     Review of Systems:  Review of Systems   Constitutional: Negative for fever, chills and malaise/fatigue.   HENT: Negative for congestion, hearing loss and nosebleeds.    Eyes: Negative.    Respiratory: Negative for cough, hemoptysis and shortness of breath.    Cardiovascular: Negative for chest pain, palpitations, orthopnea and PND.   Gastrointestinal: Positive for heartburn. Negative for nausea, vomiting, diarrhea, blood in stool and melena.   Genitourinary: Negative for dysuria and urgency.   Musculoskeletal: Positive for back pain (improved). Negative for falls.   Skin: Positive for rash.   Neurological: Negative for dizziness, tremors, speech change, seizures, loss of consciousness and headaches.        Left Foot drop   Psychiatric/Behavioral: Negative.    All other systems reviewed and are negative.      Physical Exam:  Physical Exam   Constitutional: He is oriented to person, place, and time. He appears well-developed.   HENT:   Head: Normocephalic and atraumatic.   Right Ear: External ear normal.   Left Ear: External ear normal.   Mouth/Throat: No oropharyngeal exudate.   Eyes: Conjunctivae are normal. Right eye exhibits no discharge. Left eye exhibits no discharge. No scleral icterus.   Neck: Neck supple. No JVD present. No tracheal deviation present.   Cardiovascular: Normal heart sounds.  Exam reveals no gallop and no friction rub.    No murmur heard.  Pulmonary/Chest: Effort normal and breath sounds normal. He exhibits no tenderness.   Abdominal: Soft. Bowel sounds are normal. He exhibits no distension. There is no tenderness. There is no rebound.   Musculoskeletal: He exhibits no edema.    Neurological: He is alert and oriented to person, place, and time. No cranial nerve deficit.   Left foot drop unchanged   Skin: Skin is warm. He is not diaphoretic. No erythema.   Wound with no erythema or drainage   Psychiatric: He has a normal mood and affect. His behavior is normal.   Nursing note and vitals reviewed.      Labs:        Invalid input(s): [id number]      Recent Labs      17   0450   SODIUM  139  140   POTASSIUM  4.0  3.9   CHLORIDE  104  104   CO2  26  26   BUN  14  16   CREATININE  0.69  0.73   CALCIUM  9.3  9.3     Recent Labs      17   0450   GLUCOSE  101*  117*     Recent Labs      17   0450   RBC  6.30*  5.21   HEMOGLOBIN  17.4  14.5   HEMATOCRIT  51.5  42.9   PLATELETCT  283  204     Recent Labs      17   0450   WBC  12.6*  8.6   NEUTSPOLYS   --   45.60   LYMPHOCYTES   --   33.10   MONOCYTES   --   10.90   EOSINOPHILS   --   8.20*   BASOPHILS   --   1.20           Hemodynamics:  Temp (24hrs), Av.3 °C (97.4 °F), Min:35.8 °C (96.5 °F), Max:36.7 °C (98.1 °F)  Temperature: 36.1 °C (97 °F)  Pulse  Av.4  Min: 62  Max: 121   Blood Pressure: 126/78 mmHg     Respiratory:    Respiration: 18, Pulse Oximetry: 97 %        RUL Breath Sounds: Clear, RML Breath Sounds: Clear, RLL Breath Sounds: Clear, ALIDA Breath Sounds: Clear, LLL Breath Sounds: Clear  Fluids:  No intake or output data in the 24 hours ending 17 1653     GI/Nutrition:  Orders Placed This Encounter   Procedures   • DIET ORDER     Standing Status: Standing      Number of Occurrences: 1      Standing Expiration Date:      Order Specific Question:  Diet:     Answer:  Regular [1]     Medical Decision Making, by Problem:  Active Hospital Problems    Diagnosis   • *Sepsis (CMS-[facility]) [A41.9]  - resolved  Infected seroma , CSF leak s/p I&D lumbar wound, durotomy repair on 3/3   -on vancomycin  , plan is for outpt daptomycin to complete 6  weeks course  Per ID  -awaiting  insurance approval   -wound cx grew strep viridans and coag neg staph     • Headache [R51]  -resolved    * GERD  -pepcid   • Asthma [J45.909]  -stable  -prn bronchodilators     Plan of care reviewed with patient and discussed with nursing staff and            Labs reviewed, Medications reviewed and Radiology images reviewed  Alejandro catheter: No Alejandro            Antibiotics: Treating active infection/contamination beyond 24 hours perioperative coverage

## 2017-03-10 NOTE — PROGRESS NOTES
Pt remains alert and oriented. Tolerating regular diet. IV abx to CECILIA PICC, pt reports itching, benadryl administered with every vanc dose r/t previous rash; rate is also written in MD note to be given over 3 hrs to prevent rash as well as reported by first shift nurse. Pt requiring PO PRN oxy, see MAR. Plan to d/c home on daptomycin. Pt slept during most of shift.

## 2017-03-10 NOTE — PROGRESS NOTES
"Pharmacy Kinetics 36 y.o. male on vancomycin day # 9  3/10/2017    Currently on Vancomycin 1700 mg iv q8hr (04, 12, 20)    Indication for Treatment: Infected post-op seroma with CNS involvement    Pertinent history per medical record: Admitted on 3/2/2017 for post-op wound evaluation. Patient s/p recent microdiskectomy (Dr Calderon) 17. Patient with history of multiple spinal surgery interventions. Patient presented with severe HA, neck pain, and foot drop noted. Delay in LP completion d/t overlying wound infection (risk of bacteria translocation to CSF), ID consulting.     Other antibiotics: None    Allergies: Nkda     Concerns for renal function include BUN/SCr ratio > 20:1 and prolonged course of vancomycin.    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 Abscess is felt unlikely. No epidural abscess identified.    Recent Labs      17   0326  17   0450   WBC  12.6*  8.6   NEUTSPOLYS   --   45.60     Recent Labs      17   0326  17   0450   BUN  14  16   CREATININE  0.69  0.73     Recent Labs      17   1140  17   0450   VANCOTROUGH  15.2  16.7     Intake/Output Summary (Last 24 hours) at 03/10/17 1049  Last data filed at 03/10/17 0600   Gross per 24 hour   Intake   1250 ml   Output      0 ml   Net   1250 ml      Blood pressure 118/71, pulse 89, temperature 36.3 °C (97.4 °F), resp. rate 18, height 1.829 m (6' 0.01\"), weight 89.5 kg (197 lb 5 oz), SpO2 98 %. Temp (24hrs), Av.4 °C (97.5 °F), Min:36.1 °C (97 °F), Max:36.8 °C (98.3 °F)      A/P   1. Vancomycin dose change: " Continue current regimen  2. Next vancomycin level: 2 days (not currently ordered)  3. Goal trough: 18-22 mcg/mL  4. Comments: Patient continues on vanco for suspected post-op CNS infection, clinical status overall unchanged and pending clarification of antibiotic stop dates for discharge planning. No new labs for review today, repeat vanco trough trending toward therapeutic range on 3/9. Will continue current vanco regimen and consider repeat trough in ~2 days to guide further dose adjustments if vanco is to continue. Vanco dose re-entered to reflect slowed infusion time of 3 hours d/t prior infusion reaction.    Pharmacy will continue to follow.     Diamond Bailey, KerlineD

## 2017-03-11 ENCOUNTER — PATIENT OUTREACH (OUTPATIENT)
Dept: HEALTH INFORMATION MANAGEMENT | Facility: OTHER | Age: 37
End: 2017-03-11

## 2017-03-11 NOTE — DISCHARGE PLANNING
EVERETT received a call from [facility], pt's HH and antibiotics are confirmed and ready to begin tomorrow. HH will be through [facility]. EVERETT notified hospitalist RN.

## 2017-03-11 NOTE — DISCHARGE INSTRUCTIONS
Discharge Instructions    Discharged to home by car with relative. Discharged via wheelchair, hospital escort: Yes.  Special equipment needed: Not Applicable    Be sure to schedule a follow-up appointment with your primary care doctor or any specialists as instructed.     Discharge Plan:   Influenza Vaccine Indication: Not indicated: Previously immunized this influenza season and > 8 years of age    I understand that a diet low in cholesterol, fat, and sodium is recommended for good health. Unless I have been given specific instructions below for another diet, I accept this instruction as my diet prescription.   Other diet: as tolerated    Special Instructions: None    · Is patient discharged on Warfarin / Coumadin?   No     · Is patient Post Blood Transfusion?  No    Depression / Suicide Risk    As you are discharged from [facility], it is important to learn how to keep safe from harming yourself.    Recognize the warning signs:  · Abrupt changes in personality, positive or negative- including increase in energy   · Giving away possessions  · Change in eating patterns- significant weight changes-  positive or negative  · Change in sleeping patterns- unable to sleep or sleeping all the time   · Unwillingness or inability to communicate  · Depression  · Unusual sadness, discouragement and loneliness  · Talk of wanting to die  · Neglect of personal appearance   · Rebelliousness- reckless behavior  · Withdrawal from people/activities they love  · Confusion- inability to concentrate     If you or a loved one observes any of these behaviors or has concerns about self-harm, here's what you can do:  · Talk about it- your feelings and reasons for harming yourself  · Remove any means that you might use to hurt yourself (examples: pills, rope, extension cords, firearm)  · Get professional help from the community (Mental Health, Substance Abuse, psychological counseling)  · Do not be alone:Call your Safe  Contact- someone whom you trust who will be there for you.  · Call your local CRISIS HOTLINE 605-9431 or 346-345-5831  · Call your local Children's Mobile Crisis Response Team [place] (952) 010-1595 or www.Wellcoin  · Call the toll free National Suicide Prevention Hotlines   · National Suicide Prevention Lifeline 916-853-VYWC (6174)  · National [facility] Line Network 800-SUICIDE (021-6733)

## 2017-03-11 NOTE — DISCHARGE SUMMARY
DATE OF ADMISSION:  03/02/2017    DATE OF DISCHARGE:  03/10/2017    PRINCIPAL DIAGNOSES:  1.  Infected seroma and cellulitis.  2.  Sepsis.  3.  Gastroesophageal reflux disease.  4.  Asthma.  5.  Left footdrop.    PROCEDURES DONE DURING THIS HOSPITALIZATION:  1.  PICC line placement.  2.  Lumbar wound.  3.  Incision and drainage of lumbar wound with identification of small CSF   leak in the most medial portion of the dura from the previous hemilaminectomy   segment at L4-L5 on the left side.  4.  Intraoperative microscope with microdissection technique by Dr. Calderon on   03/03/2017.    CONSULTANTS:  Christopher Calderon MD, from neurosurgery.  Naina Gomez MD, and   Kellen Amato MD, from infectious disease.    PERTINENT TEST RESULTS ON THIS HOSPITALIZATION:  white blood count 8.6,   hemoglobin 14.5, hematocrit 42.9, platelet count 204.  Sodium 140, potassium   3.9, chloride 104, bicarbonate 26, glucose 117, BUN 16, creatinine 0.73.  CSF   revealed total white blood cell count of 248, total red blood cell count of   20,000, 84% polys.  Total protein .  Glucose in CSF was 60.  Urinalysis   revealed hazy urine, pH was greater than 9, otherwise negative.  CRP was   2.31.  CSF culture was negative.  Urine culture was negative.  Wound culture   grew strep viridans and coag-negative staph species.  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  Abscess is felt   unlikely.  No epidural abscess identified.  Central canal remains patent.    Right paracentral posterior disk bulging again present at L5-S1, transitional   vertebral anatomy with hypoplastic disk at the L5-S1 level.  Chest x-ray   revealed right peripherally inserted catheter with the  projects appropriately   over the superior vena cava.    HOSPITAL COURSE:  The patient is a 36-year-old male who has had 3 back   surgeries and recent L4-L5 lumbar microdiskectomy who presented with back   pain, headache, and neck pain.  He was admitted and evaluated by neurosurgery.    He was started on empiric antibiotics.  He subsequently underwent incision   and drainage of his lumbar wound with identification of a small CSF leak.  He   was treated with IV vancomycin.  He was evaluated by infectious disease who   recommended to complete 6 weeks of IV antibiotics from the date of surgery   through March 14th.  He will be transitioned to daptomycin today.  He will   receive his first dose and if tolerated, he will be discharged home with   outpatient antibiotic therapy through home health.  He is otherwise clinically   stable for discharge.  He will be discharged with the following instructions.    DIET:  Regular.    ACTIVITY:  As tolerated.    DISCHARGE MEDICATIONS:  Daptomycin _____ mg every 24 hours to complete 6 weeks   of therapy as mentioned above; Zofran 4 mg every 4 hours as needed; MiraLAX   17 g daily; albuterol 2 puffs every 6 hours as needed; B12 supplement; Colace   100 mg b.i.d.; fish oil supplement 1000 mg daily; gabapentin 300 mg 3 times a   day; omeprazole 20 mg daily; oxycodone 10 mg every 4 hours as needed for   severe pain; multivitamin supplement; Zanaflex 2 mg 3 times a day as needed;   vitamin D supplement.    The patient is set up to follow up with Dr. Calderon next week.  He will also,   follow up with primary care provider, Brina Champion.    Total time spent preparing for this discharge was 35 minutes.       ____________________________________     MD AGNIESZKA SHARIF / MASOUD    DD:  03/10/2017 16:37:36  DT:  03/11/2017 05:08:39    D#:  [id number]  Job#:  [id number]

## 2017-03-23 ENCOUNTER — HOSPITAL ENCOUNTER (OUTPATIENT)
Facility: MEDICAL CENTER | Age: 37
End: 2017-03-23
Attending: INTERNAL MEDICINE
Payer: OTHER MISCELLANEOUS

## 2017-03-23 LAB
ALBUMIN SERPL BCP-MCNC: 4.7 G/DL (ref 3.2–4.9)
ALP SERPL-CCNC: 73 U/L (ref 30–99)
ALT SERPL-CCNC: 18 U/L (ref 2–50)
AST SERPL-CCNC: 22 U/L (ref 12–45)
BASOPHILS # BLD AUTO: 0.07 K/UL (ref 0–0.12)
BASOPHILS NFR BLD AUTO: 0.7 % (ref 0–1.8)
BILIRUB CONJ SERPL-MCNC: 0.1 MG/DL (ref 0.1–0.5)
BILIRUB INDIRECT SERPL-MCNC: 1 MG/DL (ref 0–1)
BILIRUB SERPL-MCNC: 1.1 MG/DL (ref 0.1–1.5)
BUN SERPL-MCNC: 11 MG/DL (ref 8–22)
CK SERPL-CCNC: 65 U/L (ref 0–154)
CREAT SERPL-MCNC: 0.94 MG/DL (ref 0.5–1.4)
EOSINOPHIL # BLD: 0.18 K/UL (ref 0–0.51)
EOSINOPHIL NFR BLD AUTO: 1.7 % (ref 0–6.9)
ERYTHROCYTE [DISTWIDTH] IN BLOOD BY AUTOMATED COUNT: 36.8 FL (ref 35.9–50)
HCT VFR BLD AUTO: 51.3 % (ref 42–52)
HGB BLD-MCNC: 17.7 G/DL (ref 14–18)
IMM GRANULOCYTES # BLD AUTO: 0.03 K/UL (ref 0–0.11)
IMM GRANULOCYTES NFR BLD AUTO: 0.3 % (ref 0–0.9)
LYMPHOCYTES # BLD: 2.84 K/UL (ref 1–4.8)
LYMPHOCYTES NFR BLD AUTO: 27.5 % (ref 22–41)
MCH RBC QN AUTO: 27.8 PG (ref 27–33)
MCHC RBC AUTO-ENTMCNC: 34.5 G/DL (ref 33.7–35.3)
MCV RBC AUTO: 80.5 FL (ref 81.4–97.8)
MONOCYTES # BLD: 0.83 K/UL (ref 0–0.85)
MONOCYTES NFR BLD AUTO: 8.1 % (ref 0–13.4)
NEUTROPHILS # BLD: 6.36 K/UL (ref 1.82–7.42)
NEUTROPHILS NFR BLD AUTO: 61.7 % (ref 44–72)
NRBC # BLD AUTO: 0 K/UL
NRBC BLD-RTO: 0 /100 WBC
PLATELET # BLD AUTO: 557 K/UL (ref 164–446)
PMV BLD AUTO: 9.2 FL (ref 9–12.9)
PROT SERPL-MCNC: 7.7 G/DL (ref 6–8.2)
RBC # BLD AUTO: 6.37 M/UL (ref 4.7–6.1)
WBC # BLD AUTO: 10.3 K/UL (ref 4.8–10.8)

## 2017-03-23 PROCEDURE — 82565 ASSAY OF CREATININE: CPT

## 2017-03-23 PROCEDURE — 36415 COLL VENOUS BLD VENIPUNCTURE: CPT

## 2017-03-23 PROCEDURE — 84520 ASSAY OF UREA NITROGEN: CPT

## 2017-03-23 PROCEDURE — 82550 ASSAY OF CK (CPK): CPT

## 2017-03-23 PROCEDURE — 85025 COMPLETE CBC W/AUTO DIFF WBC: CPT

## 2017-03-23 PROCEDURE — 80076 HEPATIC FUNCTION PANEL: CPT

## 2017-03-29 ENCOUNTER — HOSPITAL ENCOUNTER (OUTPATIENT)
Facility: MEDICAL CENTER | Age: 37
End: 2017-03-29
Attending: INTERNAL MEDICINE
Payer: OTHER MISCELLANEOUS

## 2017-03-29 LAB
ALBUMIN SERPL BCP-MCNC: 4.4 G/DL (ref 3.2–4.9)
ALP SERPL-CCNC: 59 U/L (ref 30–99)
ALT SERPL-CCNC: 28 U/L (ref 2–50)
AST SERPL-CCNC: 24 U/L (ref 12–45)
BASOPHILS # BLD AUTO: 0.08 K/UL (ref 0–0.12)
BASOPHILS NFR BLD AUTO: 0.7 % (ref 0–1.8)
BILIRUB CONJ SERPL-MCNC: 0.2 MG/DL (ref 0.1–0.5)
BILIRUB INDIRECT SERPL-MCNC: 0.5 MG/DL (ref 0–1)
BILIRUB SERPL-MCNC: 0.7 MG/DL (ref 0.1–1.5)
BUN SERPL-MCNC: 10 MG/DL (ref 8–22)
CALCIUM SERPL-MCNC: 9.2 MG/DL (ref 8.5–10.5)
CHLORIDE SERPL-SCNC: 107 MMOL/L (ref 96–112)
CK SERPL-CCNC: 108 U/L (ref 0–154)
CO2 SERPL-SCNC: 26 MMOL/L (ref 20–33)
CREAT SERPL-MCNC: 0.78 MG/DL (ref 0.5–1.4)
EOSINOPHIL # BLD: 0.45 K/UL (ref 0–0.51)
EOSINOPHIL NFR BLD AUTO: 4.1 % (ref 0–6.9)
ERYTHROCYTE [DISTWIDTH] IN BLOOD BY AUTOMATED COUNT: 38 FL (ref 35.9–50)
GLUCOSE SERPL-MCNC: 70 MG/DL (ref 65–99)
HCT VFR BLD AUTO: 46 % (ref 42–52)
HGB BLD-MCNC: 15.9 G/DL (ref 14–18)
IMM GRANULOCYTES # BLD AUTO: 0.03 K/UL (ref 0–0.11)
IMM GRANULOCYTES NFR BLD AUTO: 0.3 % (ref 0–0.9)
LYMPHOCYTES # BLD: 1.75 K/UL (ref 1–4.8)
LYMPHOCYTES NFR BLD AUTO: 15.8 % (ref 22–41)
MCH RBC QN AUTO: 28 PG (ref 27–33)
MCHC RBC AUTO-ENTMCNC: 34.6 G/DL (ref 33.7–35.3)
MCV RBC AUTO: 81 FL (ref 81.4–97.8)
MONOCYTES # BLD: 0.71 K/UL (ref 0–0.85)
MONOCYTES NFR BLD AUTO: 6.4 % (ref 0–13.4)
NEUTROPHILS # BLD: 8.05 K/UL (ref 1.82–7.42)
NEUTROPHILS NFR BLD AUTO: 72.7 % (ref 44–72)
NRBC # BLD AUTO: 0 K/UL
NRBC BLD-RTO: 0 /100 WBC
PHOSPHATE SERPL-MCNC: 2.3 MG/DL (ref 2.5–4.5)
PLATELET # BLD AUTO: 326 K/UL (ref 164–446)
PMV BLD AUTO: 9.3 FL (ref 9–12.9)
POTASSIUM SERPL-SCNC: 3.4 MMOL/L (ref 3.6–5.5)
PROT SERPL-MCNC: 6.6 G/DL (ref 6–8.2)
RBC # BLD AUTO: 5.68 M/UL (ref 4.7–6.1)
SODIUM SERPL-SCNC: 144 MMOL/L (ref 135–145)
WBC # BLD AUTO: 11.1 K/UL (ref 4.8–10.8)

## 2017-03-29 PROCEDURE — 80069 RENAL FUNCTION PANEL: CPT

## 2017-03-29 PROCEDURE — 82550 ASSAY OF CK (CPK): CPT

## 2017-03-29 PROCEDURE — 80076 HEPATIC FUNCTION PANEL: CPT

## 2017-03-29 PROCEDURE — 85025 COMPLETE CBC W/AUTO DIFF WBC: CPT

## 2017-04-06 ENCOUNTER — TELEPHONE (OUTPATIENT)
Dept: INFECTIOUS DISEASES | Facility: MEDICAL CENTER | Age: 37
End: 2017-04-06

## 2017-04-06 PROCEDURE — 302449 STATCHG TRIAGE ONLY (STATISTIC)

## 2017-04-06 ASSESSMENT — PAIN SCALES - GENERAL: PAINLEVEL_OUTOF10: 8

## 2017-04-06 NOTE — TELEPHONE ENCOUNTER
Pt's wife, Amairani, called to reschedule pt.  He was a NS on 3/29 d/t issues with them recently becoming homeless.  Amairani says she has been able to keep up with the IV abx and getting HH to change dressings and draw labs.  However, within the past week she has noticed the pt becoming more forgetful, not pay attention as much and is often confused.  Unsure if he has had any fevers or chills.  Recommended they go to the ER for further work up.  FU with ID rescheduled for 4/10 @ 1048 with Dr. Fernández, pending the pt is not admitted.

## 2017-04-07 ENCOUNTER — APPOINTMENT (OUTPATIENT)
Dept: RADIOLOGY | Facility: MEDICAL CENTER | Age: 37
End: 2017-04-07
Attending: EMERGENCY MEDICINE
Payer: OTHER MISCELLANEOUS

## 2017-04-07 ENCOUNTER — HOSPITAL ENCOUNTER (EMERGENCY)
Facility: MEDICAL CENTER | Age: 37
End: 2017-04-07
Payer: MEDICAID

## 2017-04-07 ENCOUNTER — HOSPITAL ENCOUNTER (EMERGENCY)
Facility: MEDICAL CENTER | Age: 37
End: 2017-04-07
Attending: EMERGENCY MEDICINE
Payer: OTHER MISCELLANEOUS

## 2017-04-07 ENCOUNTER — HOSPITAL ENCOUNTER (OUTPATIENT)
Facility: MEDICAL CENTER | Age: 37
End: 2017-04-09
Attending: EMERGENCY MEDICINE | Admitting: HOSPITALIST
Payer: OTHER MISCELLANEOUS

## 2017-04-07 VITALS
HEIGHT: 72 IN | DIASTOLIC BLOOD PRESSURE: 90 MMHG | RESPIRATION RATE: 26 BRPM | BODY MASS INDEX: 26.87 KG/M2 | WEIGHT: 198.41 LBS | HEART RATE: 95 BPM | OXYGEN SATURATION: 100 % | SYSTOLIC BLOOD PRESSURE: 123 MMHG | TEMPERATURE: 97.9 F

## 2017-04-07 VITALS
HEART RATE: 104 BPM | HEIGHT: 72 IN | BODY MASS INDEX: 26.13 KG/M2 | TEMPERATURE: 97 F | SYSTOLIC BLOOD PRESSURE: 122 MMHG | WEIGHT: 192.9 LBS | RESPIRATION RATE: 16 BRPM | DIASTOLIC BLOOD PRESSURE: 88 MMHG | OXYGEN SATURATION: 94 %

## 2017-04-07 DIAGNOSIS — M54.42 ACUTE LEFT-SIDED LOW BACK PAIN WITH LEFT-SIDED SCIATICA: ICD-10-CM

## 2017-04-07 DIAGNOSIS — F19.10 POLYSUBSTANCE ABUSE (HCC): ICD-10-CM

## 2017-04-07 DIAGNOSIS — R41.82 ALTERED MENTAL STATUS, UNSPECIFIED ALTERED MENTAL STATUS TYPE: ICD-10-CM

## 2017-04-07 LAB
ALBUMIN SERPL BCP-MCNC: 4.3 G/DL (ref 3.2–4.9)
ALBUMIN/GLOB SERPL: 1.7 G/DL
ALP SERPL-CCNC: 53 U/L (ref 30–99)
ALT SERPL-CCNC: 22 U/L (ref 2–50)
AMMONIA PLAS-SCNC: 23 UMOL/L (ref 11–45)
AMPHETAMINES UR QL: POSITIVE
ANION GAP SERPL CALC-SCNC: 9 MMOL/L (ref 0–11.9)
APPEARANCE UR: CLEAR
AST SERPL-CCNC: 21 U/L (ref 12–45)
BARBITURATES UR QL SCN: NEGATIVE
BASOPHILS # BLD AUTO: 0.8 % (ref 0–1.8)
BASOPHILS # BLD: 0.07 K/UL (ref 0–0.12)
BENZODIAZ UR QL SCN: POSITIVE
BILIRUB SERPL-MCNC: 1 MG/DL (ref 0.1–1.5)
BILIRUB UR QL STRIP.AUTO: NEGATIVE
BUN SERPL-MCNC: 14 MG/DL (ref 8–22)
BZE UR QL SCN: NEGATIVE
CALCIUM SERPL-MCNC: 9 MG/DL (ref 8.4–10.2)
CHLORIDE SERPL-SCNC: 106 MMOL/L (ref 96–112)
CO2 SERPL-SCNC: 22 MMOL/L (ref 20–33)
COLOR UR: YELLOW
CREAT SERPL-MCNC: 0.97 MG/DL (ref 0.5–1.4)
EOSINOPHIL # BLD AUTO: 0.22 K/UL (ref 0–0.51)
EOSINOPHIL NFR BLD: 2.6 % (ref 0–6.9)
ERYTHROCYTE [DISTWIDTH] IN BLOOD BY AUTOMATED COUNT: 37.3 FL (ref 35.9–50)
ERYTHROCYTE [SEDIMENTATION RATE] IN BLOOD BY WESTERGREN METHOD: 0 MM/HOUR (ref 0–15)
GFR SERPL CREATININE-BSD FRML MDRD: >60 ML/MIN/1.73 M 2
GLOBULIN SER CALC-MCNC: 2.5 G/DL (ref 1.9–3.5)
GLUCOSE SERPL-MCNC: 86 MG/DL (ref 65–99)
GLUCOSE UR STRIP.AUTO-MCNC: NEGATIVE MG/DL
HCT VFR BLD AUTO: 47.5 % (ref 42–52)
HGB BLD-MCNC: 16.6 G/DL (ref 14–18)
IMM GRANULOCYTES # BLD AUTO: 0.02 K/UL (ref 0–0.11)
IMM GRANULOCYTES NFR BLD AUTO: 0.2 % (ref 0–0.9)
KETONES UR STRIP.AUTO-MCNC: 15 MG/DL
LACTATE BLD-SCNC: 1.89 MMOL/L (ref 0.5–2)
LACTATE BLD-SCNC: 2.12 MMOL/L (ref 0.5–2)
LEUKOCYTE ESTERASE UR QL STRIP.AUTO: NEGATIVE
LYMPHOCYTES # BLD AUTO: 3.45 K/UL (ref 1–4.8)
LYMPHOCYTES NFR BLD: 40.1 % (ref 22–41)
MCH RBC QN AUTO: 27.9 PG (ref 27–33)
MCHC RBC AUTO-ENTMCNC: 34.9 G/DL (ref 33.7–35.3)
MCV RBC AUTO: 79.8 FL (ref 81.4–97.8)
MICRO URNS: ABNORMAL
MONOCYTES # BLD AUTO: 0.76 K/UL (ref 0–0.85)
MONOCYTES NFR BLD AUTO: 8.8 % (ref 0–13.4)
NEUTROPHILS # BLD AUTO: 4.09 K/UL (ref 1.82–7.42)
NEUTROPHILS NFR BLD: 47.5 % (ref 44–72)
NITRITE UR QL STRIP.AUTO: NEGATIVE
NRBC # BLD AUTO: 0 K/UL
NRBC BLD AUTO-RTO: 0 /100 WBC
PCP UR QL SCN: NEGATIVE
PH UR STRIP.AUTO: 7 [PH]
PLATELET # BLD AUTO: 295 K/UL (ref 164–446)
PMV BLD AUTO: 9.1 FL (ref 9–12.9)
POTASSIUM SERPL-SCNC: 4 MMOL/L (ref 3.6–5.5)
PROT SERPL-MCNC: 6.8 G/DL (ref 6–8.2)
PROT UR QL STRIP: NEGATIVE MG/DL
RBC # BLD AUTO: 5.95 M/UL (ref 4.7–6.1)
RBC UR QL AUTO: NEGATIVE
SODIUM SERPL-SCNC: 137 MMOL/L (ref 135–145)
SP GR UR STRIP.AUTO: 1.01
SPECIMEN DRAWN FROM PATIENT: ABNORMAL
SPECIMEN DRAWN FROM PATIENT: NORMAL
UR OPIATES 2659: NEGATIVE
UR THC 2511T: POSITIVE
UR TRICYCLIC 2660: NEGATIVE
WBC # BLD AUTO: 8.6 K/UL (ref 4.8–10.8)

## 2017-04-07 PROCEDURE — 96361 HYDRATE IV INFUSION ADD-ON: CPT

## 2017-04-07 PROCEDURE — 96375 TX/PRO/DX INJ NEW DRUG ADDON: CPT

## 2017-04-07 PROCEDURE — 302128 INFUSION PUMP: Performed by: EMERGENCY MEDICINE

## 2017-04-07 PROCEDURE — 99284 EMERGENCY DEPT VISIT MOD MDM: CPT

## 2017-04-07 PROCEDURE — 94760 N-INVAS EAR/PLS OXIMETRY 1: CPT

## 2017-04-07 PROCEDURE — 71010 DX-CHEST-PORTABLE (1 VIEW): CPT

## 2017-04-07 PROCEDURE — 72158 MRI LUMBAR SPINE W/O & W/DYE: CPT

## 2017-04-07 PROCEDURE — 700111 HCHG RX REV CODE 636 W/ 250 OVERRIDE (IP): Performed by: EMERGENCY MEDICINE

## 2017-04-07 PROCEDURE — 80053 COMPREHEN METABOLIC PANEL: CPT

## 2017-04-07 PROCEDURE — A9579 GAD-BASE MR CONTRAST NOS,1ML: HCPCS | Performed by: EMERGENCY MEDICINE

## 2017-04-07 PROCEDURE — 99285 EMERGENCY DEPT VISIT HI MDM: CPT

## 2017-04-07 PROCEDURE — 85025 COMPLETE CBC W/AUTO DIFF WBC: CPT

## 2017-04-07 PROCEDURE — 72157 MRI CHEST SPINE W/O & W/DYE: CPT

## 2017-04-07 PROCEDURE — 72156 MRI NECK SPINE W/O & W/DYE: CPT

## 2017-04-07 PROCEDURE — 96365 THER/PROPH/DIAG IV INF INIT: CPT

## 2017-04-07 PROCEDURE — 700117 HCHG RX CONTRAST REV CODE 255: Performed by: EMERGENCY MEDICINE

## 2017-04-07 PROCEDURE — 83605 ASSAY OF LACTIC ACID: CPT

## 2017-04-07 PROCEDURE — 85652 RBC SED RATE AUTOMATED: CPT

## 2017-04-07 PROCEDURE — 87040 BLOOD CULTURE FOR BACTERIA: CPT | Mod: 91

## 2017-04-07 PROCEDURE — 87086 URINE CULTURE/COLONY COUNT: CPT

## 2017-04-07 PROCEDURE — 82140 ASSAY OF AMMONIA: CPT

## 2017-04-07 PROCEDURE — 81003 URINALYSIS AUTO W/O SCOPE: CPT

## 2017-04-07 PROCEDURE — 700105 HCHG RX REV CODE 258: Performed by: EMERGENCY MEDICINE

## 2017-04-07 PROCEDURE — 36415 COLL VENOUS BLD VENIPUNCTURE: CPT

## 2017-04-07 PROCEDURE — 80305 DRUG TEST PRSMV DIR OPT OBS: CPT

## 2017-04-07 PROCEDURE — 96376 TX/PRO/DX INJ SAME DRUG ADON: CPT

## 2017-04-07 PROCEDURE — 96374 THER/PROPH/DIAG INJ IV PUSH: CPT

## 2017-04-07 PROCEDURE — 70553 MRI BRAIN STEM W/O & W/DYE: CPT

## 2017-04-07 RX ORDER — SODIUM CHLORIDE 9 MG/ML
INJECTION, SOLUTION INTRAVENOUS CONTINUOUS
Status: DISCONTINUED | OUTPATIENT
Start: 2017-04-07 | End: 2017-04-08

## 2017-04-07 RX ORDER — ONDANSETRON 2 MG/ML
4 INJECTION INTRAMUSCULAR; INTRAVENOUS ONCE
Status: COMPLETED | OUTPATIENT
Start: 2017-04-07 | End: 2017-04-07

## 2017-04-07 RX ORDER — LORAZEPAM 2 MG/ML
1 INJECTION INTRAMUSCULAR ONCE
Status: COMPLETED | OUTPATIENT
Start: 2017-04-07 | End: 2017-04-07

## 2017-04-07 RX ORDER — OXYCODONE HYDROCHLORIDE AND ACETAMINOPHEN 5; 325 MG/1; MG/1
1-2 TABLET ORAL EVERY 4 HOURS PRN
COMMUNITY

## 2017-04-07 RX ORDER — SODIUM CHLORIDE 9 MG/ML
2000 INJECTION, SOLUTION INTRAVENOUS CONTINUOUS
Status: DISCONTINUED | OUTPATIENT
Start: 2017-04-07 | End: 2017-04-07 | Stop reason: HOSPADM

## 2017-04-07 RX ORDER — DIAZEPAM 5 MG/1
5 TABLET ORAL EVERY 6 HOURS PRN
Status: ON HOLD | COMMUNITY
End: 2017-04-09

## 2017-04-07 RX ORDER — GABAPENTIN 300 MG/1
300 CAPSULE ORAL 3 TIMES DAILY PRN
Status: ON HOLD | COMMUNITY
End: 2017-04-09

## 2017-04-07 RX ORDER — DIPHENHYDRAMINE HYDROCHLORIDE 50 MG/ML
50 INJECTION INTRAMUSCULAR; INTRAVENOUS ONCE
Status: COMPLETED | OUTPATIENT
Start: 2017-04-07 | End: 2017-04-07

## 2017-04-07 RX ADMIN — ONDANSETRON 4 MG: 2 INJECTION, SOLUTION INTRAMUSCULAR; INTRAVENOUS at 15:16

## 2017-04-07 RX ADMIN — SODIUM CHLORIDE 2000 ML: 9 INJECTION, SOLUTION INTRAVENOUS at 15:16

## 2017-04-07 RX ADMIN — HYDROMORPHONE HYDROCHLORIDE 1 MG: 1 INJECTION, SOLUTION INTRAMUSCULAR; INTRAVENOUS; SUBCUTANEOUS at 16:56

## 2017-04-07 RX ADMIN — GADODIAMIDE 20 ML: 287 INJECTION INTRAVENOUS at 20:42

## 2017-04-07 RX ADMIN — LORAZEPAM 1 MG: 2 INJECTION INTRAMUSCULAR at 19:58

## 2017-04-07 RX ADMIN — DIPHENHYDRAMINE HYDROCHLORIDE 50 MG: 50 INJECTION, SOLUTION INTRAMUSCULAR; INTRAVENOUS at 15:45

## 2017-04-07 RX ADMIN — VANCOMYCIN HYDROCHLORIDE 2300 MG: 10 INJECTION, POWDER, LYOPHILIZED, FOR SOLUTION INTRAVENOUS at 16:19

## 2017-04-07 RX ADMIN — HYDROMORPHONE HYDROCHLORIDE 1 MG: 1 INJECTION, SOLUTION INTRAMUSCULAR; INTRAVENOUS; SUBCUTANEOUS at 15:16

## 2017-04-07 ASSESSMENT — PAIN SCALES - GENERAL
PAINLEVEL_OUTOF10: 7
PAINLEVEL_OUTOF10: 7
PAINLEVEL_OUTOF10: 8

## 2017-04-07 NOTE — LETTER
[facility], EMERGENCY DEPT   [address], [place] [address]  Phone: Dept: [phone] - Fax:        [facility] Progress Report and Disability Certification  Date of Service: 4/7/2017   No Show:  No  Date / Time of Next Visit:     Claim Information   Patient Name: Rodriguez Kramer  Claim Number:     Employer:    Date of Injury: 12/3/2012     Insurer / TPA: Misc Workers Comp ID / SSN: [id number]    Occupation:  Diagnosis: There were no encounter diagnoses.    Medical Information   Related to Industrial Injury?   ***   Subjective Complaints:      Objective Findings:     Pre-Existing Condition(s):     Assessment:        Status:    Permanent Disability:     Plan:      Diagnostics:      Comments:       Disability Information   Status:      From:     Through:   Restrictions are:     Physical Restrictions   Sitting:    Standing:    Stooping:    Bending:      Squatting:    Walking:    Climbing:    Pushing:      Pulling:    Other:    Reaching Above Shoulder (L):   Reaching Above Shoulder (R):       Reaching Below Shoulder (L):    Reaching Below Shoulder (R):      Not to exceed Weight Limits   Carrying(hrs):   Weight Limit(lb):   Lifting(hrs):   Weight  Limit(lb):     Comments:      Repetitive Actions   Hands: i.e. Fine Manipulations from Grasping:     Feet: i.e. Operating Foot Controls:     Driving / Operate Machinery:     Physician Name: Rodriguez Okeefe Physician Signature:   e-Signature:  , Medical Director   Clinic Name / Location: [facility], EMERGENCY DEPT  [address] [place] [id number] [phone]     Clinic Phone Number: Dept: [phone]   Appointment Time:  Visit Start Time:    Check-In Time:  5:29 PM Visit Discharge Time:    Original-Treating Physician or Chiropractor    Page 2-Insurer/TPA    Page 3-Employer    Page 4-Employee

## 2017-04-07 NOTE — LETTER
[facility], EMERGENCY DEPT   [address] [place] [address]  Phone: Dept: [phone] - Fax:        [facility] Progress Report and Disability Certification  Date of Service: 4/7/2017   No Show:  No  Date / Time of Next Visit:     Claim Information   Patient Name: Rodriguez Kramer  Claim Number:     Employer:    Date of Injury:      Insurer / TPA: Misc Workers Comp ID / SSN: [id number]    Occupation:  Diagnosis: There were no encounter diagnoses.    Medical Information   Related to Industrial Injury?   ***   Subjective Complaints:      Objective Findings:     Pre-Existing Condition(s):     Assessment:        Status:    Permanent Disability:     Plan:      Diagnostics:      Comments:       Disability Information   Status:      From:     Through:   Restrictions are:     Physical Restrictions   Sitting:    Standing:    Stooping:    Bending:      Squatting:    Walking:    Climbing:    Pushing:      Pulling:    Other:    Reaching Above Shoulder (L):   Reaching Above Shoulder (R):       Reaching Below Shoulder (L):    Reaching Below Shoulder (R):      Not to exceed Weight Limits   Carrying(hrs):   Weight Limit(lb):   Lifting(hrs):   Weight  Limit(lb):     Comments:      Repetitive Actions   Hands: i.e. Fine Manipulations from Grasping:     Feet: i.e. Operating Foot Controls:     Driving / Operate Machinery:     Physician Name:   Physician Signature:   e-Signature:  , Medical Director   Clinic Name / Location: [facility], EMERGENCY DEPT  [address] [place] [address] [phone]     Clinic Phone Number: Dept: [phone]   Appointment Time:  Visit Start Time:    Check-In Time:  2:07 PM Visit Discharge Time:    Original-Treating Physician or Chiropractor    Page 2-Insurer/TPA    Page 3-Employer    Page 4-Employee

## 2017-04-07 NOTE — IP AVS SNAPSHOT
Travel Notes Access Code: [email or web address]  Expires: 5/9/2017 12:08 PM    Your email address is not on file at [facility].  Email Addresses are required for you to sign up for Travel Notes, please contact [phone] to verify your personal information and to provide your email address prior to attempting to register for Travel Notes.    Rodriguez Kramer  [address] [place] [address]    Travel Notes  A secure, online tool to manage your health information     [facility] Travel Notes® is a secure, online tool that connects you to your personalized health information from the privacy of your home -- day or night - making it very easy for you to manage your healthcare. Once the activation process is completed, you can even access your medical information using the Travel Notes alysia, which is available for free in the Apple Alysia store or Google Play store.     To learn more about Travel Notes, visit [email or web address]    There are two levels of access available (as shown below):   My Chart Features  [facility] Primary Care Doctor [facility]  Specialists [facility]  Urgent  Care Non-[facility] Primary Care Doctor   Email your healthcare team securely and privately 24/7 X X X    Manage appointments: schedule your next appointment; view details of past/upcoming appointments X      Request prescription refills. X      View recent personal medical records, including lab and immunizations X X X X   View health record, including health history, allergies, medications X X X X   Read reports about your outpatient visits, procedures, consult and ER notes X X X X   See your discharge summary, which is a recap of your hospital and/or ER visit that includes your diagnosis, lab results, and care plan X X  X     How to register for roundCornert:  Once your e-mail address has been verified, follow the following steps to sign up for roundCornert.     1. Go to  [email or web address]  2. Click on the Sign Up Now box, which takes you to the New Member Sign Up page. You will need  to provide the following information:  a. Enter your [facility] Access Code exactly as it appears at the top of this page. (You will not need to use this code after you’ve completed the sign-up process. If you do not sign up before the expiration date, you must request a new code.)   b. Enter your date of birth.   c. Enter your home email address.   d. Click Submit, and follow the next screen’s instructions.  3. Create a PolyRemedy ID. This will be your PolyRemedy login ID and cannot be changed, so think of one that is secure and easy to remember.  4. Create a PolyRemedy password. You can change your password at any time.  5. Enter your Password Reset Question and Answer. This can be used at a later time if you forget your password.   6. Enter your e-mail address. This allows you to receive e-mail notifications when new information is available in PolyRemedy.  7. Click Sign Up. You can now view your health information.    For assistance activating your PolyRem[facility] account, call [phone]

## 2017-04-07 NOTE — LETTER
[facility], EMERGENCY DEPT   [address] [place] [address]  Phone: Dept: [phone] - Fax:        [facility] Progress Report and Disability Certification  Date of Service: 4/7/2017   No Show:  No  Date / Time of Next Visit:     Claim Information   Patient Name: Rodriguez Kramer  Claim Number:     Employer:    Date of Injury: 12/3/2012     Insurer / TPA: Misc Workers Comp ID / SSN: [id number]    Occupation:  Diagnosis: Diagnoses of Altered mental status, unspecified altered mental status type and Acute left-sided low back pain with left-sided sciatica were pertinent to this visit.    Medical Information   Related to Industrial Injury?   ***   Subjective Complaints:      Objective Findings:     Pre-Existing Condition(s):     Assessment:        Status:    Permanent Disability:     Plan:      Diagnostics:      Comments:       Disability Information   Status:      From:     Through:   Restrictions are:     Physical Restrictions   Sitting:    Standing:    Stooping:    Bending:      Squatting:    Walking:    Climbing:    Pushing:      Pulling:    Other:    Reaching Above Shoulder (L):   Reaching Above Shoulder (R):       Reaching Below Shoulder (L):    Reaching Below Shoulder (R):      Not to exceed Weight Limits   Carrying(hrs):   Weight Limit(lb):   Lifting(hrs):   Weight  Limit(lb):     Comments:      Repetitive Actions   Hands: i.e. Fine Manipulations from Grasping:     Feet: i.e. Operating Foot Controls:     Driving / Operate Machinery:     Physician Name: Ricardo Shore Physician Signature:   e-Signature:  , Medical Director   Clinic Name / Location: [facility], EMERGENCY DEPT  [address] [place] [address]  [phone]     Clinic Phone Number: Dept: [phone]   Appointment Time:  Visit Start Time:    Check-In Time:  2:07 PM Visit Discharge Time:       Original-Treating Physician or Chiropractor    Page 2-Insurer/TPA    Page 3-Employer    Page 4-Employee

## 2017-04-07 NOTE — LETTER
[facility], EMERGENCY DEPT   [address] [address]  Phone: Dept: [phone] - Fax:        [facility] Progress Report and Disability Certification  Date of Service: 4/7/2017   No Show:  No  Date / Time of Next Visit:     Claim Information   Patient Name: Rodriguez Kramer  Claim Number:     Employer: [employer] Date of Injury: 12/3/2012     Insurer / TPA: [employer] ID / SSN:   Occupation:  Diagnosis: Diagnoses of Altered mental status, unspecified altered mental status type and Acute left-sided low back pain with left-sided sciatica were pertinent to this visit.    Medical Information   Related to Industrial Injury? Yes ***   Subjective Complaints:  Altered mental state increasing pain in his back   Objective Findings: Patient is slightly altered at this time, concern for encephalopathy versus epidural abscess versus infection versus other causes   Pre-Existing Condition(s):     Assessment:   Initial Visit    Status: Additional Care Required  Permanent Disability:  Comments:unable to determine    Plan: Transfer Care    Diagnostics: X-rayLaboratory    Comments:  Patient will require further evaluation. The patient's transfer from this ER to the [facility] ER on no [address].    Disability Information   Status:   Comments:unable to determine    From:     Through:   Restrictions are:     Physical Restrictions   Sitting:    Standing:    Stooping:    Bending:      Squatting:    Walking:    Climbing:    Pushing:      Pulling:    Other:    Reaching Above Shoulder (L):   Reaching Above Shoulder (R):       Reaching Below Shoulder (L):    Reaching Below Shoulder (R):      Not to exceed Weight Limits   Carrying(hrs):   Weight Limit(lb):   Lifting(hrs):   Weight  Limit(lb):     Comments:      Repetitive Actions   Hands: i.e. Fine Manipulations from Grasping:     Feet: i.e. Operating Foot Controls:     Driving / Operate  Machinery:     Physician Name: Ricardo Shore Physician Signature: [email or web address]-RICARDO Barragan M.D. e-Signature:  , Medical Director   Clinic Name / Location: [facility], EMERGENCY DEPT  [address]  [place] [address]  [phone]     Clinic Phone Number: Dept: [phone]   Appointment Time:  Visit Start Time:    Check-In Time:  2:07 PM Visit Discharge Time: 5:11 PM   Original-Treating Physician or Chiropractor    Page 2-Insurer/TPA    Page 3-Employer    Page 4-Employee

## 2017-04-07 NOTE — LETTER
[facility], EMERGENCY DEPT   [address] [address]  Phone: Dept: [phone] - Fax:        [facility] Progress Report and Disability Certification  Date of Service: 4/7/2017   No Show:  No  Date / Time of Next Visit:     Claim Information   Patient Name: Rodriguez Kramer  Claim Number:     Employer:    Date of Injury: 12/3/2012     Insurer / TPA: [employer] Workers Comp ID / SSN: [id number]    Occupation:  Diagnosis: Diagnoses of Altered mental status, unspecified altered mental status type and Acute left-sided low back pain with left-sided sciatica were pertinent to this visit.    Medical Information   Related to Industrial Injury?   ***   Subjective Complaints:      Objective Findings:     Pre-Existing Condition(s):     Assessment:        Status:    Permanent Disability:     Plan:      Diagnostics:      Comments:       Disability Information   Status:      From:     Through:   Restrictions are:     Physical Restrictions   Sitting:    Standing:    Stooping:    Bending:      Squatting:    Walking:    Climbing:    Pushing:      Pulling:    Other:    Reaching Above Shoulder (L):   Reaching Above Shoulder (R):       Reaching Below Shoulder (L):    Reaching Below Shoulder (R):      Not to exceed Weight Limits   Carrying(hrs):   Weight Limit(lb):   Lifting(hrs):   Weight  Limit(lb):     Comments:      Repetitive Actions   Hands: i.e. Fine Manipulations from Grasping:     Feet: i.e. Operating Foot Controls:     Driving / Operate Machinery:     Physician Name: Ricardo Shore Physician Signature:   e-Signature:  , Medical Director   Clinic Name / Location: [facility], EMERGENCY DEPT  [address] [place] [address]  [phone]     Clinic Phone Number: Dept: [phone]   Appointment Time:  Visit Start Time:    Check-In Time:  2:07 PM Visit Discharge Time:       Original-Treating Physician or Chiropractor    Page 2-Insurer/TPA    Page 3-Employer    Page 4-Employee

## 2017-04-07 NOTE — LETTER
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    Medical Information   Related to Industrial Injury?   ***   Subjective Complaints:      Objective Findings:     Pre-Existing Condition(s):     Assessment:        Status:    Permanent Disability:     Plan:      Diagnostics:      Comments:       Disability Information   Status:      From:     Through:   Restrictions are:     Physical Restrictions   Sitting:    Standing:    Stooping:    Bending:      Squatting:    Walking:    Climbing:    Pushing:      Pulling:    Other:    Reaching Above Shoulder (L):   Reaching Above Shoulder (R):       Reaching Below Shoulder (L):    Reaching Below Shoulder (R):      Not to exceed Weight Limits   Carrying(hrs):   Weight Limit(lb):   Lifting(hrs):   Weight  Limit(lb):     Comments:      Repetitive Actions   Hands: i.e. Fine Manipulations from Grasping:     Feet: i.e. Operating Foot Controls:     Driving / Operate Machinery:     Physician Name: Rodriguez Okeefe Physician Signature:   e-Signature:  , Medical Director   Clinic Name / Location: [facility], EMERGENCY DEPT  [address] [place] [address] [phone]     Clinic Phone Number: Dept: [phone]   Appointment Time:  Visit Start Time:    Check-In Time:  5:29 PM Visit Discharge Time:    Original-Treating Physician or Chiropractor    Page 2-Insurer/TPA    Page 3-Employer    Page 4-Employee

## 2017-04-07 NOTE — LETTER
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    Medical Information   Related to Industrial Injury?   ***   Subjective Complaints:      Objective Findings:     Pre-Existing Condition(s):     Assessment:        Status:    Permanent Disability:     Plan:      Diagnostics:      Comments:       Disability Information   Status:      From:     Through:   Restrictions are:     Physical Restrictions   Sitting:    Standing:    Stooping:    Bending:      Squatting:    Walking:    Climbing:    Pushing:      Pulling:    Other:    Reaching Above Shoulder (L):   Reaching Above Shoulder (R):       Reaching Below Shoulder (L):    Reaching Below Shoulder (R):      Not to exceed Weight Limits   Carrying(hrs):   Weight Limit(lb):   Lifting(hrs):   Weight  Limit(lb):     Comments:      Repetitive Actions   Hands: i.e. Fine Manipulations from Grasping:     Feet: i.e. Operating Foot Controls:     Driving / Operate Machinery:     Physician Name: Ricardo Shore Physician Signature:   e-Signature:  , Medical Director   Clinic Name / Location: [facility], EMERGENCY DEPT  [address] [place] [address]  [phone]     Clinic Phone Number: Dept: [phone]   Appointment Time:  Visit Start Time:    Check-In Time:  2:07 PM Visit Discharge Time: 5:11 PM      Original-Treating Physician or Chiropractor    Page 2-Insurer/TPA    Page 3-Employer    Page 4-Employee

## 2017-04-07 NOTE — ED NOTES
Pt tearful, anxious. Pt requested and received pain medication. Family at bedside, aware of and agreeable to POC.

## 2017-04-07 NOTE — LETTER
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    Medical Information   Related to Industrial Injury?   ***   Subjective Complaints:      Objective Findings:     Pre-Existing Condition(s):     Assessment:        Status:    Permanent Disability:     Plan:      Diagnostics:      Comments:       Disability Information   Status:      From:     Through:   Restrictions are:     Physical Restrictions   Sitting:    Standing:    Stooping:    Bending:      Squatting:    Walking:    Climbing:    Pushing:      Pulling:    Other:    Reaching Above Shoulder (L):   Reaching Above Shoulder (R):       Reaching Below Shoulder (L):    Reaching Below Shoulder (R):      Not to exceed Weight Limits   Carrying(hrs):   Weight Limit(lb):   Lifting(hrs):   Weight  Limit(lb):     Comments:      Repetitive Actions   Hands: i.e. Fine Manipulations from Grasping:     Feet: i.e. Operating Foot Controls:     Driving / Operate Machinery:     Physician Name: Ricardo Shore Physician Signature:   e-Signature:  , Medical Director   Clinic Name / Location: [facility], EMERGENCY DEPT  [address] [place] [id number] [phone]     Clinic Phone Number: Dept: [phone]   Appointment Time:  Visit Start Time:    Check-In Time:  2:07 PM Visit Discharge Time:    Original-Treating Physician or Chiropractor    Page 2-Insurer/TPA    Page 3-Employer    Page 4-Employee

## 2017-04-07 NOTE — LETTER
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    Medical Information   Related to Industrial Injury?   ***   Subjective Complaints:      Objective Findings:     Pre-Existing Condition(s):     Assessment:        Status:    Permanent Disability:     Plan:      Diagnostics:      Comments:       Disability Information   Status:      From:     Through:   Restrictions are:     Physical Restrictions   Sitting:    Standing:    Stooping:    Bending:      Squatting:    Walking:    Climbing:    Pushing:      Pulling:    Other:    Reaching Above Shoulder (L):   Reaching Above Shoulder (R):       Reaching Below Shoulder (L):    Reaching Below Shoulder (R):      Not to exceed Weight Limits   Carrying(hrs):   Weight Limit(lb):   Lifting(hrs):   Weight  Limit(lb):     Comments:      Repetitive Actions   Hands: i.e. Fine Manipulations from Grasping:     Feet: i.e. Operating Foot Controls:     Driving / Operate Machinery:     Physician Name: Ricardo Shore Physician Signature:   e-Signature:  , Medical Director   Clinic Name / Location: [facility], EMERGENCY DEPT  [address] [place] [address]  [phone]     Clinic Phone Number: Dept: [phone]   Appointment Time:  Visit Start Time:    Check-In Time:  2:07 PM Visit Discharge Time: 5:11 PM      Original-Treating Physician or Chiropractor    Page 2-Insurer/TPA    Page 3-Employer    Page 4-Employee

## 2017-04-07 NOTE — LETTER
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    Medical Information   Related to Industrial Injury?   ***   Subjective Complaints:      Objective Findings:     Pre-Existing Condition(s):     Assessment:        Status:    Permanent Disability:     Plan:      Diagnostics:      Comments:       Disability Information   Status:      From:     Through:   Restrictions are:     Physical Restrictions   Sitting:    Standing:    Stooping:    Bending:      Squatting:    Walking:    Climbing:    Pushing:      Pulling:    Other:    Reaching Above Shoulder (L):   Reaching Above Shoulder (R):       Reaching Below Shoulder (L):    Reaching Below Shoulder (R):      Not to exceed Weight Limits   Carrying(hrs):   Weight Limit(lb):   Lifting(hrs):   Weight  Limit(lb):     Comments:      Repetitive Actions   Hands: i.e. Fine Manipulations from Grasping:     Feet: i.e. Operating Foot Controls:     Driving / Operate Machinery:     Physician Name: Ricardo Shore Physician Signature:   e-Signature:  , Medical Director   Clinic Name / Location: [facility], EMERGENCY DEPT  [address] [place] [address]  [phone]     Clinic Phone Number: Dept: [phone]   Appointment Time:  Visit Start Time:    Check-In Time:  2:07 PM Visit Discharge Time:       Original-Treating Physician or Chiropractor    Page 2-Insurer/TPA    Page 3-Employer    Page 4-Employee

## 2017-04-07 NOTE — LETTER
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    Medical Information   Related to Industrial Injury?   ***   Subjective Complaints:      Objective Findings:     Pre-Existing Condition(s):     Assessment:        Status:    Permanent Disability:     Plan:      Diagnostics:      Comments:       Disability Information   Status:      From:     Through:   Restrictions are:     Physical Restrictions   Sitting:    Standing:    Stooping:    Bending:      Squatting:    Walking:    Climbing:    Pushing:      Pulling:    Other:    Reaching Above Shoulder (L):   Reaching Above Shoulder (R):       Reaching Below Shoulder (L):    Reaching Below Shoulder (R):      Not to exceed Weight Limits   Carrying(hrs):   Weight Limit(lb):   Lifting(hrs):   Weight  Limit(lb):     Comments:      Repetitive Actions   Hands: i.e. Fine Manipulations from Grasping:     Feet: i.e. Operating Foot Controls:     Driving / Operate Machinery:     Physician Name: Ricardo Shore Physician Signature:   e-Signature:  , Medical Director   Clinic Name / Location: [facility], EMERGENCY DEPT  [address] [place] [address]  [phone]     Clinic Phone Number: Dept: [phone]   Appointment Time:  Visit Start Time:    Check-In Time:  2:07 PM Visit Discharge Time: 5:11 PM     Original-Treating Physician or Chiropractor    Page 2-Insurer/TPA    Page 3-Employer    Page 4-Employee

## 2017-04-07 NOTE — LETTER
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    Medical Information   Related to Industrial Injury?   ***   Subjective Complaints:      Objective Findings:     Pre-Existing Condition(s):     Assessment:        Status:    Permanent Disability:     Plan:      Diagnostics:      Comments:       Disability Information   Status:      From:     Through:   Restrictions are:     Physical Restrictions   Sitting:    Standing:    Stooping:    Bending:      Squatting:    Walking:    Climbing:    Pushing:      Pulling:    Other:    Reaching Above Shoulder (L):   Reaching Above Shoulder (R):       Reaching Below Shoulder (L):    Reaching Below Shoulder (R):      Not to exceed Weight Limits   Carrying(hrs):   Weight Limit(lb):   Lifting(hrs):   Weight  Limit(lb):     Comments:      Repetitive Actions   Hands: i.e. Fine Manipulations from Grasping:     Feet: i.e. Operating Foot Controls:     Driving / Operate Machinery:     Physician Name: Ricardo Shore Physician Signature:   e-Signature:  , Medical Director   Clinic Name / Location: [facility], EMERGENCY DEPT  [address] [place] [address]  [phone]     Clinic Phone Number: Dept: [phone]   Appointment Time:  Visit Start Time:    Check-In Time:  2:07 PM Visit Discharge Time:       Original-Treating Physician or Chiropractor    Page 2-Insurer/TPA    Page 3-Employer    Page 4-Employee

## 2017-04-07 NOTE — LETTER
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    Medical Information   Related to Industrial Injury?   ***   Subjective Complaints:      Objective Findings:     Pre-Existing Condition(s):     Assessment:        Status:    Permanent Disability:     Plan:      Diagnostics:      Comments:       Disability Information   Status:      From:     Through:   Restrictions are:     Physical Restrictions   Sitting:    Standing:    Stooping:    Bending:      Squatting:    Walking:    Climbing:    Pushing:      Pulling:    Other:    Reaching Above Shoulder (L):   Reaching Above Shoulder (R):       Reaching Below Shoulder (L):    Reaching Below Shoulder (R):      Not to exceed Weight Limits   Carrying(hrs):   Weight Limit(lb):   Lifting(hrs):   Weight  Limit(lb):     Comments:      Repetitive Actions   Hands: i.e. Fine Manipulations from Grasping:     Feet: i.e. Operating Foot Controls:     Driving / Operate Machinery:     Physician Name: Rodriguez Okeefe Physician Signature:   e-Signature:  , Medical Director   Clinic Name / Location: [facility], EMERGENCY DEPT  [address] [place] [id number] [phone]     Clinic Phone Number: Dept: [phone]   Appointment Time:  Visit Start Time:    Check-In Time:  5:29 PM Visit Discharge Time:    Original-Treating Physician or Chiropractor    Page 2-Insurer/TPA    Page 3-Employer    Page 4-Employee

## 2017-04-07 NOTE — IP AVS SNAPSHOT
" <p align=\"LEFT\"><IMG SRC=\"//EMRWB/blob$/Images/[email or web address]\" alt=\"Image\" WIDTH=\"50%\" HEIGHT=\"200\" BORDER=\"\"></p>                   Name:Rodriguez Kramer  Medical Record Number:[id number]  CSN: [id number]    YOB: 1980   Age: 36 y.o.  Sex: male  HT:1.829 m (6') WT: 89.7 kg (197 lb 12 oz)          Admit Date: 4/7/2017     Discharge Date:   Today's Date: 4/9/2017  Attending Doctor:  Mariana Carrasquillo M.D.                  Allergies:  Nkda and Tape          Your appointments     Apr 10, 2017 10:45 AM   Follow Up Visit with Moriah Fernández M.D.   [address])    [address] [place] [id number] [phone]           You will be receiving a confirmation call a few days before your appointment from our automated call confirmation system.              Follow-up Information     1. Follow up with TERRIE Bush. Schedule an appointment as soon as possible for a visit in 2 weeks.    Specialty:  Family Medicine    Contact information    [address] [facility]  [address] [address]  [phone]           Medication List      Take these Medications        Instructions    albuterol 108 (90 BASE) MCG/ACT Aers inhalation aerosol    Inhale 2 Puffs by mouth every 6 hours as needed for Shortness of Breath.   Dose:  2 Puff       gabapentin 300 MG Caps   What changed:  how much to take   Commonly known as:  NEURONTIN    Take 2 Caps by mouth 3 times a day as needed.   Dose:  600 mg       lorazepam 1 MG Tabs   Commonly known as:  ATIVAN    Take 1 Tab by mouth every four hours as needed for Anxiety.   Dose:  1 mg       nicotine 14 MG/24HR Pt24   Commonly known as:  NICODERM    Apply 1 Patch to skin as directed every 24 hours.   Dose:  1 Patch       omeprazole 20 MG delayed-release capsule   Commonly known as:  PRILOSEC    Take 20 mg by mouth every day. Indications: Gastroesophageal Reflux Disease   Dose:  20 mg       oxycodone-acetaminophen 5-325 MG Tabs   Commonly known as:  PERCOCET    Take " 1-2 Tabs by mouth every four hours as needed.   Dose:  1-2 Tab

## 2017-04-07 NOTE — ED NOTES
Urine sample sent to lab, pt assisted to reposition, additional pillow provided. Pt reports slight decrease in pain following medication. Talking with family at bedside.

## 2017-04-07 NOTE — LETTER
[facility], EMERGENCY DEPT   [address] [address]  Phone: Dept: [phone] - Fax:        [facility] Progress Report and Disability Certification  Date of Service: 4/7/2017   No Show:  No  Date / Time of Next Visit:     Claim Information   Patient Name: Rodriguez Kramer  Claim Number:     Employer:    Date of Injury:      Insurer / TPA: Misc Workers Comp ID / SSN: [id number]    Occupation:  Diagnosis: There were no encounter diagnoses.    Medical Information   Related to Industrial Injury?   ***   Subjective Complaints:      Objective Findings:     Pre-Existing Condition(s):     Assessment:        Status:    Permanent Disability:     Plan:      Diagnostics:      Comments:       Disability Information   Status:      From:     Through:   Restrictions are:     Physical Restrictions   Sitting:    Standing:    Stooping:    Bending:      Squatting:    Walking:    Climbing:    Pushing:      Pulling:    Other:    Reaching Above Shoulder (L):   Reaching Above Shoulder (R):       Reaching Below Shoulder (L):    Reaching Below Shoulder (R):      Not to exceed Weight Limits   Carrying(hrs):   Weight Limit(lb):   Lifting(hrs):   Weight  Limit(lb):     Comments:      Repetitive Actions   Hands: i.e. Fine Manipulations from Grasping:     Feet: i.e. Operating Foot Controls:     Driving / Operate Machinery:     Physician Name: Ricardo Shore Physician Signature:   e-Signature:  , Medical Director   Clinic Name / Location: [facility], EMERGENCY DEPT  [address] [place] [id number] [phone]     Clinic Phone Number: Dept: [phone]   Appointment Time:  Visit Start Time:    Check-In Time:  2:07 PM Visit Discharge Time:    Original-Treating Physician or Chiropractor    Page 2-Insurer/TPA    Page 3-Employer    Page 4-Employee

## 2017-04-07 NOTE — LETTER
[facility], EMERGENCY DEPT   [address], [place] [address]  Phone: Dept: [phone] - Fax:        [facility] Progress Report and Disability Certification  Date of Service: 4/7/2017   No Show:  No  Date / Time of Next Visit:     Claim Information   Patient Name: Rodriguez Kramer  Claim Number:     Employer:    Date of Injury: 12/3/2012     Insurer / TPA: Misc Workers Comp ID / SSN: [id number]    Occupation:  Diagnosis: Diagnoses of Altered mental status, unspecified altered mental status type and Polysubstance abuse were pertinent to this visit.    Medical Information   Related to Industrial Injury?   ***   Subjective Complaints:      Objective Findings:     Pre-Existing Condition(s):     Assessment:        Status:    Permanent Disability:     Plan:      Diagnostics:      Comments:       Disability Information   Status:      From:     Through:   Restrictions are:     Physical Restrictions   Sitting:    Standing:    Stooping:    Bending:      Squatting:    Walking:    Climbing:    Pushing:      Pulling:    Other:    Reaching Above Shoulder (L):   Reaching Above Shoulder (R):       Reaching Below Shoulder (L):    Reaching Below Shoulder (R):      Not to exceed Weight Limits   Carrying(hrs):   Weight Limit(lb):   Lifting(hrs):   Weight  Limit(lb):     Comments:      Repetitive Actions   Hands: i.e. Fine Manipulations from Grasping:     Feet: i.e. Operating Foot Controls:     Driving / Operate Machinery:     Physician Name: Rodriguez Okeefe Physician Signature:   e-Signature:  , Medical Director   Clinic Name / Location: [facility], EMERGENCY DEPT  [address] [place] [id number] [phone]     Clinic Phone Number: Dept: [phone]   Appointment Time:  Visit Start Time:    Check-In Time:  5:29 PM Visit Discharge Time:    Original-Treating Physician or Chiropractor    Page 2-Insurer/TPA    Page  3-Employer    Page 4-Employee

## 2017-04-07 NOTE — ED NOTES
1 MONTH POST OP FOR BACK SURGERY, ALSO UNDER TREATMENT FOR INFECTION TO SURGICAL SITE, HAS HAD INCREASING CONFUSION OVER LAST WEEK

## 2017-04-07 NOTE — ED NOTES
"Pt ambulatory to triage with cane. Pt c/o being disoriented since being discharged on 3-10-17 for spinal fluid leak. Pt states, \"my wife says I was putting a pizza in the shower to cook it and I was putting a garbage bag in the bedroom.\" Pt is A&O x3, mildly diaphoretic, reports injecting meth this am. Pt c/o mild headache and chronic low back/ left leg pain. PERR. , other, VSS. Educated on triage process, encouraged to inform staff of any changes.    "

## 2017-04-07 NOTE — LETTER
[facility], EMERGENCY DEPT   [address] [address]  Phone: Dept: [phone] - Fax:        [facility] Progress Report and Disability Certification  Date of Service: 4/7/2017   No Show:  No  Date / Time of Next Visit:     Claim Information   Patient Name: Rodriguez Kramer  Claim Number:     Employer:    Date of Injury: 12/3/2012     Insurer / TPA:  ID / SSN: [id number]    Occupation:  Diagnosis: Diagnoses of Altered mental status, unspecified altered mental status type and Acute left-sided low back pain with left-sided sciatica were pertinent to this visit.    Medical Information   Related to Industrial Injury? Yes ***   Subjective Complaints:  Altered mental state increasing pain in his back   Objective Findings: Patient is slightly altered at this time, concern for encephalopathy versus epidural abscess versus infection versus other causes   Pre-Existing Condition(s):     Assessment:   Initial Visit    Status: Additional Care Required  Permanent Disability:  Comments:unable to determine    Plan: Transfer Care    Diagnostics: X-rayLaboratory    Comments:  Patient will require further evaluation. The patient's transfer from this ER to the [facility] ER on no street.    Disability Information   Status:   Comments:unable to determine    From:     Through:   Restrictions are:     Physical Restrictions   Sitting:    Standing:    Stooping:    Bending:      Squatting:    Walking:    Climbing:    Pushing:      Pulling:    Other:    Reaching Above Shoulder (L):   Reaching Above Shoulder (R):       Reaching Below Shoulder (L):    Reaching Below Shoulder (R):      Not to exceed Weight Limits   Carrying(hrs):   Weight Limit(lb):   Lifting(hrs):   Weight  Limit(lb):     Comments:      Repetitive Actions   Hands: i.e. Fine Manipulations from Grasping:     Feet: i.e. Operating Foot Controls:     Driving / Operate Machinery:     Physician Name:  Ricardo Shore Physician Signature: e-RICARDO Barragan M.D. e-Signature:  , Medical Director   Clinic Name / Location: [facility], EMERGENCY DEPT  [address]  [place] [address]  [phone]     Clinic Phone Number: Dept: [phone]   Appointment Time:  Visit Start Time:    Check-In Time:  2:07 PM Visit Discharge Time: 5:11 PM   Original-Treating Physician or Chiropractor    Page 2-Insurer/TPA    Page 3-Employer    Page 4-Employee

## 2017-04-07 NOTE — LETTER
[facility], EMERGENCY DEPT   [address] [address]  Phone: Dept: [phone] - Fax:        [facility] Progress Report and Disability Certification  Date of Service: 4/7/2017   No Show:  No  Date / Time of Next Visit:     Claim Information   Patient Name: Rodriguez Kramer  Claim Number:     Employer:    Date of Injury: 12/3/2012     Insurer / TPA: Misc Workers Comp ID / SSN:   Occupation:  Diagnosis: Diagnoses of Altered mental status, unspecified altered mental status type and Acute left-sided low back pain with left-sided sciatica were pertinent to this visit.    Medical Information   Related to Industrial Injury?   ***   Subjective Complaints:      Objective Findings:     Pre-Existing Condition(s):     Assessment:        Status:    Permanent Disability:     Plan:      Diagnostics:      Comments:       Disability Information   Status:      From:     Through:   Restrictions are:     Physical Restrictions   Sitting:    Standing:    Stooping:    Bending:      Squatting:    Walking:    Climbing:    Pushing:      Pulling:    Other:    Reaching Above Shoulder (L):   Reaching Above Shoulder (R):       Reaching Below Shoulder (L):    Reaching Below Shoulder (R):      Not to exceed Weight Limits   Carrying(hrs):   Weight Limit(lb):   Lifting(hrs):   Weight  Limit(lb):     Comments:      Repetitive Actions   Hands: i.e. Fine Manipulations from Grasping:     Feet: i.e. Operating Foot Controls:     Driving / Operate Machinery:     Physician Name: Ricardo Shore Physician Signature:   e-Signature:  , Medical Director   Clinic Name / Location: [facility], EMERGENCY DEPT  [address] [place] [address]  [phone]     Clinic Phone Number: Dept: [phone]   Appointment Time:  Visit Start Time:    Check-In Time:  2:07 PM Visit Discharge Time: 5:11 PM      Original-Treating Physician or Chiropractor    Page 2-Insurer/TPA    Page 3-Employer    Page 4-Employee

## 2017-04-07 NOTE — LETTER
[facility] [place] [facility], EMERGENCY DEPT   [address] [address]  Phone: Dept: [phone] - Fax:        [facility] Progress Report and Disability Certification  Date of Service: 4/7/2017   No Show:  No  Date / Time of Next Visit:     Claim Information   Patient Name: Rodriguez Kramer  Claim Number:     Employer:    Date of Injury: 12/3/2012     Insurer / TPA: Misc Workers Comp ID / SSN: [id number]    Occupation:  Diagnosis: Diagnoses of Altered mental status, unspecified altered mental status type and Acute left-sided low back pain with left-sided sciatica were pertinent to this visit.    Medical Information   Related to Industrial Injury?   ***   Subjective Complaints:      Objective Findings:     Pre-Existing Condition(s):     Assessment:        Status:    Permanent Disability:     Plan:      Diagnostics:      Comments:       Disability Information   Status:      From:     Through:   Restrictions are:     Physical Restrictions   Sitting:    Standing:    Stooping:    Bending:      Squatting:    Walking:    Climbing:    Pushing:      Pulling:    Other:    Reaching Above Shoulder (L):   Reaching Above Shoulder (R):       Reaching Below Shoulder (L):    Reaching Below Shoulder (R):      Not to exceed Weight Limits   Carrying(hrs):   Weight Limit(lb):   Lifting(hrs):   Weight  Limit(lb):     Comments:      Repetitive Actions   Hands: i.e. Fine Manipulations from Grasping:     Feet: i.e. Operating Foot Controls:     Driving / Operate Machinery:     Physician Name: Ricardo Shore Physician Signature:   e-Signature:  , Medical Director   Clinic Name / Location: [facility], EMERGENCY DEPT  [address] [place] [address]  [phone]     Clinic Phone Number: Dept: [phone]   Appointment Time:  Visit Start Time:    Check-In Time:  2:07 PM Visit Discharge Time:       Original-Treating Physician or Chiropractor    Page 2-Insurer/TPA    Page 3-Employer    Page 4-Employee

## 2017-04-07 NOTE — IP AVS SNAPSHOT
4/9/2017          Rodriguez Kramer  [address] [place] [address]    Dear Rodriguez:    [facility] wants to ensure your discharge home is safe and you or your loved ones have had all your questions answered regarding your care after you leave the hospital.    You may receive a telephone call within two days of your discharge.  This call is to make certain you understand your discharge instructions as well as ensure we provided you with the best care possible during your stay with us.     The call will only last approximately 3-5 minutes and will be done by a nurse.    Once again, we want to ensure your discharge home is safe and that you have a clear understanding of any next steps in your care.  If you have any questions or concerns, please do not hesitate to contact us, we are here for you.  Thank you for choosing [facility] for your healthcare needs.    Sincerely,    Kirt Rizvi    [facility] [place]

## 2017-04-07 NOTE — LETTER
[facility], EMERGENCY DEPT   [address] [address]  Phone: Dept: [phone] - Fax:        [facility] Progress Report and Disability Certification  Date of Service: 4/7/2017   No Show:  No  Date / Time of Next Visit:     Claim Information   Patient Name: Rodriguez Kramer  Claim Number:     Employer:    Date of Injury: 12/3/2012     Insurer / TPA: Misc Workers Comp ID / SSN: [id number]    Occupation:  Diagnosis: Diagnoses of Altered mental status, unspecified altered mental status type and Acute left-sided low back pain with left-sided sciatica were pertinent to this visit.    Medical Information   Related to Industrial Injury?   ***   Subjective Complaints:      Objective Findings:     Pre-Existing Condition(s):     Assessment:        Status:    Permanent Disability:     Plan:      Diagnostics:      Comments:       Disability Information   Status:      From:     Through:   Restrictions are:     Physical Restrictions   Sitting:    Standing:    Stooping:    Bending:      Squatting:    Walking:    Climbing:    Pushing:      Pulling:    Other:    Reaching Above Shoulder (L):   Reaching Above Shoulder (R):       Reaching Below Shoulder (L):    Reaching Below Shoulder (R):      Not to exceed Weight Limits   Carrying(hrs):   Weight Limit(lb):   Lifting(hrs):   Weight  Limit(lb):     Comments:      Repetitive Actions   Hands: i.e. Fine Manipulations from Grasping:     Feet: i.e. Operating Foot Controls:     Driving / Operate Machinery:     Physician Name: Ricardo Shore Physician Signature:   e-Signature:  , Medical Director   Clinic Name / Location: [facility], EMERGENCY DEPT  [address] [place] [address]  [phone]     Clinic Phone Number: Dept: [phone]   Appointment Time:  Visit Start Time:    Check-In Time:  2:07 PM Visit Discharge Time:       Original-Treating Physician or Chiropractor    Page 2-Insurer/TPA    Page 3-Employer    Page 4-Employee

## 2017-04-07 NOTE — ED PROVIDER NOTES
"ED Provider Note    CHIEF COMPLAINT  Chief Complaint   Patient presents with   • ALOC   • Post-Op Complications        HPI  Rodriguez Kramer is a 36 y.o. male who presents to the ED with increasing altered mental status. Patient in February had apparently a microdiscectomy done by Dr. Calderon then apparently on the 2nd of March had either a seroma or abscess that was then drained. At the time is concerning for the possibility of an infection. The patient was started on antibiotics in Dr. Gomez of infectious disease was consulted. PICC line was placed. The patient has been on continuous antibiotics ever since. According to the wife, for the past couple weeks. The patient is been having increasing pain to the lower back with radiation down the left leg as well as increasing confusion. Over the past 2-3 days. Has gotten worse to where now the patient has put a pizza box in the shower and other confusing events. The patient states he's been having increasing weakness to his left leg. The wife does notice that sometimes patients will decide off the wall, things to the family. According to the family. The family contacted Dr. Amato's office. He recommended that the patient come to the emergency department for immediate evaluation. Upon arrival, the patient is alert to person, place, time, but not exactly all events. He is slightly confused to describing continuous pain to his lower back. This apparently been increasing, but he states \"I can't remember.\". Family denies any fevers or chills, just worsening confusion.    REVIEW OF SYSTEMS  See HPI for further details. All other systems are negative.     PAST MEDICAL HISTORY  Past Medical History   Diagnosis Date   • ASTHMA    • Bulging discs 12/2012       FAMILY HISTORY  No family history on file.  Patient's family history has been discussed and is been found to be noncontributory to his present illness  SOCIAL HISTORY  Social History     Social History   • Marital " Status:      Spouse Name: N/A   • Number of Children: N/A   • Years of Education: N/A     Social History Main Topics   • Smoking status: Current Some Day Smoker -- 0.20 packs/day     Types: Cigarettes   • Smokeless tobacco: Not on file   • Alcohol Use: Yes   • Drug Use: No   • Sexual Activity: Not on file     Other Topics Concern   • Not on file     Social History Narrative      TERRIE Bush      SURGICAL HISTORY  Past Surgical History   Procedure Laterality Date   • Lumbar laminectomy diskectomy  11/25/08     Performed by SHAUNA JENKINS at SUR[facility]   • Other neurological surg       L 4-5 discectomy   • Lumbar laminectomy diskectomy  8/3/2014     Performed by Christopher Calderon M.D. at [facility]   • Lumbar fusion posterior  6/15/2015     Procedure: LUMBAR FUSION POSTERIOR;  Surgeon: Christopher Calderon M.D.;  Location: [facility];  Service:    • Lumbar laminectomy diskectomy Left 2/24/2017     Procedure: LEFT LUMBAR 5 - SACRAL 1 MICRODISKECTOMY;  Surgeon: Christopher Calderon M.D.;  Location: [facility];  Service:    • Incision and drainage neuro  3/3/2017     Procedure: INCISION AND DRAINAGE NEURO-LUMBAR  WOUND, REPAIR OF CSF LEAK;  Surgeon: Christopher Calderon M.D.;  Location: SUR[facility];  Service:        CURRENT MEDICATIONS  Home Medications     Reviewed by Vanita Contreras (Pharmacy Tech) on 04/07/17 at 1533  Med List Status: Complete    Medication Last Dose Status    albuterol 108 (90 BASE) MCG/ACT Aero Soln inhalation aerosol 2 weeks Active    diazepam (VALIUM) 5 MG Tab 4/7/2017 Active    gabapentin (NEURONTIN) 300 MG Cap 4/6/2017 Active    NS SOLN 50 mL with daptomycin 500 MG SOLR 540 mg 4/6/2017 Active    omeprazole (PRILOSEC) 20 MG delayed-release capsule week Active    oxycodone-acetaminophen (PERCOCET) 5-325 MG Tab 4/7/2017 Active              No current facility-administered medications on file prior to encounter.     Current  "Outpatient Prescriptions on File Prior to Encounter   Medication Sig Dispense Refill   • NS SOLN 50 mL with daptomycin 500 MG SOLR 540 mg 540 mg by Intravenous route every 24 hours.     • omeprazole (PRILOSEC) 20 MG delayed-release capsule Take 20 mg by mouth every day. Indications: Gastroesophageal Reflux Disease     • albuterol 108 (90 BASE) MCG/ACT Aero Soln inhalation aerosol Inhale 2 Puffs by mouth every 6 hours as needed for Shortness of Breath.           ALLERGIES  Allergies   Allergen Reactions   • Nkda [No Known Drug Allergy]    • Tape      Certain tape irritates his skin         PHYSICAL EXAM  VITAL SIGNS: /90 mmHg  Pulse 95  Temp(Src) 36.6 °C (97.9 °F)  Resp 26  Ht 1.829 m (6' 0.01\")  Wt 90 kg (198 lb 6.6 oz)  BMI 26.90 kg/m2  SpO2 100%   Pulse Ox interpretation. Nonhypoxic    Constitutional: Well developed, Well nourished, No acute distress, cleaning and pain in his back   Neck: Supple, no cervical lymphadenopathy, no meningeal signs. Good range of motion, nontender, midline  Lymphatic: No lymphadenopathy noted.   Cardiovascular: Regular rate and rhythm without murmurs gallops or rubs.   Thorax & Lungs: Lungs are clear to auscultation bilaterally, there are no wheezes no rales. Chest wall is nontender.  Abdomen: Soft, nontender nondistended. Bowel sounds are present.   Skin: Warm, Dry, No erythema,   Back: Well-healed surgical incision on his posterior lumbar spine. Tender to palpation. The whole area. There is no erythema, no fullness.  Musculoskeletal: Patient has 45 strength in lower extremity does have some foot drop on the left leg, very minimal flexion of his gastrocnemius. Patient has significant amount of pain with movement over his left leg. Right leg and right upper extremities are normal.  Neurologic: Alert & oriented person, place, time, but not exactly events. Patient is having some weakness with flexion of his foot on the left side, but otherwise is neurologically intact except " for his confusion.  Psychiatric: Affect normal, Judgment normal, Mood normal.         RADIOLOGY/PROCEDURES  DX-CHEST-PORTABLE (1 VIEW)   Final Result      1.  No acute cardiopulmonary abnormality identified.      2.  Right PICC line appears appropriately located        Results for orders placed or performed during the hospital encounter of 04/07/17   LACTIC ACID   Result Value Ref Range    Lactic Acid 2.12 (H) 0.50 - 2.00 mmol/L    Specimen Venous    LACTIC ACID   Result Value Ref Range    Lactic Acid 1.89 0.50 - 2.00 mmol/L    Specimen Venous    CBC WITH DIFFERENTIAL   Result Value Ref Range    WBC 8.6 4.8 - 10.8 K/uL    RBC 5.95 4.70 - 6.10 M/uL    Hemoglobin 16.6 14.0 - 18.0 g/dL    Hematocrit 47.5 42.0 - 52.0 %    MCV 79.8 (L) 81.4 - 97.8 fL    MCH 27.9 27.0 - 33.0 pg    MCHC 34.9 33.7 - 35.3 g/dL    RDW 37.3 35.9 - 50.0 fL    Platelet Count 295 164 - 446 K/uL    MPV 9.1 9.0 - 12.9 fL    Neutrophils-Polys 47.50 44.00 - 72.00 %    Lymphocytes 40.10 22.00 - 41.00 %    Monocytes 8.80 0.00 - 13.40 %    Eosinophils 2.60 0.00 - 6.90 %    Basophils 0.80 0.00 - 1.80 %    Immature Granulocytes 0.20 0.00 - 0.90 %    Nucleated RBC 0.00 /100 WBC    Neutrophils (Absolute) 4.09 1.82 - 7.42 K/uL    Lymphs (Absolute) 3.45 1.00 - 4.80 K/uL    Monos (Absolute) 0.76 0.00 - 0.85 K/uL    Eos (Absolute) 0.22 0.00 - 0.51 K/uL    Baso (Absolute) 0.07 0.00 - 0.12 K/uL    Immature Granulocytes (abs) 0.02 0.00 - 0.11 K/uL    NRBC (Absolute) 0.00 K/uL   COMP METABOLIC PANEL   Result Value Ref Range    Sodium 137 135 - 145 mmol/L    Potassium 4.0 3.6 - 5.5 mmol/L    Chloride 106 96 - 112 mmol/L    Co2 22 20 - 33 mmol/L    Anion Gap 9.0 0.0 - 11.9    Glucose 86 65 - 99 mg/dL    Bun 14 8 - 22 mg/dL    Creatinine 0.97 0.50 - 1.40 mg/dL    Calcium 9.0 8.4 - 10.2 mg/dL    AST(SGOT) 21 12 - 45 U/L    ALT(SGPT) 22 2 - 50 U/L    Alkaline Phosphatase 53 30 - 99 U/L    Total Bilirubin 1.0 0.1 - 1.5 mg/dL    Albumin 4.3 3.2 - 4.9 g/dL    Total Protein  6.8 6.0 - 8.2 g/dL    Globulin 2.5 1.9 - 3.5 g/dL    A-G Ratio 1.7 g/dL   URINALYSIS   Result Value Ref Range    Color Yellow     Character Clear     Specific Gravity 1.010 <1.035    Ph 7.0 5.0-8.0    Glucose Negative Negative mg/dL    Ketones 15 (A) Negative mg/dL    Protein Negative Negative mg/dL    Bilirubin Negative Negative    Nitrite Negative Negative    Leukocyte Esterase Negative Negative    Occult Blood Negative Negative    Micro Urine Req see below    WESTERGREN SED RATE   Result Value Ref Range    Sed Rate Westergren 0 0 - 15 mm/hour   ESTIMATED GFR   Result Value Ref Range    GFR If African American >60 >60 mL/min/1.73 m 2    GFR If Non African American >60 >60 mL/min/1.73 m 2   AMMONIA   Result Value Ref Range    Ammonia 23 11 - 45 umol/L   UR DRUG SCREEN([facility] ONLY)   Result Value Ref Range    Phencyclidine -Pcp Negative Negative    Benzodiazepines Positive (A) Negative    Cocaine Metabolite Negative Negative    Amphetamines By Triage Positive (A) Negative    Urine THC Positive (A) Negative    Codeine-Morphine Negative Negative    Barbiturates Negative Negative    Tricyclic Antidepressants Negative Negative           COURSE & MEDICAL DECISION MAKING  Pertinent Labs & Imaging studies reviewed. (See chart for details)  Patient resents the ED for evaluation. Clinically, the patient is slightly confused, but according to the wife, he is much worse at home. Patient is tender about his back. He has a low but weakness to the left leg. They state that that is significantly worse. At this point. After discussed the case with Dr. Gomez who is consulted on the patient. We will start the patient vancomycin empirically, most likely encephalopathy. I have ordered for urine cultures septic protocol as well as urine tox screens. I did discuss with our radiologist here at [facility] and they will not be able to do the MRIs in a timely fashion and Dr. Calderon does not come to [facility]. At this point, I  have initiated to transfer to the patient to go to the [facility]. I spoke with Dr. Carr who has accepted the patient transfer. Recommended to have MRI of the lumbar spine with and without contrast as well as brain with and without contrast and most likely will require admission.    Urine tox screen is come back since the above dictation was done, positive for benzodiazepines and family means as well as THC. At this point is very possible patient may have been using his PICC line for other purposes. As recommended by Dr. Gomez to have the PICC line removed after this admission.    FINAL IMPRESSION  1. Altered mental status, unspecified altered mental status type    2. Acute left-sided low back pain with left-sided sciatica          Electronically signed by: Ricardo Shore, 4/7/2017 3:01 PM

## 2017-04-07 NOTE — LETTER
[facility], EMERGENCY DEPT   [address], [place] [address]  Phone: Dept: [phone] - Fax:        [facility] Progress Report and Disability Certification  Date of Service: 4/7/2017   No Show:  No  Date / Time of Next Visit:     Claim Information   Patient Name: Rodriguez Kramer  Claim Number:     Employer:    Date of Injury: 12/3/2012     Insurer / TPA: Misc Workers Comp ID / SSN: [id number]    Occupation:  Diagnosis: There were no encounter diagnoses.    Medical Information   Related to Industrial Injury?   ***   Subjective Complaints:      Objective Findings:     Pre-Existing Condition(s):     Assessment:        Status:    Permanent Disability:     Plan:      Diagnostics:      Comments:       Disability Information   Status:      From:     Through:   Restrictions are:     Physical Restrictions   Sitting:    Standing:    Stooping:    Bending:      Squatting:    Walking:    Climbing:    Pushing:      Pulling:    Other:    Reaching Above Shoulder (L):   Reaching Above Shoulder (R):       Reaching Below Shoulder (L):    Reaching Below Shoulder (R):      Not to exceed Weight Limits   Carrying(hrs):   Weight Limit(lb):   Lifting(hrs):   Weight  Limit(lb):     Comments:      Repetitive Actions   Hands: i.e. Fine Manipulations from Grasping:     Feet: i.e. Operating Foot Controls:     Driving / Operate Machinery:     Physician Name: Rodriguez Okeefe Physician Signature:   e-Signature:  , Medical Director   Clinic Name / Location: [facility], EMERGENCY DEPT  [address] [place] [id number] [phone]     Clinic Phone Number: Dept: [phone]   Appointment Time:  Visit Start Time:    Check-In Time:  5:29 PM Visit Discharge Time:    Original-Treating Physician or Chiropractor    Page 2-Insurer/TPA    Page 3-Employer    Page 4-Employee

## 2017-04-07 NOTE — LETTER
[facility], EMERGENCY DEPT   [address] [place] [address]  Phone: Dept: [phone] - Fax:        [facility] Progress Report and Disability Certification  Date of Service: 4/7/2017   No Show:  No  Date / Time of Next Visit:     Claim Information   Patient Name: Rodriguez Kramer  Claim Number:     Employer:    Date of Injury: 12/3/2012     Insurer / TPA: Misc Workers Comp ID / SSN: [id number]    Occupation:  Diagnosis: Diagnoses of Altered mental status, unspecified altered mental status type and Acute left-sided low back pain with left-sided sciatica were pertinent to this visit.    Medical Information   Related to Industrial Injury?   ***   Subjective Complaints:      Objective Findings:     Pre-Existing Condition(s):     Assessment:        Status:    Permanent Disability:     Plan:      Diagnostics:      Comments:       Disability Information   Status:      From:     Through:   Restrictions are:     Physical Restrictions   Sitting:    Standing:    Stooping:    Bending:      Squatting:    Walking:    Climbing:    Pushing:      Pulling:    Other:    Reaching Above Shoulder (L):   Reaching Above Shoulder (R):       Reaching Below Shoulder (L):    Reaching Below Shoulder (R):      Not to exceed Weight Limits   Carrying(hrs):   Weight Limit(lb):   Lifting(hrs):   Weight  Limit(lb):     Comments:      Repetitive Actions   Hands: i.e. Fine Manipulations from Grasping:     Feet: i.e. Operating Foot Controls:     Driving / Operate Machinery:     Physician Name: Ricardo Shore Physician Signature:   e-Signature:  , Medical Director   Clinic Name / Location: [facility], EMERGENCY DEPT  [address] [address]  [phone]     Clinic Phone Number: Dept: [phone]   Appointment Time:  Visit Start Time:    Check-In Time:  2:07 PM Visit Discharge Time:       Original-Treating Physician or Chiropractor    Page 2-Insurer/TPA    Page 3-Employer    Page 4-Employee

## 2017-04-07 NOTE — LETTER
[facility], EMERGENCY DEPT   [address], [place] [address]  Phone: Dept: [phone] - Fax:        [facility] Progress Report and Disability Certification  Date of Service: 4/7/2017   No Show:  No  Date / Time of Next Visit:     Claim Information   Patient Name: Rodriguez Kramer  Claim Number:     Employer:    Date of Injury: 12/3/2012     Insurer / TPA: Misc Workers Comp ID / SSN: [id number]    Occupation:  Diagnosis: Diagnoses of Altered mental status, unspecified altered mental status type and Polysubstance abuse were pertinent to this visit.    Medical Information   Related to Industrial Injury?   ***   Subjective Complaints:      Objective Findings:     Pre-Existing Condition(s):     Assessment:        Status:    Permanent Disability:     Plan:      Diagnostics:      Comments:       Disability Information   Status:      From:     Through:   Restrictions are:     Physical Restrictions   Sitting:    Standing:    Stooping:    Bending:      Squatting:    Walking:    Climbing:    Pushing:      Pulling:    Other:    Reaching Above Shoulder (L):   Reaching Above Shoulder (R):       Reaching Below Shoulder (L):    Reaching Below Shoulder (R):      Not to exceed Weight Limits   Carrying(hrs):   Weight Limit(lb):   Lifting(hrs):   Weight  Limit(lb):     Comments:      Repetitive Actions   Hands: i.e. Fine Manipulations from Grasping:     Feet: i.e. Operating Foot Controls:     Driving / Operate Machinery:     Physician Name: Rodriguez Okeefe Physician Signature:   e-Signature:  , Medical Director   Clinic Name / Location: [facility], EMERGENCY DEPT  [address] [place] [id number] [phone]     Clinic Phone Number: Dept: [phone]   Appointment Time:  Visit Start Time:    Check-In Time:  5:29 PM Visit Discharge Time:    Original-Treating Physician or Chiropractor    Page 2-Insurer/TPA    Page  3-Employer    Page 4-Employee

## 2017-04-07 NOTE — LETTER
[facility], EMERGENCY DEPT   [address] [address]  Phone: Dept: [phone] - Fax:        [facility] Progress Report and Disability Certification  Date of Service: 4/7/2017   No Show:  No  Date / Time of Next Visit:     Claim Information   Patient Name: Rodriguez Kramer  Claim Number:     Employer:    Date of Injury: 12/3/2012     Insurer / TPA: Misc Workers Comp ID / SSN: [id number]    Occupation:  Diagnosis: Diagnoses of Altered mental status, unspecified altered mental status type and Acute left-sided low back pain with left-sided sciatica were pertinent to this visit.    Medical Information   Related to Industrial Injury?   ***   Subjective Complaints:      Objective Findings:     Pre-Existing Condition(s):     Assessment:        Status:    Permanent Disability:     Plan:      Diagnostics:      Comments:       Disability Information   Status:      From:     Through:   Restrictions are:     Physical Restrictions   Sitting:    Standing:    Stooping:    Bending:      Squatting:    Walking:    Climbing:    Pushing:      Pulling:    Other:    Reaching Above Shoulder (L):   Reaching Above Shoulder (R):       Reaching Below Shoulder (L):    Reaching Below Shoulder (R):      Not to exceed Weight Limits   Carrying(hrs):   Weight Limit(lb):   Lifting(hrs):   Weight  Limit(lb):     Comments:      Repetitive Actions   Hands: i.e. Fine Manipulations from Grasping:     Feet: i.e. Operating Foot Controls:     Driving / Operate Machinery:     Physician Name: Ricardo Shore Physician Signature:   e-Signature:  , Medical Director   Clinic Name / Location: [facility], EMERGENCY DEPT  [address] [place] [address]  [phone]     Clinic Phone Number: Dept: [phone]   Appointment Time:  Visit Start Time:    Check-In Time:  2:07 PM Visit Discharge Time:       Original-Treating Physician or Chiropractor    Page 2-Insurer/TPA    Page 3-Employer    Page 4-Employee

## 2017-04-07 NOTE — ED NOTES
The Medication Reconciliation process has been completed by interviewing the patient    Allergies have been reviewed  Antibiotic use in 30 days - On Daptomycin at home daily -unknown home health agency - started about 3 weeks ago.     Home Pharmacy:  [facility]

## 2017-04-07 NOTE — IP AVS SNAPSHOT
Home Care Instructions                                                                                                                  Name:Rodriguez Kramer  Medical Record Number:[id number]  CSN: [id number]    YOB: 1980   Age: 36 y.o.  Sex: male  HT:1.829 m (6') WT: 89.7 kg (197 lb 12 oz)          Admit Date: 4/7/2017     Discharge Date:   Today's Date: 4/9/2017  Attending Doctor:  Mariana Carrasquillo M.D.                  Allergies:  Nkda and Tape            Discharge Instructions       Discharge Instructions    Discharged to home by car with relative. Discharged via walking, hospital escort: Yes.  Special equipment needed: Cane    Be sure to schedule a follow-up appointment with your primary care doctor or any specialists as instructed.     Discharge Plan:   Smoking Cessation Offered: Patient Refused  Influenza Vaccine Indication: Not indicated: Previously immunized this influenza season and > 8 years of age  Influenza Vaccine Given - only chart on this line when given: Influenza Vaccine Given (See MAR)    I understand that a diet low in cholesterol, fat, and sodium is recommended for good health. Unless I have been given specific instructions below for another diet, I accept this instruction as my diet prescription.   Other diet: REGULAR DIET    Special Instructions: None    · Is patient discharged on Warfarin / Coumadin?   No     · Is patient Post Blood Transfusion?  No      Depression / Suicide Risk    As you are discharged from this [facility] facility, it is important to learn how to keep safe from harming yourself.    Recognize the warning signs:  · Abrupt changes in personality, positive or negative- including increase in energy   · Giving away possessions  · Change in eating patterns- significant weight changes-  positive or negative  · Change in sleeping patterns- unable to sleep or sleeping all the time   · Unwillingness or inability to communicate  · Depression  · Unusual sadness,  discouragement and loneliness  · Talk of wanting to die  · Neglect of personal appearance   · Rebelliousness- reckless behavior  · Withdrawal from people/activities they love  · Confusion- inability to concentrate     If you or a loved one observes any of these behaviors or has concerns about self-harm, here's what you can do:  · Talk about it- your feelings and reasons for harming yourself  · Remove any means that you might use to hurt yourself (examples: pills, rope, extension cords, firearm)  · Get professional help from the community (Mental Health, Substance Abuse, psychological counseling)  · Do not be alone:Call your Safe Contact- someone whom you trust who will be there for you.  · Call your local CRISIS HOTLINE 306-0739 or 583-121-5196  · Call your local Children's Mobile Crisis Response Team [place] (628) 421-4444 or www.[employer]  · Call the toll free National Suicide Prevention Hotlines   · National Suicide Prevention Lifeline 311-656-QXAA (7787)  · National Hope Line Network 800-SUICIDE (095-4843)      Drug Abuse, Frequently Asked Questions  Drug addiction is a complex brain disease. It is characterized by compulsive, at times uncontrollable, drug craving, seeking, and use that persists even in the face of extremely negative results. Drug seeking becomes compulsive, in large part as a result of the effects of prolonged drug use on brain functioning and, thus, on behavior. For many people, drug addiction becomes chronic, with relapses possible even after long periods of being off the drug.  HOW QUICKLY CAN I BECOME ADDICTED TO A DRUG?  There is no easy answer to this. If and how quickly you might become addicted to a drug depends on many factors including the biology of your body. All drugs are potentially harmful and may have life-threatening consequences associated with their use. There are also vast differences among individuals in sensitivity to various drugs. While one person may use a  drug many times and suffer no ill effects, another person may be particularly vulnerable and overdose or developing a craving with the first use. There is no way of knowing in advance how someone may react.  HOW DO I KNOW IF SOMEONE IS ADDICTED TO DRUGS?  If a person is compulsively seeking and using a drug despite negative consequences (such as loss of job, debt, physical problems brought on by drug abuse, or family problems) then he or she is probably addicted. Those who screen for drug problems, such as physicians, have developed the CAGE questionnaire. These four simple questions can help detect substance abuse problems:  · Have you ever felt you ought to Cut down on your drinking/drug use?   · Have people ever Annoyed you by criticizing your drinking/drug use?   · Have you ever felt bad or Guilty about your drinking/drug use?   · Have you ever had a drink or taken a drug first thing in the morning to steady your nerves or get rid of a hangover (Eye-opener)?   WHAT ARE THE PHYSICAL SIGNS OF ABUSE OR ADDICTION?  The physical signs of abuse or addiction can vary depending on the person and the drug being abused. For example, someone who abuses marijuana may have a chronic cough or worsening of asthmatic conditions. THC, the chemical in marijuana responsible for producing its effects, is associated with weakening the immune system which makes the user more vulnerable to infections, such as pneumonia. Each drug has short-term and long-term physical effects. Stimulants like cocaine increase heart rate and blood pressure, whereas opioids like heroin may slow the heart rate and reduce breathing (respiration).   ARE THERE EFFECTIVE TREATMENTS FOR DRUG ADDICTION?  Drug addiction can be effectively treated with behavioral-based therapies and, for addiction to some drugs such as heroin or nicotine, medications may be used. Treatment may vary for each person depending on the type of drug(s) being used and multiple courses  "of treatment may be needed to achieve success. Research has revealed 13 basic principles that underlie effective drug addiction treatment. These are discussed in ISI's Principles of Drug Addiction Treatment: A Research-Based Guide.  WHERE CAN I FIND INFORMATION ABOUT DRUG TREATMENT PROGRAMS?  · For referrals to treatment programs, visit the Substance Abuse and Mental Health Services Administration online at http://findtreatment.[facility].gov/.   · ISI publishes an expanding series of treatment manuals, the \"clinical toolbox,\" that gives drug treatment providers research-based information for creating effective treatment programs.   WHAT IS DETOXIFICATION, OR \"DETOX\"?  Detoxification is the process of allowing the body to rid itself of a drug while managing the symptoms of withdrawal. It is often the first step in a drug treatment program and should be followed by treatment with a behavioral-based therapy and/or a medication, if available. Detox alone with no follow-up is not treatment.   WHAT IS WITHDRAWAL? HOW LONG DOES IT LAST?  Withdrawal is the variety of symptoms that occur after use of some addictive drugs is reduced or stopped. Length of withdrawal and symptoms vary with the type of drug. For example, physical symptoms of heroin withdrawal may include restlessness, muscle and bone pain, insomnia, diarrhea, vomiting, and cold flashes. These physical symptoms may last for several days, but the general depression, or dysphoria (opposite of euphoria), that often accompanies heroin withdrawal, may last for weeks. In many cases withdrawal can be easily treated with medications to ease the symptoms. But treating withdrawal is not the same as treating addiction.   WHAT ARE THE COSTS OF DRUG ABUSE TO SOCIETY?  Beyond the raw numbers are other costs to society:  · Spread of infectious diseases such as HIV/AIDS and hepatitis C either through sharing of drug paraphernalia or unprotected sex.   · Deaths due to overdose or " other complications from drug use.   · Effects on unborn children of pregnant drug users.   · Other effects such as crime and homelessness.   IF A PREGNANT WOMAN ABUSES DRUGS, DOES IT AFFECT THE FETUS?  · Many substances including alcohol, nicotine, and drugs of abuse can have negative effects on the developing fetus because they are transferred to the fetus across the placenta. For example, nicotine has been connected with premature birth and low birth weight, as has the use of cocaine. Scientific studies have shown that babies born to marijuana users were shorter, weighed less, and had smaller head sizes than those born to mothers who did not use the drug. Smaller babies are more likely to develop health problems.   · Whether a baby's health problems, if caused by a drug, will continue as the child grows, is not always known. Research does show that children born to mothers who used marijuana regularly during pregnancy may have trouble concentrating, when older. Our research continues to produce insights on the negative effects of drug use on the fetus.   Document Released: 12/20/2004 Document Revised: 03/11/2013 Document Reviewed: 03/19/2010  ExitCare® Patient Information ©2013 [employer].        Smoking Cessation, Tips for Success  If you are ready to quit smoking, congratulations! You have chosen to help yourself be healthier. Cigarettes bring nicotine, tar, carbon monoxide, and other irritants into your body. Your lungs, heart, and blood vessels will be able to work better without these poisons. There are many different ways to quit smoking. Nicotine gum, nicotine patches, a nicotine inhaler, or nicotine nasal spray can help with physical craving. Hypnosis, support groups, and medicines help break the habit of smoking.  WHAT THINGS CAN I DO TO MAKE QUITTING EASIER?   Here are some tips to help you quit for good:  Pick a date when you will quit smoking completely. Tell all of your friends and family about  "your plan to quit on that date.  Do not try to slowly cut down on the number of cigarettes you are smoking. Pick a quit date and quit smoking completely starting on that day.  Throw away all cigarettes.    Clean and remove all ashtrays from your home, work, and car.  On a card, write down your reasons for quitting. Carry the card with you and read it when you get the urge to smoke.  Cleanse your body of nicotine. Drink enough water and fluids to keep your urine clear or pale yellow. Do this after quitting to flush the nicotine from your body.  Learn to predict your moods. Do not let a bad situation be your excuse to have a cigarette. Some situations in your life might tempt you into wanting a cigarette.  Never have \"just one\" cigarette. It leads to wanting another and another. Remind yourself of your decision to quit.  Change habits associated with smoking. If you smoked while driving or when feeling stressed, try other activities to replace smoking. Stand up when drinking your coffee. Brush your teeth after eating. Sit in a different chair when you read the paper. Avoid alcohol while trying to quit, and try to drink fewer caffeinated beverages. Alcohol and caffeine may urge you to smoke.  Avoid foods and drinks that can trigger a desire to smoke, such as sugary or spicy foods and alcohol.  Ask people who smoke not to smoke around you.  Have something planned to do right after eating or having a cup of coffee. For example, plan to take a walk or exercise.  Try a relaxation exercise to calm you down and decrease your stress. Remember, you may be tense and nervous for the first 2 weeks after you quit, but this will pass.  Find new activities to keep your hands busy. Play with a pen, coin, or rubber band. Doodle or draw things on paper.  Brush your teeth right after eating. This will help cut down on the craving for the taste of tobacco after meals. You can also try mouthwash.    Use oral substitutes in place of " "cigarettes. Try using lemon drops, carrots, cinnamon sticks, or chewing gum. Keep them handy so they are available when you have the urge to smoke.  When you have the urge to smoke, try deep breathing.  Designate your home as a nonsmoking area.  If you are a heavy smoker, ask your health care provider about a prescription for nicotine chewing gum. It can ease your withdrawal from nicotine.  Reward yourself. Set aside the cigarette money you save and buy yourself something nice.  Look for support from others. Join a support group or smoking cessation program. Ask someone at home or at work to help you with your plan to quit smoking.  Always ask yourself, \"Do I need this cigarette or is this just a reflex?\" Tell yourself, \"Today, I choose not to smoke,\" or \"I do not want to smoke.\" You are reminding yourself of your decision to quit.  Do not replace cigarette smoking with electronic cigarettes (commonly called e-cigarettes). The safety of e-cigarettes is unknown, and some may contain harmful chemicals.  If you relapse, do not give up! Plan ahead and think about what you will do the next time you get the urge to smoke.  HOW WILL I FEEL WHEN I QUIT SMOKING?  You may have symptoms of withdrawal because your body is used to nicotine (the addictive substance in cigarettes). You may crave cigarettes, be irritable, feel very hungry, cough often, get headaches, or have difficulty concentrating. The withdrawal symptoms are only temporary. They are strongest when you first quit but will go away within 10-14 days. When withdrawal symptoms occur, stay in control. Think about your reasons for quitting. Remind yourself that these are signs that your body is healing and getting used to being without cigarettes. Remember that withdrawal symptoms are easier to treat than the major diseases that smoking can cause.   Even after the withdrawal is over, expect periodic urges to smoke. However, these cravings are generally short lived and " "will go away whether you smoke or not. Do not smoke!  WHAT RESOURCES ARE AVAILABLE TO HELP ME QUIT SMOKING?  Your health care provider can direct you to community resources or hospitals for support, which may include:  Group support.  Education.  Hypnosis.  Therapy.      Food Choices to Lower Your Triglycerides  Triglycerides are a type of fat in your blood. High levels of triglycerides can increase the risk of heart disease and stroke. If your triglyceride levels are high, the foods you eat and your eating habits are very important. Choosing the right foods can help lower your triglycerides.   WHAT GENERAL GUIDELINES DO I NEED TO FOLLOW?  · Lose weight if you are overweight.    · Limit or avoid alcohol.    · Fill one half of your plate with vegetables and green salads.    · Limit fruit to two servings a day. Choose fruit instead of juice.    · Make one fourth of your plate whole grains. Look for the word \"whole\" as the first word in the ingredient list.  · Fill one fourth of your plate with lean protein foods.  · Enjoy fatty fish (such as salmon, mackerel, sardines, and tuna) three times a week.    · Choose healthy fats.    · Limit foods high in starch and sugar.  · Eat more home-cooked food and less restaurant, buffet, and fast food.  · Limit fried foods.  · Cook foods using methods other than frying.  · Limit saturated fats.  · Check ingredient lists to avoid foods with partially hydrogenated oils (trans fats) in them.  WHAT FOODS CAN I EAT?   Grains  Whole grains, such as whole wheat or whole grain breads, crackers, cereals, and pasta. Unsweetened oatmeal, bulgur, barley, quinoa, or brown rice. Corn or whole wheat flour tortillas.   Vegetables  Fresh or frozen vegetables (raw, steamed, roasted, or grilled). Green salads.  Fruits  All fresh, canned (in natural juice), or frozen fruits.  Meat and Other Protein Products  Ground beef (85% or leaner), grass-fed beef, or beef trimmed of fat. Skinless chicken or " turkey. Ground chicken or turkey. Pork trimmed of fat. All fish and seafood. Eggs. Dried beans, peas, or lentils. Unsalted nuts or seeds. Unsalted canned or dry beans.  Dairy  Low-fat dairy products, such as skim or 1% milk, 2% or reduced-fat cheeses, low-fat ricotta or cottage cheese, or plain low-fat yogurt.  Fats and Oils  Tub margarines without trans fats. Light or reduced-fat mayonnaise and salad dressings. Avocado. Safflower, olive, or canola oils. Natural peanut or almond butter.  The items listed above may not be a complete list of recommended foods or beverages. Contact your dietitian for more options.  WHAT FOODS ARE NOT RECOMMENDED?   Grains  White bread. White pasta. White rice. Cornbread. Bagels, pastries, and croissants. Crackers that contain trans fat.  Vegetables  White potatoes. Corn. Creamed or fried vegetables. Vegetables in a cheese sauce.  Fruits  Dried fruits. Canned fruit in light or heavy syrup. Fruit juice.  Meat and Other Protein Products  Fatty cuts of meat. Ribs, chicken wings, young, sausage, bologna, salami, chitterlings, fatback, hot dogs, bratwurst, and packaged luncheon meats.  Dairy  Whole or 2% milk, cream, half-and-half, and cream cheese. Whole-fat or sweetened yogurt. Full-fat cheeses. Nondairy creamers and whipped toppings. Processed cheese, cheese spreads, or cheese curds.  Sweets and Desserts  Corn syrup, sugars, honey, and molasses. Candy. Jam and jelly. Syrup. Sweetened cereals. Cookies, pies, cakes, donuts, muffins, and ice cream.  Fats and Oils  Butter, stick margarine, lard, shortening, ghee, or young fat. Coconut, palm kernel, or palm oils.  Beverages  Alcohol. Sweetened drinks (such as sodas, lemonade, and fruit drinks or punches).  The items listed above may not be a complete list of foods and beverages to avoid. Contact your dietitian for more information.     This information is not intended to replace advice given to you by your health care provider. Make sure you  discuss any questions you have with your health care provider.     Document Released: 10/05/2005 Document Revised: 01/08/2016 Document Reviewed: 10/22/2014  [employer] Interactive Patient Education ©2016 [employer]      Your appointments     Apr 10, 2017 10:45 AM   Follow Up Visit with Moriah Fernández M.D.   [facility] ([address] [place] [address])    [address], [facility] [address]  [place] [id number] [phone]           You will be receiving a confirmation call a few days before your appointment from our automated call confirmation system.              Follow-up Information     1. Follow up with TERRIE Bush. Schedule an appointment as soon as possible for a visit in 2 weeks.    Specialty:  Family Medicine    Contact information    [address]   [address]  [place] [address]  [phone]           Discharge Medication Instructions:    Below are the medications your physician expects you to take upon discharge:    Review all your home medications and newly ordered medications with your doctor and/or pharmacist. Follow medication instructions as directed by your doctor and/or pharmacist.    Please keep your medication list with you and share with your physician.               Medication List      START taking these medications        Instructions    lorazepam 1 MG Tabs   Commonly known as:  ATIVAN    Take 1 Tab by mouth every four hours as needed for Anxiety.   Dose:  1 mg       nicotine 14 MG/24HR Pt24   Last time this was given:  14 mg on 4/8/2017 10:52 PM   Commonly known as:  NICODERM    Apply 1 Patch to skin as directed every 24 hours.   Dose:  1 Patch         CHANGE how you take these medications        Instructions    gabapentin 300 MG Caps   What changed:  how much to take   Last time this was given:  300 mg on 4/9/2017  8:40 AM   Commonly known as:  NEURONTIN    Take 2 Caps by mouth 3 times a day as needed.   Dose:  600 mg         CONTINUE taking these medications        Instructions    albuterol 108 (90  BASE) MCG/ACT Aers inhalation aerosol    Inhale 2 Puffs by mouth every 6 hours as needed for Shortness of Breath.   Dose:  2 Puff       omeprazole 20 MG delayed-release capsule   Commonly known as:  PRILOSEC    Take 20 mg by mouth every day. Indications: Gastroesophageal Reflux Disease   Dose:  20 mg       oxycodone-acetaminophen 5-325 MG Tabs   Commonly known as:  PERCOCET    Take 1-2 Tabs by mouth every four hours as needed.   Dose:  1-2 Tab         STOP taking these medications     diazepam 5 MG Tabs   Commonly known as:  VALIUM       NS SOLN 50 mL with daptomycin 500 MG SOLR 540 mg               Instructions           Diet / Nutrition:    Follow any diet instructions given to you by your doctor or the dietician, including how much salt (sodium) you are allowed each day.    If you are overweight, talk to your doctor about a weight reduction plan.    Activity:    Remain physically active following your doctor's instructions about exercise and activity.    Rest often.     Any time you become even a little tired or short of breath, SIT DOWN and rest.    Worsening Symptoms:    Report any of the following signs and symptoms to the doctor's office immediately:    *Pain of jaw, arm, or neck  *Chest pain not relieved by medication                               *Dizziness or loss of consciousness  *Difficulty breathing even when at rest   *More tired than usual                                       *Bleeding drainage or swelling of surgical site  *Swelling of feet, ankles, legs or stomach                 *Fever (>100ºF)  *Pink or blood tinged sputum  *Weight gain (3lbs/day or 5lbs /week)           *Shock from internal defibrillator (if applicable)  *Palpitations or irregular heartbeats                *Cool and/or numb extremities    Stroke Awareness    Common Risk Factors for Stroke include:    Age  Atrial Fibrillation  Carotid Artery Stenosis  Diabetes Mellitus  Excessive alcohol consumption  High blood  pressure  Overweight   Physical inactivity  Smoking    Warning signs and symptoms of a stroke include:    *Sudden numbness or weakness of the face, arm or leg (especially on one side of the body).  *Sudden confusion, trouble speaking or understanding.  *Sudden trouble seeing in one or both eyes.  *Sudden trouble walking, dizziness, loss of balance or coordination.Sudden severe headache with no known cause.    It is very important to get treatment quickly when a stroke occurs. If you experience any of the above warning signs, call 911 immediately.                   Disclaimer         Quit Smoking / Tobacco Use:    I understand the use of any tobacco products increases my chance of suffering from future heart disease or stroke and could cause other illnesses which may shorten my life. Quitting the use of tobacco products is the single most important thing I can do to improve my health. For further information on smoking / tobacco cessation call a Toll Free Quit Line at 1-911.682.5924 (*[employer]) or 1-757.216.3598 (American Lung Association) or you can access the web based program at www.lungCortona3D.org.    [place] Tobacco Users Help Line:  (936) 966-9532       Toll Free: 1-445.427.8196  Quit Tobacco Program [facility] (845)772-2512    Crisis Hotline:    [place] Crisis Hotline:  4-138-BEUTYOI or 1-214.823.6649    [place] Crisis Hotline:    1-740.799.1504 or 054-065-7673    Discharge Survey:   Thank you for choosing [facility]. We hope we did everything we could to make your hospital stay a pleasant one. You may be receiving a phone survey and we would appreciate your time and participation in answering the questions. Your input is very valuable to us in our efforts to improve our service to our patients and their families.        My signature on this form indicates that:    1. I have reviewed and understand the above information.  2. My questions regarding this information have  been answered to my satisfaction.  3. I have formulated a plan with my discharge nurse to obtain my prescribed medications for home.                  Disclaimer         __________________________________                     __________       ________                       Patient Signature                                                 Date                    Time

## 2017-04-07 NOTE — LETTER
[facility], EMERGENCY DEPT   [address] [place] [address]  Phone: Dept: [phone] - Fax:        [facility] Progress Report and Disability Certification  Date of Service: 4/7/2017   No Show:  No  Date / Time of Next Visit:     Claim Information   Patient Name: Rodriguez Kramer  Claim Number:     Employer:    Date of Injury: 12/3/2012     Insurer / TPA: Misc Workers Comp ID / SSN: [id number]    Occupation:  Diagnosis: Diagnoses of Altered mental status, unspecified altered mental status type and Acute left-sided low back pain with left-sided sciatica were pertinent to this visit.    Medical Information   Related to Industrial Injury?   ***   Subjective Complaints:      Objective Findings:     Pre-Existing Condition(s):     Assessment:        Status:    Permanent Disability:     Plan:      Diagnostics:      Comments:       Disability Information   Status:      From:     Through:   Restrictions are:     Physical Restrictions   Sitting:    Standing:    Stooping:    Bending:      Squatting:    Walking:    Climbing:    Pushing:      Pulling:    Other:    Reaching Above Shoulder (L):   Reaching Above Shoulder (R):       Reaching Below Shoulder (L):    Reaching Below Shoulder (R):      Not to exceed Weight Limits   Carrying(hrs):   Weight Limit(lb):   Lifting(hrs):   Weight  Limit(lb):     Comments:      Repetitive Actions   Hands: i.e. Fine Manipulations from Grasping:     Feet: i.e. Operating Foot Controls:     Driving / Operate Machinery:     Physician Name: Ricardo Shore Physician Signature:   e-Signature:  , Medical Director   Clinic Name / Location: [facility], EMERGENCY DEPT  [address] [place] [address]  [phone]     Clinic Phone Number: Dept: [phone]   Appointment Time:  Visit Start Time:    Check-In Time:  2:07 PM Visit Discharge Time:       Original-Treating Physician or Chiropractor    Page 2-Insurer/TPA    Page 3-Employer    Page 4-Employee

## 2017-04-07 NOTE — LETTER
[facility], EMERGENCY DEPT   [address], [place] [address]  Phone: Dept: [phone] - Fax:        [facility] Progress Report and Disability Certification  Date of Service: 4/7/2017   No Show:  No  Date / Time of Next Visit:     Claim Information   Patient Name: Rodriguez Kramer  Claim Number:     Employer:    Date of Injury: 12/3/2012     Insurer / TPA: Misc Workers Comp ID / SSN: [id number]    Occupation:  Diagnosis: There were no encounter diagnoses.    Medical Information   Related to Industrial Injury?   ***   Subjective Complaints:      Objective Findings:     Pre-Existing Condition(s):     Assessment:        Status:    Permanent Disability:     Plan:      Diagnostics:      Comments:       Disability Information   Status:      From:     Through:   Restrictions are:     Physical Restrictions   Sitting:    Standing:    Stooping:    Bending:      Squatting:    Walking:    Climbing:    Pushing:      Pulling:    Other:    Reaching Above Shoulder (L):   Reaching Above Shoulder (R):       Reaching Below Shoulder (L):    Reaching Below Shoulder (R):      Not to exceed Weight Limits   Carrying(hrs):   Weight Limit(lb):   Lifting(hrs):   Weight  Limit(lb):     Comments:      Repetitive Actions   Hands: i.e. Fine Manipulations from Grasping:     Feet: i.e. Operating Foot Controls:     Driving / Operate Machinery:     Physician Name: Rodriguez Okeefe Physician Signature:   e-Signature:  , Medical Director   Clinic Name / Location: [facility], EMERGENCY DEPT  [address] [place] [id number] [phone]     Clinic Phone Number: Dept: [phone]   Appointment Time:  Visit Start Time:    Check-In Time:  5:29 PM Visit Discharge Time:    Original-Treating Physician or Chiropractor    Page 2-Insurer/TPA    Page 3-Employer    Page 4-Employee

## 2017-04-07 NOTE — LETTER
[facility], EMERGENCY DEPT   [address], [place] [address]  Phone: Dept: [phone] - Fax:        [facility] Progress Report and Disability Certification  Date of Service: 4/7/2017   No Show:  No  Date / Time of Next Visit:     Claim Information   Patient Name: Rodriguez Kramer  Claim Number:     Employer:    Date of Injury: 12/3/2012     Insurer / TPA: Misc Workers Comp ID / SSN: [id number]    Occupation:  Diagnosis: There were no encounter diagnoses.    Medical Information   Related to Industrial Injury?   ***   Subjective Complaints:      Objective Findings:     Pre-Existing Condition(s):     Assessment:        Status:    Permanent Disability:     Plan:      Diagnostics:      Comments:       Disability Information   Status:      From:     Through:   Restrictions are:     Physical Restrictions   Sitting:    Standing:    Stooping:    Bending:      Squatting:    Walking:    Climbing:    Pushing:      Pulling:    Other:    Reaching Above Shoulder (L):   Reaching Above Shoulder (R):       Reaching Below Shoulder (L):    Reaching Below Shoulder (R):      Not to exceed Weight Limits   Carrying(hrs):   Weight Limit(lb):   Lifting(hrs):   Weight  Limit(lb):     Comments:      Repetitive Actions   Hands: i.e. Fine Manipulations from Grasping:     Feet: i.e. Operating Foot Controls:     Driving / Operate Machinery:     Physician Name: Rodriguez Okeefe Physician Signature:   e-Signature:  , Medical Director   Clinic Name / Location: [facility], EMERGENCY DEPT  [address] [place] [id number] [phone]     Clinic Phone Number: Dept: [phone]   Appointment Time:  Visit Start Time:    Check-In Time:  5:29 PM Visit Discharge Time:    Original-Treating Physician or Chiropractor    Page 2-Insurer/TPA    Page 3-Employer    Page 4-Employee

## 2017-04-07 NOTE — LETTER
[facility], EMERGENCY DEPT   [address], [place] [address]  Phone: Dept: [phone] - Fax:        [facility] Progress Report and Disability Certification  Date of Service: 4/7/2017   No Show:  No  Date / Time of Next Visit:     Claim Information   Patient Name: Rodriguez Kramer  Claim Number:  [id number]   Employer: [employer]  Date of Injury: 12/3/2012     Insurer / TPA: [employer] ID / SSN:     Occupation:  Diagnosis: Diagnoses of Altered mental status, unspecified altered mental status type and Polysubstance abuse were pertinent to this visit.    Medical Information   Related to Industrial Injury?   ***   Subjective Complaints:  Report of ALOC and worsening left leg pain   Objective Findings: Persistent L4-5 signal intensity on MRI   Pre-Existing Condition(s):     Assessment:   Condition Same    Status: Additional Care Required  Permanent Disability:     Plan: Consultation    Diagnostics: CTMRI    Comments:       Disability Information   Status:      From:     Through:   Restrictions are:     Physical Restrictions   Sitting:    Standing:    Stooping:    Bending:      Squatting:    Walking:    Climbing:    Pushing:      Pulling:    Other:    Reaching Above Shoulder (L):   Reaching Above Shoulder (R):       Reaching Below Shoulder (L):    Reaching Below Shoulder (R):      Not to exceed Weight Limits   Carrying(hrs):   Weight Limit(lb):   Lifting(hrs):   Weight  Limit(lb):     Comments:      Repetitive Actions   Hands: i.e. Fine Manipulations from Grasping:     Feet: i.e. Operating Foot Controls:     Driving / Operate Machinery:     Physician Name: Rodriguez Okeefe Physician Signature:   e-Signature:  , Medical Director   Clinic Name / Location: [facility], EMERGENCY DEPT  [address] [facility] [id number] [phone]     Clinic Phone Number: Dept: [phone]      Appointment Time:  Visit Start Time:    Check-In Time:  5:29 PM Visit Discharge Time:    Original-Treating Physician or Chiropractor    Page 2-Insurer/TPA    Page 3-Employer    Page 4-Employee

## 2017-04-07 NOTE — LETTER
[facility], EMERGENCY DEPT   [address] [facility] [place] [address]  Phone: Dept: [phone] - Fax:        [facility] Progress Report and Disability Certification  Date of Service: 4/7/2017   No Show:  No  Date / Time of Next Visit:     Claim Information   Patient Name: Rodriguez Kramer  Claim Number:     Employer: [employer] Date of Injury: 12/3/2012     Insurer / TPA: Misc Workers Comp ID / SSN:    Occupation:  Diagnosis: Diagnoses of Altered mental status, unspecified altered mental status type and Acute left-sided low back pain with left-sided sciatica were pertinent to this visit.    Medical Information   Related to Industrial Injury? Yes ***   Subjective Complaints:  Altered mental state increasing pain in his back   Objective Findings: Patient is slightly altered at this time, concern for encephalopathy versus epidural abscess versus infection versus other causes   Pre-Existing Condition(s):     Assessment:   Initial Visit    Status: Additional Care Required  Permanent Disability:  Comments:unable to determine    Plan: Transfer Care    Diagnostics: X-rayLaboratory    Comments:  Patient will require further evaluation. The patient's transfer from this ER to the [facility] ER on no street.    Disability Information   Status:   Comments:unable to determine    From:     Through:   Restrictions are:     Physical Restrictions   Sitting:    Standing:    Stooping:    Bending:      Squatting:    Walking:    Climbing:    Pushing:      Pulling:    Other:    Reaching Above Shoulder (L):   Reaching Above Shoulder (R):       Reaching Below Shoulder (L):    Reaching Below Shoulder (R):      Not to exceed Weight Limits   Carrying(hrs):   Weight Limit(lb):   Lifting(hrs):   Weight  Limit(lb):     Comments:      Repetitive Actions   Hands: i.e. Fine Manipulations from Grasping:     Feet: i.e. Operating Foot Controls:     Driving / Operate  Machinery:     Physician Name: Ricardo Shore Physician Signature: [email or web address]-RICARDO Barragan M.D. e-Signature:  , Medical Director   Clinic Name / Location: [facility], EMERGENCY DEPT  [address]  [place] [address]  [phone]     Clinic Phone Number: Dept: [phone]   Appointment Time:  Visit Start Time:    Check-In Time:  2:07 PM Visit Discharge Time: 5:11 PM   Original-Treating Physician or Chiropractor    Page 2-Insurer/TPA    Page 3-Employer    Page 4-Employee

## 2017-04-08 ENCOUNTER — RESOLUTE PROFESSIONAL BILLING HOSPITAL PROF FEE (OUTPATIENT)
Dept: HOSPITALIST | Facility: MEDICAL CENTER | Age: 37
End: 2017-04-08
Payer: MEDICAID

## 2017-04-08 PROCEDURE — 99220 PR INITIAL OBSERVATION CARE,LEVL III: CPT | Performed by: HOSPITALIST

## 2017-04-08 PROCEDURE — 700102 HCHG RX REV CODE 250 W/ 637 OVERRIDE(OP): Performed by: HOSPITALIST

## 2017-04-08 PROCEDURE — 700102 HCHG RX REV CODE 250 W/ 637 OVERRIDE(OP): Performed by: NURSE PRACTITIONER

## 2017-04-08 PROCEDURE — 700111 HCHG RX REV CODE 636 W/ 250 OVERRIDE (IP): Performed by: HOSPITALIST

## 2017-04-08 PROCEDURE — A9270 NON-COVERED ITEM OR SERVICE: HCPCS | Performed by: HOSPITALIST

## 2017-04-08 PROCEDURE — G0378 HOSPITAL OBSERVATION PER HR: HCPCS

## 2017-04-08 PROCEDURE — A9270 NON-COVERED ITEM OR SERVICE: HCPCS | Performed by: NURSE PRACTITIONER

## 2017-04-08 PROCEDURE — 96376 TX/PRO/DX INJ SAME DRUG ADON: CPT

## 2017-04-08 RX ORDER — OXYCODONE HYDROCHLORIDE 5 MG/1
5 TABLET ORAL EVERY 4 HOURS PRN
Status: DISCONTINUED | OUTPATIENT
Start: 2017-04-08 | End: 2017-04-09 | Stop reason: HOSPADM

## 2017-04-08 RX ORDER — ONDANSETRON 2 MG/ML
4 INJECTION INTRAMUSCULAR; INTRAVENOUS EVERY 4 HOURS PRN
Status: DISCONTINUED | OUTPATIENT
Start: 2017-04-08 | End: 2017-04-09 | Stop reason: HOSPADM

## 2017-04-08 RX ORDER — OXYCODONE HYDROCHLORIDE 5 MG/1
5 TABLET ORAL EVERY 4 HOURS PRN
Status: DISCONTINUED | OUTPATIENT
Start: 2017-04-08 | End: 2017-04-08

## 2017-04-08 RX ORDER — PROMETHAZINE HYDROCHLORIDE 25 MG/1
12.5-25 TABLET ORAL EVERY 4 HOURS PRN
Status: DISCONTINUED | OUTPATIENT
Start: 2017-04-08 | End: 2017-04-09 | Stop reason: HOSPADM

## 2017-04-08 RX ORDER — PROMETHAZINE HYDROCHLORIDE 25 MG/1
12.5-25 SUPPOSITORY RECTAL EVERY 4 HOURS PRN
Status: DISCONTINUED | OUTPATIENT
Start: 2017-04-08 | End: 2017-04-09 | Stop reason: HOSPADM

## 2017-04-08 RX ORDER — DIAZEPAM 5 MG/1
5 TABLET ORAL EVERY 6 HOURS PRN
Status: DISCONTINUED | OUTPATIENT
Start: 2017-04-08 | End: 2017-04-09 | Stop reason: HOSPADM

## 2017-04-08 RX ORDER — HALOPERIDOL 5 MG/ML
10 INJECTION INTRAMUSCULAR ONCE
Status: DISCONTINUED | OUTPATIENT
Start: 2017-04-08 | End: 2017-04-08

## 2017-04-08 RX ORDER — LORAZEPAM 2 MG/ML
0.5 INJECTION INTRAMUSCULAR EVERY 4 HOURS PRN
Status: DISCONTINUED | OUTPATIENT
Start: 2017-04-08 | End: 2017-04-09 | Stop reason: HOSPADM

## 2017-04-08 RX ORDER — BISACODYL 10 MG
10 SUPPOSITORY, RECTAL RECTAL
Status: DISCONTINUED | OUTPATIENT
Start: 2017-04-08 | End: 2017-04-09 | Stop reason: HOSPADM

## 2017-04-08 RX ORDER — OXYCODONE HYDROCHLORIDE 10 MG/1
10 TABLET ORAL EVERY 4 HOURS PRN
Status: DISCONTINUED | OUTPATIENT
Start: 2017-04-08 | End: 2017-04-09 | Stop reason: HOSPADM

## 2017-04-08 RX ORDER — OXYCODONE HYDROCHLORIDE 5 MG/1
5-10 TABLET ORAL EVERY 4 HOURS PRN
Status: DISCONTINUED | OUTPATIENT
Start: 2017-04-08 | End: 2017-04-08

## 2017-04-08 RX ORDER — ONDANSETRON 4 MG/1
4 TABLET, ORALLY DISINTEGRATING ORAL EVERY 4 HOURS PRN
Status: DISCONTINUED | OUTPATIENT
Start: 2017-04-08 | End: 2017-04-09 | Stop reason: HOSPADM

## 2017-04-08 RX ORDER — GABAPENTIN 300 MG/1
300 CAPSULE ORAL 3 TIMES DAILY
Status: DISCONTINUED | OUTPATIENT
Start: 2017-04-08 | End: 2017-04-09 | Stop reason: HOSPADM

## 2017-04-08 RX ORDER — AMOXICILLIN 250 MG
2 CAPSULE ORAL 2 TIMES DAILY
Status: DISCONTINUED | OUTPATIENT
Start: 2017-04-08 | End: 2017-04-09 | Stop reason: HOSPADM

## 2017-04-08 RX ORDER — LORAZEPAM 2 MG/ML
1-2 INJECTION INTRAMUSCULAR EVERY 4 HOURS PRN
Status: DISCONTINUED | OUTPATIENT
Start: 2017-04-08 | End: 2017-04-08

## 2017-04-08 RX ORDER — HALOPERIDOL 5 MG/ML
10 INJECTION INTRAMUSCULAR EVERY 4 HOURS PRN
Status: DISCONTINUED | OUTPATIENT
Start: 2017-04-08 | End: 2017-04-08

## 2017-04-08 RX ORDER — POLYETHYLENE GLYCOL 3350 17 G/17G
1 POWDER, FOR SOLUTION ORAL
Status: DISCONTINUED | OUTPATIENT
Start: 2017-04-08 | End: 2017-04-09 | Stop reason: HOSPADM

## 2017-04-08 RX ORDER — NICOTINE 21 MG/24HR
14 PATCH, TRANSDERMAL 24 HOURS TRANSDERMAL
Status: DISCONTINUED | OUTPATIENT
Start: 2017-04-08 | End: 2017-04-09 | Stop reason: HOSPADM

## 2017-04-08 RX ADMIN — GABAPENTIN 300 MG: 300 CAPSULE ORAL at 08:52

## 2017-04-08 RX ADMIN — OXYCODONE HYDROCHLORIDE 5 MG: 5 TABLET ORAL at 08:52

## 2017-04-08 RX ADMIN — DIAZEPAM 5 MG: 5 TABLET ORAL at 21:05

## 2017-04-08 RX ADMIN — LORAZEPAM 2 MG: 2 INJECTION INTRAMUSCULAR; INTRAVENOUS at 11:25

## 2017-04-08 RX ADMIN — GABAPENTIN 300 MG: 300 CAPSULE ORAL at 21:05

## 2017-04-08 RX ADMIN — GABAPENTIN 300 MG: 300 CAPSULE ORAL at 16:04

## 2017-04-08 RX ADMIN — NICOTINE 14 MG: 14 PATCH, EXTENDED RELEASE TRANSDERMAL at 22:52

## 2017-04-08 RX ADMIN — LORAZEPAM 0.5 MG: 2 INJECTION INTRAMUSCULAR; INTRAVENOUS at 18:51

## 2017-04-08 RX ADMIN — STANDARDIZED SENNA CONCENTRATE AND DOCUSATE SODIUM 2 TABLET: 8.6; 5 TABLET, FILM COATED ORAL at 08:52

## 2017-04-08 RX ADMIN — OXYCODONE HYDROCHLORIDE 10 MG: 10 TABLET ORAL at 21:05

## 2017-04-08 ASSESSMENT — ENCOUNTER SYMPTOMS
BRUISES/BLEEDS EASILY: 0
LOSS OF CONSCIOUSNESS: 0
EYE DISCHARGE: 0
DIAPHORESIS: 0
SENSORY CHANGE: 0
FEVER: 0
CONSTIPATION: 0
NAUSEA: 0
MYALGIAS: 0
HEADACHES: 0
ABDOMINAL PAIN: 0
SPEECH CHANGE: 0
COUGH: 0
SORE THROAT: 0
CLAUDICATION: 0
FOCAL WEAKNESS: 0
WHEEZING: 0
NECK PAIN: 0
HEMOPTYSIS: 0
DEPRESSION: 0
PALPITATIONS: 0
EYE PAIN: 0
DIZZINESS: 0
SHORTNESS OF BREATH: 0
SPUTUM PRODUCTION: 0
DIARRHEA: 0
VOMITING: 0
BACK PAIN: 0
WEAKNESS: 0
CHILLS: 0

## 2017-04-08 ASSESSMENT — PATIENT HEALTH QUESTIONNAIRE - PHQ9
1. LITTLE INTEREST OR PLEASURE IN DOING THINGS: NOT AT ALL
SUM OF ALL RESPONSES TO PHQ QUESTIONS 1-9: 1
2. FEELING DOWN, DEPRESSED, IRRITABLE, OR HOPELESS: SEVERAL DAYS
SUM OF ALL RESPONSES TO PHQ9 QUESTIONS 1 AND 2: 1

## 2017-04-08 ASSESSMENT — PAIN SCALES - GENERAL
PAINLEVEL_OUTOF10: 5
PAINLEVEL_OUTOF10: 7
PAINLEVEL_OUTOF10: 4
PAINLEVEL_OUTOF10: 0
PAINLEVEL_OUTOF10: 6

## 2017-04-08 ASSESSMENT — LIFESTYLE VARIABLES
SUBSTANCE_ABUSE: 1
EVER_SMOKED: YES
DO YOU DRINK ALCOHOL: NO

## 2017-04-08 NOTE — CONSULTS
DATE OF SERVICE:  04/07/2017    REFERRING PHYSICIAN:  Ricardo Shore MD    REASON FOR CONSULTATION:  Altered mental status.    HISTORY OF PRESENT ILLNESS:  The patient is a 36-year-old white male who   underwent 4 surgeries in his back, the last surgery was February 24th that   when he underwent left lateral for L5 lumbar microdiskectomy, he developed a   CSF leak after that, hence he came back into the hospital on 03/02/2017.  He   was seen by us at that time for possibility of meningitis.  He was taken to   the OR on 03/03/2017 by Dr. Calderon who did I and D of the lumbar wound with   identification of small CSF leak and microscope with microdissection   technique.  His cultures grew coagulase negative staph and Strep viridans.  He   was discharged on daptomycin through 04/14/2017.  Patient has again come into   the emergency room because apparently he has been having altered mental   status.  He has been forgetting things.  He put pizza box in the bathroom,   hence he was brought into the ER.    REVIEW OF SYSTEMS:  He is also complaining of some back pain, some weakness of   the right leg.  No rash.  No fevers, no chills.  No headache.  Other review   of systems is negative per AMA and CMS criteria.    ALLERGIES:  No known drug allergies.    PAST MEDICAL HISTORY:  History of IV drug use.    PAST SURGICAL HISTORY:  As above.    SOCIAL HISTORY:  Occasional alcohol, smokes marijuana, but was using IV drugs   up until August of last year.    FAMILY HISTORY:  Reviewed and noncontributory to present condition.    MEDICATIONS:  He was on daptomycin at home.    LABORATORY DATA:  His WBC count is 8.6, platelets of 295.  Sed rate is 0.    Creatinine is 0.97.    PHYSICAL EXAMINATION:  GENERAL:  Patient is crying.  VITAL SIGNS:  T-max is 97.9, blood pressure is 131/76, pulse is 95.  HEAD AND ENT:  Mucosa is moist.  No oral thrush.  NECK:  Supple.  No lymphadenopathy.  CHEST:  Bilateral air entry, clear to  auscultation.  CARDIOVASCULAR:  Regular.  No murmurs, no gallops heard.  ABDOMEN:  Soft, nontender.  EXTREMITIES:  No edema.  BACK:  Incision is clean.  CENTRAL NERVOUS SYSTEM:  Alert and oriented x3.  No gross focal neurological   deficit.  PSYCHIATRIC:  Very anxious.    ASSESSMENT:  1.  Encephalopathy, rule out drug use in view of his previous history.  2.  No clear signs and symptoms of meningitis.  3.  Recent CSF leak.    RECOMMENDATIONS:  At this time, I would recommend to start him on vancomycin.    He will get MRI of the brain and spine.  Neurosurgery will see him as needed.    He will be transferred to [facility].  Urine tox has   already been sent.  I would strongly recommend pulling out his PICC line   before he leaves.  This vancomycin is to finish the course that he was already   getting.  Continue with the supportive measures.  I have reviewed all the   records.  Plan was discussed with the ER physician.       ____________________________________     ENRIKE CRUZ MD JD / MASOUD    DD:  04/07/2017 16:48:28  DT:  04/07/2017 21:21:44    D#:  [id number]  Job#:  [id number]

## 2017-04-08 NOTE — ED NOTES
"Called into room by patient. Patient noted to be sitting on the edge of the gurney, agitated, dressed in street clothes with all monitoring equipment removed. Patient cursing, agitated, and tearful, stating that he wants to leave. States \"I'm so over this shit. I hate being here. I just want to be home with my family.\" Patient states that he knows that his MD wants him to stay for observation, and that he knows that he should stay, but that he's \"just so over it.\" Patient informed that he can leave AMA if he so wishes, but again the recommendation is that he stays in the hospital for observation. Hospitalist Dr. Fulton at bedside.  "

## 2017-04-08 NOTE — ED PROVIDER NOTES
"  ED Provider Note    Scribed for Rodriguez Okeefe M.D. by Elvis aSntiago. 4/7/2017, 6:08 PM.    Primary care provider: TERRIE Bush  Means of arrival: Ambulance  History obtained from: Patient  History limited by: None    CHIEF COMPLAINT  Chief Complaint   Patient presents with   • ALOC     AAOx3, disoriented to time   • Back Pain     Chronic back pain radiating down left leg w/tinglng to LLE       HPI  Rodriguez Kramer is a 36 y.o. male who was transferred from [facility] due for MRI evaluation. Patient was discharged in the beginning of March, after having lumbar laminectomy done by Dr. Calderon. He reports worsening of back pain in the last three weeks. Patient is homeless, and reports that his wife has told him he has been \"acting confused in the last three weeks. Patient tells me he has not felt disoriented mental status in the last three weeks. Patient is regular IV meth abuser, he uses the drug intravenously about 2-3 times per day. Patient last injected meth yesterday. Patient has pick line in his right bicep, and he states that he has injected meth into his right arm IV site.  Patient also reports occasional marijuana use, his last use of marijuana was last night. Patient denies any other substance abuse. Patient's reason for transfer from [facility] previously today was for MRI Brain, Spine, and evaluation by Dr. Calderon. Patient denies any weight loss, recent trauma or injury, weakness.     REVIEW OF SYSTEMS  See HPI for further details. All other systems are negative.     C.     PAST MEDICAL HISTORY   has a past medical history of ASTHMA and Bulging discs (12/2012).    SURGICAL HISTORY   has past surgical history that includes lumbar laminectomy diskectomy (11/25/08); other neurological surg; lumbar laminectomy diskectomy (8/3/2014); lumbar fusion posterior (6/15/2015); lumbar laminectomy diskectomy (Left, 2/24/2017); and incision and drainage neuro " (3/3/2017).    SOCIAL HISTORY  Social History   Substance Use Topics   • Smoking status: Current Some Day Smoker -- 0.20 packs/day     Types: Cigarettes   • Smokeless tobacco: None   • Alcohol Use: Yes      History   Drug Use   • Yes   • Special: Inhaled     Comment: Marijuana     FAMILY HISTORY  History reviewed. No pertinent family history.    CURRENT MEDICATIONS  Reviewed.  See Encounter Summary.     ALLERGIES  Allergies   Allergen Reactions   • Nkda [No Known Drug Allergy]    • Tape      Certain tape irritates his skin       PHYSICAL EXAM  VITAL SIGNS: /82 mmHg  Pulse 91  Temp(Src) 37.2 °C (98.9 °F)  Resp 16  Ht 1.829 m (6')  Wt 92.987 kg (205 lb)  BMI 27.80 kg/m2  SpO2 100%  Constitutional: Alert in no apparent distress.  HENT: No signs of trauma, Bilateral external ears normal, Nose normal.   Eyes: Pupils are equal and reactive, Conjunctiva normal, Non-icteric.   Neck: Normal range of motion, No tenderness, Supple, No stridor.   Lymphatic: No lymphadenopathy noted.   Cardiovascular: tachycardic rate and rhythm, no murmurs.   Thorax & Lungs: Normal breath sounds, No respiratory distress, No wheezing, No chest tenderness.   Abdomen: Bowel sounds normal, Soft, No tenderness, No masses, No pulsatile masses. No peritoneal signs.  Skin: Warm, Dry, No erythema, No rash.   Back: No bony tenderness, No CVA tenderness.   Extremities: Pick line present in right upper extremity, Intact distal pulses, No edema, No tenderness, No cyanosis  Musculoskeletal: Good range of motion in all major joints. No tenderness to palpation or major deformities noted.   Neurologic: Alert, Chronic left foot drop, Normal finger to nose/heel to shin, cranial nerves II-XII intact, No pronator drift. Equal strength in upper extremities bilaterally. 5/5  Flexion and extension at bilateral knees, no saddle anesthesia   Psychiatric: Affect normal, Judgment normal, Mood normal.      DIAGNOSTIC STUDIES / PROCEDURES      RADIOLOGY  MR-BRAIN-WITH & W/O    (Results Pending)   MR-CERVICAL SPINE-WITH & W/O    (Results Pending)   MR-LUMBAR SPINE-WITH & W/O    (Results Pending)   MR-THORACIC SPINE-WITH & W/O    (Results Pending)     The radiologist's interpretation of all radiological studies and images have been reviewed by me.    COURSE & MEDICAL DECISION MAKING  Pertinent Labs & Imaging studies reviewed. (See chart for details)    Differential diagnoses include but are not limited to: Meningitis vs. Spinal abscess vs. Pick line infection     6:08 PM - Patient seen and examined at bedside. Patient will be treated with Ativan 1 mg IV. Patient resuscitated with IV fluids for concern for sepsis. Ordered MR Brain, MR Cervical, MR Thoracic to evaluate his symptoms.     6:15 PM Review of previous medical records: Patient's previous lab results from [facility] were reviewed, showing patient tested positive for Benzodiazapine, amphetamine, and THC. Labs also showed slightly elevated lactic acid, Chest X Ray normal, Patient had pick line in place in right bicep.  Vancomycin and fluids were started at [facility].   Dr. Shore spoke with Dr. Castillo, who recommended Vancomycin and MRI of brain and spine, hence transfer to this facility. Will be evaluated by Dr. Calderon.     11:07 PM Recheck: Patient re-evaluated at [facility]. Patient reports he has had minimal improvement of pain after treatment. He reports he is hungry. Patient updated that his MRI results still in progress. He was informed he would be admitted to the hospital.     12:33 AM Spoke with Radiology, about the MRI results.     12:33 AM Recheck: Patient re-evaluated at [facility]. Patient updated that I spoke with the radiologist regarding his MRI results, which did not show changes. He was informed of his admission to the hospital for evaluation by neurology.     12:36 AM Spoke with Dr. Fulton, Hospitalist, about the patient's condition. Dr. Fulton  was updated of patient's condition and is agreeable for admission of patient.      CRITICAL CARE  The very real possibilty of a deterioration of this patient's condition required the highest level of my preparedness for sudden, emergent intervention.  I provided critical care services, which included medication orders, frequent reevaluations of the patient's condition and response to treatment, ordering and reviewing test results, and discussing the case with various consultants.  The critical care time associated with the care of the patient was 40 minutes. Review chart for interventions. This time is exclusive of any other billable procedures.       Decision Making:  This is a 36 y.o. year old male who presents with reports of altered mental status and worsening left leg pain as per wife. Of note the patient is under care of neurosurgery after microdiscectomy at L4-L5. This was completed back in February. There was postoperative complication throughout positive cultures. The patient therefore has had right upper extremity PICC in place for ongoing outpatient antibiotics. However the patient does admit to polysubstance abuse including using his PICC line for methamphetamine injection. Patient was initially evaluated at [facility] and transferred to this facility for MRI imaging and consultation with neurosurgery. I have discussed the case with the radiologist on call which does state that there is a signal intensity at the L4-L5 level. This is questionable retained infectious etiology versus chronic seroma. I discussed the case with Dr. Murdock partner on call this evening which is Dr. Murdock will see the patient in the morning the patient should be pending the hospital overnight for ongoing observation and care. The patient did have his PICC line. This point the Hospital services along with infragenicular neurosurgery to see the morning. Patient did receive antibiotic spur to my initial evaluation. Patient  has inspiratory pain medication for ongoing comfort.    Disposition:  Patient will be admitted to the hospital under the care of Dr. Fulton (Hospitalist) in critical condition.     FINAL IMPRESSION  1. Altered mental status, unspecified altered mental status type    2. Polysubstance abuse          Elvis WORKMAN (Scribe), am scribing for, and in the presence of, Rodriguez Okeefe M.D..    Electronically signed by: Elvis Santiago (Scribe), 4/7/2017    Rodriguez WORKMAN M.D. personally performed the services described in this documentation, as scribed by Elvis Santiago in my presence, and it is both accurate and complete.    The note accurately reflects work and decisions made by me.  Rodriguez Okeefe  4/8/2017  1:27 AM

## 2017-04-08 NOTE — ED NOTES
IV access placed and vancomycin restarted. PICC site clean, dry, and intact - no S/S bleeding noted.

## 2017-04-08 NOTE — PROGRESS NOTES
Infectious Disease Progress Note    Author: Naina Gomez M.D. Date of service & Time created: 2017  2:35 PM    Chief Complaint:  Chief Complaint   Patient presents with   • ALOC     AAOx3, disoriented to time   • Back Pain     Chronic back pain radiating down left leg w/tinglng to LLE       Interval History:  36 year old white male with recent back surgery and csf leak admitted with aloc  - no fevers. Admits to injecting drugs in the picc  Labs Reviewed, Medications Reviewed, Radiology Reviewed and Wound Reviewed.    Review of Systems:  Review of Systems   Constitutional: Positive for malaise/fatigue. Negative for fever.   Respiratory: Negative for cough.    Cardiovascular: Negative for chest pain.   Gastrointestinal: Negative for nausea, vomiting and diarrhea.   Genitourinary: Negative for dysuria.   Musculoskeletal: Negative for myalgias.   Neurological: Negative for sensory change, speech change and headaches.       Hemodynamics:  Temp (24hrs), Av.6 °C (97.9 °F), Min:36.2 °C (97.1 °F), Max:37.2 °C (98.9 °F)  Temperature: 36.5 °C (97.7 °F)  Pulse  Av.8  Min: 57  Max: 105Heart Rate (Monitored): 90  Blood Pressure: 121/45 mmHg, NIBP: 113/64 mmHg       Physical Exam:  Physical Exam   Constitutional: He is oriented to person, place, and time. No distress.   HENT:   Mouth/Throat: No oropharyngeal exudate.   Eyes: No scleral icterus.   Neck: Neck supple.   Cardiovascular: Regular rhythm.    No murmur heard.  Pulmonary/Chest: He has no wheezes. He has no rales.   Abdominal: Soft. There is no tenderness. There is no rebound.   Musculoskeletal: He exhibits no edema.   Back incision clean   Neurological: He is alert and oriented to person, place, and time. No cranial nerve deficit.   Vitals reviewed.      Meds:    Current facility-administered medications:   •  diazepam (VALIUM) tablet 5 mg, 5 mg, Oral, Q6HRS PRN, Jonh Fulton M.D.  •  gabapentin (NEURONTIN) capsule 300 mg, 300 mg, Oral, TID, Jonh CERDA  CESAR Fulton, 300 mg at 04/08/17 0852  •  senna-docusate (PERICOLACE or SENOKOT S) 8.6-50 MG per tablet 2 Tab, 2 Tab, Oral, BID, 2 Tab at 04/08/17 0852 **AND** polyethylene glycol/lytes (MIRALAX) PACKET 1 Packet, 1 Packet, Oral, QDAY PRN **AND** magnesium hydroxide (MILK OF MAGNESIA) suspension 30 mL, 30 mL, Oral, QDAY PRN **AND** bisacodyl (DULCOLAX) suppository 10 mg, 10 mg, Rectal, QDAY PRN, Jonh Fulton M.D.  •  ondansetron (ZOFRAN) syringe/vial injection 4 mg, 4 mg, Intravenous, Q4HRS PRN, Jonh Fulton M.D.  •  ondansetron (ZOFRAN ODT) dispertab 4 mg, 4 mg, Oral, Q4HRS PRN, Jonh Fulton M.D.  •  promethazine (PHENERGAN) tablet 12.5-25 mg, 12.5-25 mg, Oral, Q4HRS PRN, John Fulton M.D.  •  promethazine (PHENERGAN) suppository 12.5-25 mg, 12.5-25 mg, Rectal, Q4HRS PRN, Jonh Fulton M.D.  •  prochlorperazine (COMPAZINE) injection 5-10 mg, 5-10 mg, Intravenous, Q4HRS PRN, Jonh Fulton M.D.  •  oxycodone immediate-release (ROXICODONE) tablet 5 mg, 5 mg, Oral, Q4HRS PRN, 5 mg at 04/08/17 0852 **OR** oxycodone immediate release (ROXICODONE) tablet 10 mg, 10 mg, Oral, Q4HRS PRN, Jonh Fulton M.D.  •  lorazepam (ATIVAN) injection 1-2 mg, 1-2 mg, Intravenous, Q4HRS PRN, Mariana Carrasquillo M.D., 2 mg at 04/08/17 1125    Labs:  Recent Labs      04/07/17   1500   WBC  8.6   RBC  5.95   HEMOGLOBIN  16.6   HEMATOCRIT  47.5   MCV  79.8*   MCH  27.9   RDW  37.3   PLATELETCT  295   MPV  9.1   NEUTSPOLYS  47.50   LYMPHOCYTES  40.10   MONOCYTES  8.80   EOSINOPHILS  2.60   BASOPHILS  0.80     Recent Labs      04/07/17   1500   SODIUM  137   POTASSIUM  4.0   CHLORIDE  106   CO2  22   GLUCOSE  86   BUN  14     Recent Labs      04/07/17   1500   ALBUMIN  4.3   TBILIRUBIN  1.0   ALKPHOSPHAT  53   TOTPROTEIN  6.8   ALTSGPT  22   ASTSGOT  21   CREATININE  0.97       Imaging:  Dx-chest-portable (1 View)    4/7/2017 4/7/2017 3:57 PM HISTORY/REASON FOR EXAM:  Sepsis. TECHNIQUE/EXAM DESCRIPTION AND NUMBER OF VIEWS: Single portable view of  the chest. COMPARISON: 1 view chest 3/7/2017 FINDINGS: Right PICC line tip projects over the proximal SVC. The lungs are clear. Cardiac silhouette: normal in size. Pleura: There are no pleural effusion or pneumothoraces. Osseous structures: No significant bony abnormality is present.     4/7/2017  1.  No acute cardiopulmonary abnormality identified. 2.  Right PICC line appears appropriately located    Mr-brain-with & W/o    4/8/2017 4/7/2017 7:47 PM HISTORY/REASON FOR EXAM:  Psychosis/Delirium. Altered level consciousness TECHNIQUE/EXAM DESCRIPTION:   MRI of the brain without and with contrast. T1 sagittal, T2 fast spin-echo axial, T1 coronal, FLAIR coronal, diffusion-weighted and apparent diffusion coefficient (ADC map) axial images were obtained of the whole brain. T1 postcontrast axial and T1 postcontrast coronal images were obtained. Additional FLAIR axial images were obtained.. The study was performed on a [employer] Signa 1.5 Tamara MRI scanner. 20 mL Omniscan gadolinium contrast was administered intravenously. COMPARISON:  There are no previous brain scans for comparison FINDINGS: The calvariae are unremarkable.  There are no extra-axial fluid collections. The ventricular system and basal cisterns are within normal limits. There are no areas of abnormal signal in the brain substance. There are no mass effects or shift of midline structures.  There are no hemorrhagic lesions.  The diffusion weighted axial images show no evidence of acute cerebral infarction. The postcontrast images show no areas of abnormal parenchymal or meningeal enhancement. The brainstem and posterior fossa structures are unremarkable. Vascular flow voids in the vertebrobasilar and carotid arteries, [place], and dural venous sinuses are intact. The paranasal sinuses in the field of view are unremarkable. The mastoids are clear.     4/8/2017  1.  MRI OF THE BRAIN WITHOUT AND WITH CONTRAST WITHIN NORMAL LIMITS. 2. The case was discussed  (preliminary call report) by Dr. FREEDMAN with BAY[facility] WONG at 12:32 AM 4/8/2017.    Mr-cervical Spine-with & W/o    4/8/2017 4/7/2017 7:47 PM HISTORY/REASON FOR EXAM:  Leg Numbness/Tingling. Altered level of consciousness. Clinical suspicion of epidural abscess. TECHNIQUE/EXAM DESCRIPTION: MRI of the cervical spine without and with contrast. The study was performed on a [employer] Signa 1.5 Tamara MRI scanner. T1 sagittal, T2 fast spin-echo sagittal, and gradient echo axial images were obtained of the cervical spine. Optional T2 fat-suppressed sagittal images may also be obtained. T1 post-contrast fat suppressed sagittal images were obtained of the cervical spine. Optional T1 post-contrast axial images may be obtained. 20 mL Omniscan gadolinium contrast was administered intravenously. COMPARISON: There are no previous cervical spine films for comparison FINDINGS: Images are degraded by patient motion artifact. Marrow signal in the vertebral bodies is normal. There is no abnormal osseous enhancement. The prevertebral and paraspinous soft tissues are unremarkable. There are no anomalies at the craniovertebral junction.  The cervical spinal cord is normal in caliber and signal throughout its course. There is no abnormal cord enhancement. There is no abnormal intradural extra medullary enhancement. At C2-3, there is no central or foraminal stenosis. At C3-4, there is no central or foraminal stenosis. At C4-5, there is a minimal disc-osteophyte complex. There is a small impression on the ventral subarachnoid space without central stenosis. The neural foramina appear intact. There is mild hypertrophic facet arthropathy. At C5-6, there is a broad-based central and asymmetrically right paramedian ventral extradural defect consistent with a disc protrusion or disc/osteophyte complex. There is mild central stenosis and mild right lateral recess stenosis. There is uncinate hypertrophy with moderate right foraminal stenosis (T2  axial image 26, series 18). The left neural foramen remains intact. At C6-C7, there is no central or foraminal stenosis. At C7-T1, there is no central or foraminal stenosis.     4/8/2017  1.  C4-5 minimal disc-osteophyte complex. No central stenosis. Mild hypertrophic facet arthropathy. 2.  C5-6 broad-based ventral extradural defect asymmetrically toward the right. Consistent with broad-based disc protrusion or disc/osteophyte complex. Mild central stenosis. Mild right lateral recess stenosis. Moderate right foraminal stenosis due to spondylotic change. 3.  No evidence of epidural abscess, discitis, or osteomyelitis. No myelopathic cord signal abnormality at any cervical level. 4.  . The case was discussed (preliminary call report) by Dr. FREEDMAN with BAY[facility] WONG at 12:32 AM 4/8/2017.    Mr-lumbar Spine-with & W/o    4/8/2017 4/7/2017 7:47 PM HISTORY/REASON FOR EXAM:  Leg Numbness/Tingling. Leg numbness and tingling. Lumbar surgery February 2017. History of postoperative abscess evacuated March 2017. TECHNIQUE/EXAM DESCRIPTION: MRI of the lumbar spine without and with contrast. The study was performed on a [employer] Signa 1.5 Tamara MRI scanner. T1 sagittal, T2 fast spin-echo sagittal, and T2 axial images were obtained of the lumbar spine. T1 post-contrast fat-suppressed sagittal images were obtained. Optional T2 fat-suppressed sagittal and T1 post-contrast axial images may be obtained. Additional STIR sagittal images were also obtained. 20 mL Omniscan gadolinium contrast was administered intravenously. COMPARISON:  MRI lumbar spine 3/2/2017 FINDINGS: Alignment in the lumbar spine shows no segmental subluxation. There is a transitional lumbosacral junction with partial sacralization of L5. Vertebral levels are assigned counting down from the cervical and thoracic levels above. The T12 vertebra is assigned as the most caudad rib-bearing bearing vertebra. Again noted is postoperative change with lumbar laminectomy at  L3-L4 and L4-L5. There is posterior fusion with transpedicular screw fixation at L3-L4. There is a small residual left-sided dorsal extradural fluid collection bordering the laminectomy interval at the L4-5 level. This measures about 22 mm x 22 mm x 10 mm (T2 axial images 11, 12, series 9 and T2 sagittal image 7, series 2). This is markedly  decreased from the collection seen on prior study from 3/2/2017. There is no dorsal epidural mass effect on the thecal sac. The surrounding posterior paraspinous muscles show diffuse heterogeneous T2 hyperintensity consistent with hemorrhage and/or edema and possibly developing fatty replacement, common expected postoperative findings. The prevertebral soft tissues including the psoas muscles are unremarkable. Marrow signal shows chronic fatty signal discogenic endplate marrow changes at L3-L4 with advanced disc space narrowing. There is minor anterior marginal spurring at this level. The conus is normal in position and signal with its tip at the L1 level. Disc height and signal is normal at T12-L1 through L2-L3. As mentioned above, there is advanced degenerative and/or postoperative disc space narrowing at L3-4. There is slight disc space narrowing and minimal loss of disc signal at L4-L5. The L5-S1 disc space is developmentally hypoplastic. At T12-L1, no abnormality. At L1-2, no abnormality. At L2-3, no abnormality. At L3-4, there is a small central and left paramedian ventral extradural defect consistent with a disc/osteophyte complex. There is slight impression on the thecal sac. The thecal sac remains generous status post decompression laminectomy. There is mild bilateral foraminal stenosis with anatomic detail is somewhat hampered by metallic artifact. At L4-5, the thecal sac is generous postlaminectomy. There is a small midline ventral extradural defect consistent with a disc protrusion. There is some lateral disc bulging. There is at least mild underlying facet  hypertrophy. There is intermediate T1 signal in the ventral epidural fat at the left and right lateral recesses with corresponding enhancement most consistent with developing granulation tissue/epidural scarring. No discrete fluid collection is evident in the ventral epidural space. There is  moderate-marked bilateral foraminal stenosis which is unchanged from the prior exam. At L5-S1, the disc space is quite hypoplastic. There is no disc bulge or protrusion or central or foraminal stenosis.     4/8/2017  1.  Transitional lumbosacral junction. Partial sacralization of L5. 2.  Postoperative lumbar laminectomy L3-4 and L4-5. Generous thecal sac at the postoperative levels with no central stenosis. 3.  Small residual left paramedian dorsal extradural fluid collection at the L4-5 level with no dorsal epidural mass effect. This is most consistent with a tiny residual seroma, although a small persisting infected collection would not be entirely excluded. No evidence of significant epidural phlegmon or epidural abscess. 4.  Degenerative changes notable for persisting moderate-marked bilateral foraminal stenosis at L4-5. 5.  Additional findings as detailed for each level above in the body of report. 6. The case was discussed (preliminary call report) by Dr. FREEDMAN with BAY[facility] WONG at 12:50 AM 4/8/2017.    Mr-thoracic Spine-with & W/o    4/8/2017 4/7/2017 7:47 PM HISTORY/REASON FOR EXAM:  Pain In Limb. Leg numbness and tingling. Lumbar surgery February 2017. History of postoperative abscess evacuated March 2017. TECHNIQUE/EXAM DESCRIPTION: MRI of the thoracic spine without and with contrast. The study was performed on a [employer] Signa 1.5 Tamara MRI scanner. T1 sagittal, T2 fast spin-echo sagittal, and T2 axial images were obtained of the thoracic spine. T1 post-contrast fat suppressed sagittal images were obtained. Optional T1 post-contrast axial images may be obtained. 20 mL Omniscan gadolinium contrast was administered  intravenously. COMPARISON:  There are no previous thoracic spine scans for comparison FINDINGS: Alignment in the thoracic spine is normal. Marrow signal in the vertebral bodies is normal. There is no abnormal osseous enhancement. The prevertebral and paraspinous soft tissues are unremarkable. The thoracic spinal cord is normal in caliber and signal throughout its course. There is no abnormal cord enhancement. There is no abnormal intradural extra medullary enhancement. There is no evidence of epidural abscess, discitis, or osteomyelitis. At T1-T2, there is a minimal right paramedian disc/osteophyte complex. Slight thinning of ventral subarachnoid space. No central stenosis. At T2-3, there is a minimal central and slightly right paramedian ventral extradural defect consistent with a disc-osteophyte complex. Minimal impression on the ventral thecal sac. Dorsal subarachnoid space remains generous. No cord compromise or central  stenosis. At T5-T6, there is a tiny left paramedian ventral extradural defect consistent with a disc/osteophyte complex. No central stenosis. At T6-7, there is a tiny right paramedian ventral extradural defect consistent with a disc/osteophyte complex. No central stenosis. There is no central or foraminal stenosis at any thoracic level.     4/8/2017  1.  Minimal disc-osteophyte changes at T1-T2, T2-T3, T5-T6, T6-T7 without cord impingement or central stenosis at any thoracic level. No significant foraminal stenosis at any thoracic level. 2.  No evidence of discitis, osteomyelitis, or epidural abscess. 3.   The case was discussed (preliminary call report) by Dr. FREEDMAN with BAYRON WONG at 12:32 AM 4/8/2017.      Micro:  BLOOD CULTURE   Date Value Ref Range Status   04/07/2017   Preliminary    No Growth    Note: Blood cultures are incubated for 5 days and  are monitored continuously.Positive blood cultures  are called to the RN and reported as soon as  they are identified.  Blood culture  testing and Gram stain, if indicated, are  performed at [facility], [address] [place] [address].  Positive blood cultures are  sent to [facility], [address] [place] [address], for organism identification and  susceptibility testing.          Assessment:  Active Hospital Problems    Diagnosis   • Back pain [M54.9]       Plan:  aloc  Due to drug use    ivdu  Using his picc  S/p removal of line    Csf leak and possible meningitis  S/p treatment  Mri ls spine shows a residual fluid collection  No further abx  Follow clinically and possible repet imaging as an outpt    Discussed with internal Medicine

## 2017-04-08 NOTE — ED NOTES
Patient medicated with 1 mg dilaudid. Report to BÁRBARA.   Telephone report to Rhonda Zelaya RN at [facility].

## 2017-04-08 NOTE — PROGRESS NOTES
Pt became very agitated when in the bathroom, was punching walls, was speaking to wife over phone, was able to get pt back into bed, medicated pt per MAR for agitation, spoke to wife Rossy Kramer over phone.  Wife wanted to have results of MRI given to her over the phone, educated wife that MD has to review results with the patient and spouse, with the permission of pt first, not within scope of practice of RN.

## 2017-04-08 NOTE — ED NOTES
"Patient noted to have removed monitoring equipment. Patient agitated talking to his wife on the telephone stating \"I don't want to fucking be here.\" Patient refused to converse appropriately with RN, becoming increasingly hostile and demanding pain medication. Patient previously admitted to injecting meth through his PICC line at home. MOnitoring equipment replaced. Discussed with ERP and Hospitalist MD.  "

## 2017-04-08 NOTE — PROGRESS NOTES
Pt is in bed, A&Ox4, has no needs at this time, refused bed alarm, bed in lowest position, call light and personal belongings within reach, pt is able to ambulate self with cane with no assistance, hourly rounding in place.

## 2017-04-08 NOTE — ED NOTES
Patient sleeping on gurney. No acute distress. No restlessness/agitation noted. Call bell within reach.

## 2017-04-08 NOTE — PROGRESS NOTES
Pt ambulated from rney to bed with stand by assistance. Alert and oriented X4. Room air. No s/s of distress at this time. Admission history completed. Refusing bed alarm and SCDs at this time. Skin assessment with 2 RNs, no breakdown noted at this time. Bed in low position. Call light placed within reach. Pt able to demonstrate appropriate use. Educated pt on need call for assistance out of bed.

## 2017-04-08 NOTE — PROGRESS NOTES
Hospital Medicine Progress Note, Adult, Complex               Author: Mariana Carrasquillo Date & Time created: 4/8/2017  8:50 AM     Interval History:  CC:  Confusion   4/7:  Admitted 36yoM with h/o multiple surgeries, last surgery on 2/24/17 with CSF leak 3/2/17 with coag neg staph on daptomycin x6 weeks via PICC line presents with confusion at home.  Admits to injecting meth through PICC line.    4/8:  MRI spine no infection per Dr. Calderon's review.  Jason consulted:  States finished Abx doses with IV vancomycin:  No further IV or po Abx needed.  BCs x 2 pending, negative thus far.  PICC line out already, tip cultured.  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  She did not realize he was abusing again and had thought he was clean since last October when he went to [facility] for rehab.  Had episode of punching in BR shortly after exam, but calmed down with RN.      Review of Systems:  Review of Systems   Constitutional: Negative for fever, chills, malaise/fatigue and diaphoresis.   HENT: Negative for congestion and sore throat.    Eyes: Negative for pain and discharge.   Respiratory: Negative for cough, hemoptysis, sputum production, shortness of breath and wheezing.    Cardiovascular: Negative for chest pain, palpitations, claudication and leg swelling.   Gastrointestinal: Negative for nausea, vomiting, abdominal pain, diarrhea, constipation and melena.   Genitourinary: Negative for dysuria, urgency and frequency.   Musculoskeletal: Negative for myalgias, back pain, joint pain and neck pain.   Skin: Negative for itching and rash.   Neurological: Negative for dizziness, sensory change, speech change, focal weakness, loss of consciousness, weakness and headaches.   Endo/Heme/Allergies: Does not bruise/bleed easily.    Psychiatric/Behavioral: Positive for substance abuse (admits to IV PICC line meth injections). Negative for depression and suicidal ideas.       Physical Exam:  Physical Exam   Constitutional: He is oriented to person, place, and time. He appears well-developed and well-nourished. No distress.   HENT:   Head: Normocephalic and atraumatic.   Mouth/Throat: Oropharynx is clear and moist. No oropharyngeal exudate.   Eyes: Conjunctivae and EOM are normal. Pupils are equal, round, and reactive to light. Right eye exhibits no discharge. Left eye exhibits no discharge. No scleral icterus.   Neck: Normal range of motion. Neck supple. No JVD present. No tracheal deviation present. No thyromegaly present.   Cardiovascular: Normal rate, regular rhythm and normal heart sounds.  Exam reveals no gallop and no friction rub.    No murmur heard.  Pulmonary/Chest: Effort normal and breath sounds normal. No respiratory distress. He has no wheezes. He has no rales. He exhibits no tenderness.   Abdominal: Soft. Bowel sounds are normal. He exhibits no distension and no mass. There is no tenderness. There is no rebound and no guarding.   Musculoskeletal: Normal range of motion. He exhibits no edema or tenderness.   Back exam, well healed lumbar midline surgical scar, no pain with palpation, no edema, no erythema   Lymphadenopathy:     He has no cervical adenopathy.   Neurological: He is alert and oriented to person, place, and time. No cranial nerve deficit. He exhibits normal muscle tone.   Skin: Skin is warm and dry. No rash noted. He is not diaphoretic. No erythema.   IV PICC line site w/o erythema or abscess.  No other areas of cellulitis seen   Psychiatric: He has a normal mood and affect. His behavior is normal. Judgment and thought content normal.   Nursing note and vitals reviewed.      Labs:  Recent Labs      04/07/17   1500  04/07/17   1649   ISTATSPEC  Venous  Venous         Recent Labs      04/07/17   1500   SODIUM  137    POTASSIUM  4.0   CHLORIDE  106   CO2  22   BUN  14   CREATININE  0.97   CALCIUM  9.0     Recent Labs      17   1500   ALTSGPT  22   ASTSGOT  21   ALKPHOSPHAT  53   TBILIRUBIN  1.0   GLUCOSE  86     Recent Labs      17   1500   RBC  5.95   HEMOGLOBIN  16.6   HEMATOCRIT  47.5   PLATELETCT  295     Recent Labs      17   1500   WBC  8.6   NEUTSPOLYS  47.50   LYMPHOCYTES  40.10   MONOCYTES  8.80   EOSINOPHILS  2.60   BASOPHILS  0.80   ASTSGOT  21   ALTSGPT  22   ALKPHOSPHAT  53   TBILIRUBIN  1.0           Hemodynamics:  Temp (24hrs), Av.6 °C (97.9 °F), Min:36.2 °C (97.1 °F), Max:37.2 °C (98.9 °F)  Temperature: 36.5 °C (97.7 °F)  Pulse  Av.8  Min: 57  Max: 105Heart Rate (Monitored): 90  Blood Pressure: 121/45 mmHg, NIBP: 113/64 mmHg     Respiratory:    Respiration: 16, Pulse Oximetry: 100 %           Fluids:  No intake or output data in the 24 hours ending 17 0850  Weight: 89.7 kg (197 lb 12 oz)  GI/Nutrition:  Orders Placed This Encounter   Procedures   • DIET ORDER     Standing Status: Standing      Number of Occurrences: 1      Standing Expiration Date:      Order Specific Question:  Diet:     Answer:  Regular [1]     Medical Decision Making, by Problem:  Active Hospital Problems    Diagnosis   • Back pain [M54.9]  Chronic back pain, no acute infection seen on exam nor per Dr. Calderon's review of MRI lumbar spine.  Oxycodone prn  neurontin tid    Meth abuse:  Had been shooting meth through PICC line   PICC line removed, cath tip cultured  BCs pending x2  UDS+meth on   valium prn  Ativan prn  Check Hepatitis and HIV.     FC, ambulatory, needs meth rehab.  SW has given list of rehab facilities.  Spoke with wife :  Explained UDS results, will try to get him past acute w/d with valium and pain medications prior to dc to home in next day or 2.    Alejandro catheter: No Alejandro      DVT Prophylaxis: Heparin    Ulcer prophylaxis: Not indicated    Assessed for rehab: Patient returned to  prior level of function, rehabilitation not indicated at this time

## 2017-04-08 NOTE — H&P
PRIMARY NEUROSURGEON:  Christopher Calderon MD    INFECTIOUS DISEASE:  Naina Gomez MD    CHIEF COMPLAINT:  Confusion.    HISTORY OF PRESENT ILLNESS:  A 36-year-old male.  He has a history of multiple   spinal surgeries, his last surgery was on 02/24/2017.  This was complicated   by a CSF leak, which was I and D'd on 03/02/2017.  Furthermore, the fluid grew   coag-negative staph.  The patient was discharged to home with a PICC line and   plans for daptomycin for 6 weeks.  Patient unfortunately has relapsed into   substance abuse.  He admits to injecting methamphetamine through the PICC   line.  He was relatively confused today doing some unusual things.  His wife   describes him putting pizza in the shower.  She encouraged him to come to the   ER.  The patient does complain of left leg pain with associated left footdrop.    This is really unchanged since the surgery.  He does wear a brace for his   footdrop.  No upper back pain.  Patient has not noted any fevers.  He has no   headache, no nausea or vomiting.  He has been in the emergency room for quite   some time to complete his MRI exams.  His mental status at this point is   appropriate.  Normally, he is no longer confused.    REVIEW OF SYSTEMS:  A comprehensive review of systems was performed.  All   pertinent positives and negatives are described in the HPI.  All other systems   reviewed and are negative.    PAST MEDICAL HISTORY:  1.  Multiple spinal surgeries.  2.  Left footdrop.  3.  Coag-negative staph infection.  4.  Hyperlipidemia.    SOCIAL HISTORY:  Patient smokes cigarettes occasionally, uses marijuana   regularly.  He admits to IV methamphetamine abuse.  He has apparently lost his   home.    FAMILY HISTORY:  Reviewed and noncontributory to this case.    ALLERGIES:  No known drug allergies.    MEDICATIONS:  Albuterol 2 puffs every 6 hours as needed for shortness of   breath, Valium 5 mg every 6 hours as needed for anxiety, gabapentin 300 mg 3   times a day  as needed, daptomycin every 24 hours, omeprazole 20 mg daily,   Percocet 5/325 mg one to two tablets every 4 hours as needed.    PHYSICAL EXAMINATION:  VITAL SIGNS:  Temperature 37.2, blood pressure 131/82; pulse 77, respirations   14, saturating 93% on room air.  GENERAL:  Well developed, well nourished, in no apparent distress.  SKIN:  Warm, dry, good turgor.  No rashes in visible areas.  HEENT:  Eyes; equal, round and reactive.  Conjunctivae are clear.  Lids are   normal.  Ear, nose, mouth and throat; lips are without lesions.  Good   dentition.  Oropharynx is clear.  NECK:  Trachea midline, no masses, no thyromegaly.  RESPIRATORY:  Unlabored respiratory effort.  LUNGS:  Clear to auscultation with no wheezing and no rhonchi.  CARDIOVASCULAR:  Normal S1, S2, no murmur, no peripheral edema.  ABDOMEN:  Soft, nontender.  No masses, no hepatosplenomegaly.  PSYCHIATRIC:  Alert and oriented x3, normal affect and mood.    ASSESSMENT AND PLAN:  A 36-year-old male with a history of CSF leak, currently   on treatment for coag-negative staph infection, presents with altered mental   status.  1.  Encephalopathy.  The patient was confused initially.  This has resolved.    I do wonder whether this may have something to do with his substance abuse.  2.  Lumbar fluid collection.  MRI of the head and spine have been performed, initial read is that   there is no significant pathology except for small fluid collection near the site of prior   surgery.  This could be a seroma or possibly an abscess.  I believe less   likely an abscess with his ESR of 0.  Normal white count and no fever.    Neurosurgery was consulted by the Emergency Room.  3.  Coag-negative staph infection.  Dr. Naina Gomez of Infectious Disease   did consult on the patient at [facility] Emergency room.  She gave the   patient a dose of vancomycin per her documentation.  This completes his   antibiotics for the previously known infection.  4.  The patient has  pulled out his PICC line.  He does not seem appropriate   for outpatient antibiotic therapy should he need further antibiotics given his   substance use.  5.  Prophylaxis, sequential compression devices.  6.  Full code.    Case was discussed with the emergency room physician, Dr. Okeefe.       ____________________________________     MD GIANNI BRISENO / MASOUD    DD:  04/08/2017 01:31:09  DT:  04/08/2017 03:09:20    D#:  [id number]  Job#:  [id number]

## 2017-04-08 NOTE — PROGRESS NOTES
NEUROSURGERY:    Incision completely healed  MRI's reviewed by Dr Calderon- no evidence of infection, no nerve compression  Nothing further to do from neurosurgical standpoint

## 2017-04-08 NOTE — ED NOTES
Received telephone call from [facility] that patient pulled out his PICC line during MRI. Dr. Okeefe notified.

## 2017-04-08 NOTE — CARE PLAN
Problem: Safety  Goal: Will remain free from injury  Outcome: PROGRESSING AS EXPECTED  Pt will remain free from falls, pt educated to use call light for assistance.     Problem: Psychosocial Needs:  Goal: Level of anxiety will decrease  Outcome: PROGRESSING SLOWER THAN EXPECTED  Pt will express needs and will be medicated per MAR for agitation

## 2017-04-08 NOTE — ED NOTES
Patient states that he will stay for admit to floor as per MD recommendation. Patient changed into hospital gown and reconnected to monitoring equipment. Box lunch given to patient - OK as per Dr. Fulton. Awaiting admit to floor.

## 2017-04-09 VITALS
RESPIRATION RATE: 16 BRPM | BODY MASS INDEX: 26.78 KG/M2 | OXYGEN SATURATION: 100 % | DIASTOLIC BLOOD PRESSURE: 66 MMHG | WEIGHT: 197.75 LBS | HEIGHT: 72 IN | SYSTOLIC BLOOD PRESSURE: 105 MMHG | HEART RATE: 86 BPM | TEMPERATURE: 97.6 F

## 2017-04-09 PROBLEM — F15.10 METHAMPHETAMINE ABUSE (HCC): Status: ACTIVE | Noted: 2017-04-09

## 2017-04-09 LAB
ALBUMIN SERPL BCP-MCNC: 4.2 G/DL (ref 3.2–4.9)
ALBUMIN/GLOB SERPL: 1.8 G/DL
ALP SERPL-CCNC: 57 U/L (ref 30–99)
ALT SERPL-CCNC: 14 U/L (ref 2–50)
ANION GAP SERPL CALC-SCNC: 10 MMOL/L (ref 0–11.9)
AST SERPL-CCNC: 13 U/L (ref 12–45)
BACTERIA UR CULT: NORMAL
BASOPHILS # BLD AUTO: 1 % (ref 0–1.8)
BASOPHILS # BLD: 0.08 K/UL (ref 0–0.12)
BILIRUB SERPL-MCNC: 0.5 MG/DL (ref 0.1–1.5)
BUN SERPL-MCNC: 12 MG/DL (ref 8–22)
CALCIUM SERPL-MCNC: 9.1 MG/DL (ref 8.5–10.5)
CHLORIDE SERPL-SCNC: 105 MMOL/L (ref 96–112)
CO2 SERPL-SCNC: 24 MMOL/L (ref 20–33)
CREAT SERPL-MCNC: 0.8 MG/DL (ref 0.5–1.4)
EOSINOPHIL # BLD AUTO: 0.36 K/UL (ref 0–0.51)
EOSINOPHIL NFR BLD: 4.4 % (ref 0–6.9)
ERYTHROCYTE [DISTWIDTH] IN BLOOD BY AUTOMATED COUNT: 38 FL (ref 35.9–50)
GFR SERPL CREATININE-BSD FRML MDRD: >60 ML/MIN/1.73 M 2
GLOBULIN SER CALC-MCNC: 2.3 G/DL (ref 1.9–3.5)
GLUCOSE SERPL-MCNC: 93 MG/DL (ref 65–99)
HAV IGM SERPL QL IA: NEGATIVE
HBV CORE IGM SER QL: NEGATIVE
HBV SURFACE AG SER QL: NEGATIVE
HCT VFR BLD AUTO: 48.6 % (ref 42–52)
HCV AB SER QL: NEGATIVE
HGB BLD-MCNC: 16.8 G/DL (ref 14–18)
IMM GRANULOCYTES # BLD AUTO: 0.03 K/UL (ref 0–0.11)
IMM GRANULOCYTES NFR BLD AUTO: 0.4 % (ref 0–0.9)
LYMPHOCYTES # BLD AUTO: 2.89 K/UL (ref 1–4.8)
LYMPHOCYTES NFR BLD: 35 % (ref 22–41)
MCH RBC QN AUTO: 28.4 PG (ref 27–33)
MCHC RBC AUTO-ENTMCNC: 34.6 G/DL (ref 33.7–35.3)
MCV RBC AUTO: 82.1 FL (ref 81.4–97.8)
MONOCYTES # BLD AUTO: 0.67 K/UL (ref 0–0.85)
MONOCYTES NFR BLD AUTO: 8.1 % (ref 0–13.4)
NEUTROPHILS # BLD AUTO: 4.22 K/UL (ref 1.82–7.42)
NEUTROPHILS NFR BLD: 51.1 % (ref 44–72)
NRBC # BLD AUTO: 0 K/UL
NRBC BLD AUTO-RTO: 0 /100 WBC
PLATELET # BLD AUTO: 278 K/UL (ref 164–446)
PMV BLD AUTO: 9.3 FL (ref 9–12.9)
POTASSIUM SERPL-SCNC: 3.9 MMOL/L (ref 3.6–5.5)
PROT SERPL-MCNC: 6.5 G/DL (ref 6–8.2)
RBC # BLD AUTO: 5.92 M/UL (ref 4.7–6.1)
SIGNIFICANT IND 70042: NORMAL
SITE SITE: NORMAL
SODIUM SERPL-SCNC: 139 MMOL/L (ref 135–145)
SOURCE SOURCE: NORMAL
WBC # BLD AUTO: 8.3 K/UL (ref 4.8–10.8)

## 2017-04-09 PROCEDURE — A9270 NON-COVERED ITEM OR SERVICE: HCPCS | Performed by: HOSPITALIST

## 2017-04-09 PROCEDURE — 700102 HCHG RX REV CODE 250 W/ 637 OVERRIDE(OP): Performed by: HOSPITALIST

## 2017-04-09 PROCEDURE — 85025 COMPLETE CBC W/AUTO DIFF WBC: CPT

## 2017-04-09 PROCEDURE — 87535 HIV-1 PROBE&REVERSE TRNSCRPJ: CPT

## 2017-04-09 PROCEDURE — 99217 PR OBSERVATION CARE DISCHARGE: CPT | Performed by: HOSPITALIST

## 2017-04-09 PROCEDURE — 80053 COMPREHEN METABOLIC PANEL: CPT

## 2017-04-09 PROCEDURE — 36415 COLL VENOUS BLD VENIPUNCTURE: CPT

## 2017-04-09 PROCEDURE — 80074 ACUTE HEPATITIS PANEL: CPT

## 2017-04-09 PROCEDURE — G0378 HOSPITAL OBSERVATION PER HR: HCPCS

## 2017-04-09 RX ORDER — LORAZEPAM 1 MG/1
1 TABLET ORAL EVERY 4 HOURS PRN
Qty: 30 TAB | Refills: 0 | Status: SHIPPED | OUTPATIENT
Start: 2017-04-09

## 2017-04-09 RX ORDER — NICOTINE 21 MG/24HR
1 PATCH, TRANSDERMAL 24 HOURS TRANSDERMAL EVERY 24 HOURS
Qty: 30 PATCH | Refills: 3 | Status: SHIPPED | OUTPATIENT
Start: 2017-04-09 | End: 2017-04-12

## 2017-04-09 RX ORDER — GABAPENTIN 300 MG/1
600 CAPSULE ORAL 3 TIMES DAILY PRN
Qty: 90 CAP | Refills: 3 | Status: SHIPPED | OUTPATIENT
Start: 2017-04-09

## 2017-04-09 RX ADMIN — GABAPENTIN 300 MG: 300 CAPSULE ORAL at 08:40

## 2017-04-09 RX ADMIN — DIAZEPAM 5 MG: 5 TABLET ORAL at 03:21

## 2017-04-09 RX ADMIN — OXYCODONE HYDROCHLORIDE 5 MG: 5 TABLET ORAL at 11:16

## 2017-04-09 ASSESSMENT — PAIN SCALES - GENERAL
PAINLEVEL_OUTOF10: 6
PAINLEVEL_OUTOF10: 7
PAINLEVEL_OUTOF10: 5

## 2017-04-09 ASSESSMENT — LIFESTYLE VARIABLES: EVER_SMOKED: YES

## 2017-04-09 NOTE — PROGRESS NOTES
"Pt was on the phone with wife and became very agitated, tearul, cursinghe was throwing items in the room and stating \"I just want to go home, I dont know what is going on\" Was able to calm down pt, pt decided to stay, educated pt on POC, pt understands, medicated per MAR  "

## 2017-04-09 NOTE — DISCHARGE INSTRUCTIONS
Discharge Instructions    Discharged to home by car with relative. Discharged via walking, hospital escort: Yes.  Special equipment needed: Cane    Be sure to schedule a follow-up appointment with your primary care doctor or any specialists as instructed.     Discharge Plan:   Smoking Cessation Offered: Patient Refused  Influenza Vaccine Indication: Not indicated: Previously immunized this influenza season and > 8 years of age  Influenza Vaccine Given - only chart on this line when given: Influenza Vaccine Given (See MAR)    I understand that a diet low in cholesterol, fat, and sodium is recommended for good health. Unless I have been given specific instructions below for another diet, I accept this instruction as my diet prescription.   Other diet: REGULAR DIET    Special Instructions: None    · Is patient discharged on Warfarin / Coumadin?   No     · Is patient Post Blood Transfusion?  No      Depression / Suicide Risk    As you are discharged from this [facility] facility, it is important to learn how to keep safe from harming yourself.    Recognize the warning signs:  · Abrupt changes in personality, positive or negative- including increase in energy   · Giving away possessions  · Change in eating patterns- significant weight changes-  positive or negative  · Change in sleeping patterns- unable to sleep or sleeping all the time   · Unwillingness or inability to communicate  · Depression  · Unusual sadness, discouragement and loneliness  · Talk of wanting to die  · Neglect of personal appearance   · Rebelliousness- reckless behavior  · Withdrawal from people/activities they love  · Confusion- inability to concentrate     If you or a loved one observes any of these behaviors or has concerns about self-harm, here's what you can do:  · Talk about it- your feelings and reasons for harming yourself  · Remove any means that you might use to hurt yourself (examples: pills, rope, extension cords, firearm)  · Get  professional help from the community (Mental Health, Substance Abuse, psychological counseling)  · Do not be alone:Call your Safe Contact- someone whom you trust who will be there for you.  · Call your local CRISIS HOTLINE 664-6595 or 718-844-9291  · Call your local Children's Mobile Crisis Response Team [place] (722) 302-8763 or www.NowledgeData  · Call the toll free National Suicide Prevention Hotlines   · National Suicide Prevention Lifeline 666-267-QSVG (6943)  · National Hope Line Network 800-SUICIDE (702-0908)      Drug Abuse, Frequently Asked Questions  Drug addiction is a complex brain disease. It is characterized by compulsive, at times uncontrollable, drug craving, seeking, and use that persists even in the face of extremely negative results. Drug seeking becomes compulsive, in large part as a result of the effects of prolonged drug use on brain functioning and, thus, on behavior. For many people, drug addiction becomes chronic, with relapses possible even after long periods of being off the drug.  HOW QUICKLY CAN I BECOME ADDICTED TO A DRUG?  There is no easy answer to this. If and how quickly you might become addicted to a drug depends on many factors including the biology of your body. All drugs are potentially harmful and may have life-threatening consequences associated with their use. There are also vast differences among individuals in sensitivity to various drugs. While one person may use a drug many times and suffer no ill effects, another person may be particularly vulnerable and overdose or developing a craving with the first use. There is no way of knowing in advance how someone may react.  HOW DO I KNOW IF SOMEONE IS ADDICTED TO DRUGS?  If a person is compulsively seeking and using a drug despite negative consequences (such as loss of job, debt, physical problems brought on by drug abuse, or family problems) then he or she is probably addicted. Those who screen for drug problems,  such as physicians, have developed the CAGE questionnaire. These four simple questions can help detect substance abuse problems:  · Have you ever felt you ought to Cut down on your drinking/drug use?   · Have people ever Annoyed you by criticizing your drinking/drug use?   · Have you ever felt bad or Guilty about your drinking/drug use?   · Have you ever had a drink or taken a drug first thing in the morning to steady your nerves or get rid of a hangover (Eye-opener)?   WHAT ARE THE PHYSICAL SIGNS OF ABUSE OR ADDICTION?  The physical signs of abuse or addiction can vary depending on the person and the drug being abused. For example, someone who abuses marijuana may have a chronic cough or worsening of asthmatic conditions. THC, the chemical in marijuana responsible for producing its effects, is associated with weakening the immune system which makes the user more vulnerable to infections, such as pneumonia. Each drug has short-term and long-term physical effects. Stimulants like cocaine increase heart rate and blood pressure, whereas opioids like heroin may slow the heart rate and reduce breathing (respiration).   ARE THERE EFFECTIVE TREATMENTS FOR DRUG ADDICTION?  Drug addiction can be effectively treated with behavioral-based therapies and, for addiction to some drugs such as heroin or nicotine, medications may be used. Treatment may vary for each person depending on the type of drug(s) being used and multiple courses of treatment may be needed to achieve success. Research has revealed 13 basic principles that underlie effective drug addiction treatment. These are discussed in [employer]I's Principles of Drug Addiction Treatment: A Research-Based Guide.  WHERE CAN I FIND INFORMATION ABOUT DRUG TREATMENT PROGRAMS?  · For referrals to treatment programs, visit the Substance Abuse and Mental Health Services Administration online at http://findtreatment.samhsa.gov/.   · ISI publishes an expanding series of treatment  "manuals, the \"clinical toolbox,\" that gives drug treatment providers research-based information for creating effective treatment programs.   WHAT IS DETOXIFICATION, OR \"DETOX\"?  Detoxification is the process of allowing the body to rid itself of a drug while managing the symptoms of withdrawal. It is often the first step in a drug treatment program and should be followed by treatment with a behavioral-based therapy and/or a medication, if available. Detox alone with no follow-up is not treatment.   WHAT IS WITHDRAWAL? HOW LONG DOES IT LAST?  Withdrawal is the variety of symptoms that occur after use of some addictive drugs is reduced or stopped. Length of withdrawal and symptoms vary with the type of drug. For example, physical symptoms of heroin withdrawal may include restlessness, muscle and bone pain, insomnia, diarrhea, vomiting, and cold flashes. These physical symptoms may last for several days, but the general depression, or dysphoria (opposite of euphoria), that often accompanies heroin withdrawal, may last for weeks. In many cases withdrawal can be easily treated with medications to ease the symptoms. But treating withdrawal is not the same as treating addiction.   WHAT ARE THE COSTS OF DRUG ABUSE TO SOCIETY?  Beyond the raw numbers are other costs to society:  · Spread of infectious diseases such as HIV/AIDS and hepatitis C either through sharing of drug paraphernalia or unprotected sex.   · Deaths due to overdose or other complications from drug use.   · Effects on unborn children of pregnant drug users.   · Other effects such as crime and homelessness.   IF A PREGNANT WOMAN ABUSES DRUGS, DOES IT AFFECT THE FETUS?  · Many substances including alcohol, nicotine, and drugs of abuse can have negative effects on the developing fetus because they are transferred to the fetus across the placenta. For example, nicotine has been connected with premature birth and low birth weight, as has the use of cocaine. " Scientific studies have shown that babies born to marijuana users were shorter, weighed less, and had smaller head sizes than those born to mothers who did not use the drug. Smaller babies are more likely to develop health problems.   · Whether a baby's health problems, if caused by a drug, will continue as the child grows, is not always known. Research does show that children born to mothers who used marijuana regularly during pregnancy may have trouble concentrating, when older. Our research continues to produce insights on the negative effects of drug use on the fetus.   Document Released: 12/20/2004 Document Revised: 03/11/2013 Document Reviewed: 03/19/2010  [employer]® Patient Information ©2013 [employer].        Smoking Cessation, Tips for Success  If you are ready to quit smoking, congratulations! You have chosen to help yourself be healthier. Cigarettes bring nicotine, tar, carbon monoxide, and other irritants into your body. Your lungs, heart, and blood vessels will be able to work better without these poisons. There are many different ways to quit smoking. Nicotine gum, nicotine patches, a nicotine inhaler, or nicotine nasal spray can help with physical craving. Hypnosis, support groups, and medicines help break the habit of smoking.  WHAT THINGS CAN I DO TO MAKE QUITTING EASIER?   Here are some tips to help you quit for good:  Pick a date when you will quit smoking completely. Tell all of your friends and family about your plan to quit on that date.  Do not try to slowly cut down on the number of cigarettes you are smoking. Pick a quit date and quit smoking completely starting on that day.  Throw away all cigarettes.    Clean and remove all ashtrays from your home, work, and car.  On a card, write down your reasons for quitting. Carry the card with you and read it when you get the urge to smoke.  Cleanse your body of nicotine. Drink enough water and fluids to keep your urine clear or pale yellow. Do this  "after quitting to flush the nicotine from your body.  Learn to predict your moods. Do not let a bad situation be your excuse to have a cigarette. Some situations in your life might tempt you into wanting a cigarette.  Never have \"just one\" cigarette. It leads to wanting another and another. Remind yourself of your decision to quit.  Change habits associated with smoking. If you smoked while driving or when feeling stressed, try other activities to replace smoking. Stand up when drinking your coffee. Brush your teeth after eating. Sit in a different chair when you read the paper. Avoid alcohol while trying to quit, and try to drink fewer caffeinated beverages. Alcohol and caffeine may urge you to smoke.  Avoid foods and drinks that can trigger a desire to smoke, such as sugary or spicy foods and alcohol.  Ask people who smoke not to smoke around you.  Have something planned to do right after eating or having a cup of coffee. For example, plan to take a walk or exercise.  Try a relaxation exercise to calm you down and decrease your stress. Remember, you may be tense and nervous for the first 2 weeks after you quit, but this will pass.  Find new activities to keep your hands busy. Play with a pen, coin, or rubber band. Doodle or draw things on paper.  Brush your teeth right after eating. This will help cut down on the craving for the taste of tobacco after meals. You can also try mouthwash.    Use oral substitutes in place of cigarettes. Try using lemon drops, carrots, cinnamon sticks, or chewing gum. Keep them handy so they are available when you have the urge to smoke.  When you have the urge to smoke, try deep breathing.  Designate your home as a nonsmoking area.  If you are a heavy smoker, ask your health care provider about a prescription for nicotine chewing gum. It can ease your withdrawal from nicotine.  Reward yourself. Set aside the cigarette money you save and buy yourself something nice.  Look for support " "from others. Join a support group or smoking cessation program. Ask someone at home or at work to help you with your plan to quit smoking.  Always ask yourself, \"Do I need this cigarette or is this just a reflex?\" Tell yourself, \"Today, I choose not to smoke,\" or \"I do not want to smoke.\" You are reminding yourself of your decision to quit.  Do not replace cigarette smoking with electronic cigarettes (commonly called e-cigarettes). The safety of e-cigarettes is unknown, and some may contain harmful chemicals.  If you relapse, do not give up! Plan ahead and think about what you will do the next time you get the urge to smoke.  HOW WILL I FEEL WHEN I QUIT SMOKING?  You may have symptoms of withdrawal because your body is used to nicotine (the addictive substance in cigarettes). You may crave cigarettes, be irritable, feel very hungry, cough often, get headaches, or have difficulty concentrating. The withdrawal symptoms are only temporary. They are strongest when you first quit but will go away within 10-14 days. When withdrawal symptoms occur, stay in control. Think about your reasons for quitting. Remind yourself that these are signs that your body is healing and getting used to being without cigarettes. Remember that withdrawal symptoms are easier to treat than the major diseases that smoking can cause.   Even after the withdrawal is over, expect periodic urges to smoke. However, these cravings are generally short lived and will go away whether you smoke or not. Do not smoke!  WHAT RESOURCES ARE AVAILABLE TO HELP ME QUIT SMOKING?  Your health care provider can direct you to community resources or hospitals for support, which may include:  Group support.  Education.  Hypnosis.  Therapy.      Food Choices to Lower Your Triglycerides  Triglycerides are a type of fat in your blood. High levels of triglycerides can increase the risk of heart disease and stroke. If your triglyceride levels are high, the foods you eat and " "your eating habits are very important. Choosing the right foods can help lower your triglycerides.   WHAT GENERAL GUIDELINES DO I NEED TO FOLLOW?  · Lose weight if you are overweight.    · Limit or avoid alcohol.    · Fill one half of your plate with vegetables and green salads.    · Limit fruit to two servings a day. Choose fruit instead of juice.    · Make one fourth of your plate whole grains. Look for the word \"whole\" as the first word in the ingredient list.  · Fill one fourth of your plate with lean protein foods.  · Enjoy fatty fish (such as salmon, mackerel, sardines, and tuna) three times a week.    · Choose healthy fats.    · Limit foods high in starch and sugar.  · Eat more home-cooked food and less restaurant, buffet, and fast food.  · Limit fried foods.  · Cook foods using methods other than frying.  · Limit saturated fats.  · Check ingredient lists to avoid foods with partially hydrogenated oils (trans fats) in them.  WHAT FOODS CAN I EAT?   Grains  Whole grains, such as whole wheat or whole grain breads, crackers, cereals, and pasta. Unsweetened oatmeal, bulgur, barley, quinoa, or brown rice. Corn or whole wheat flour tortillas.   Vegetables  Fresh or frozen vegetables (raw, steamed, roasted, or grilled). Green salads.  Fruits  All fresh, canned (in natural juice), or frozen fruits.  Meat and Other Protein Products  Ground beef (85% or leaner), grass-fed beef, or beef trimmed of fat. Skinless chicken or turkey. Ground chicken or turkey. Pork trimmed of fat. All fish and seafood. Eggs. Dried beans, peas, or lentils. Unsalted nuts or seeds. Unsalted canned or dry beans.  Dairy  Low-fat dairy products, such as skim or 1% milk, 2% or reduced-fat cheeses, low-fat ricotta or cottage cheese, or plain low-fat yogurt.  Fats and Oils  Tub margarines without trans fats. Light or reduced-fat mayonnaise and salad dressings. Avocado. Safflower, olive, or canola oils. Natural peanut or almond butter.  The items " listed above may not be a complete list of recommended foods or beverages. Contact your dietitian for more options.  WHAT FOODS ARE NOT RECOMMENDED?   Grains  White bread. White pasta. White rice. Cornbread. Bagels, pastries, and croissants. Crackers that contain trans fat.  Vegetables  White potatoes. Corn. Creamed or fried vegetables. Vegetables in a cheese sauce.  Fruits  Dried fruits. Canned fruit in light or heavy syrup. Fruit juice.  Meat and Other Protein Products  Fatty cuts of meat. Ribs, chicken wings, young, sausage, bologna, salami, chitterlings, fatback, hot dogs, bratwurst, and packaged luncheon meats.  Dairy  Whole or 2% milk, cream, half-and-half, and cream cheese. Whole-fat or sweetened yogurt. Full-fat cheeses. Nondairy creamers and whipped toppings. Processed cheese, cheese spreads, or cheese curds.  Sweets and Desserts  Corn syrup, sugars, honey, and molasses. Candy. Jam and jelly. Syrup. Sweetened cereals. Cookies, pies, cakes, donuts, muffins, and ice cream.  Fats and Oils  Butter, stick margarine, lard, shortening, ghee, or young fat. Coconut, palm kernel, or palm oils.  Beverages  Alcohol. Sweetened drinks (such as sodas, lemonade, and fruit drinks or punches).  The items listed above may not be a complete list of foods and beverages to avoid. Contact your dietitian for more information.     This information is not intended to replace advice given to you by your health care provider. Make sure you discuss any questions you have with your health care provider.     Document Released: 10/05/2005 Document Revised: 01/08/2016 Document Reviewed: 10/22/2014  [employer] Patient Education ©2016 [employer]

## 2017-04-09 NOTE — PROGRESS NOTES
Pt in bed, A&Ox4, pt states he has 6/10 pain but denied any pain medications, educated pt on call light use for assistance and pt understood, hourly rounding in place, personal belongings within reach.

## 2017-04-09 NOTE — PROGRESS NOTES
Pt educated on discharge instructions, no further need at this time, provided prescriptions, Taxi voucher to [facility], taken via wheelchair.

## 2017-04-09 NOTE — PROGRESS NOTES
Patient has not slept this shift as of this hour; requested and received Valium 5mg p.o. (see mar). Patient has exhibited restlessness however, has had no further outburst of agitation.

## 2017-04-10 NOTE — DISCHARGE SUMMARY
PRIMARY CARE PROVIDER:  CASTRO Marquez    NEUROSURGEON:  Christopher Calderon MD    RECOMMENDATIONS:  Would recommend follow up with PCP in 1 week's time to   recheck on methamphetamine withdrawal symptoms and adherent to treatment   program.  Follow up with Dr. Calderon as scheduled for previous neurosurgical   procedures.    CODE STATUS:  Full code status.    ALLERGIES:  No known drug allergies.  He does have ____ irritation allergy.    DISCHARGE MEDICATIONS:  Ativan 1 mg every 4 hours as needed for anxiety, #30,   no refills, nicotine patch 14 mg 1 patch to skin daily every 24 hours,   gabapentin 600 mg 3 times daily for chronic nerve pain, albuterol inhaler 2   puffs every 6 hours as needed for shortness of breath, omeprazole 20 mg at   bedtime, oxycodone/acetaminophen 5/325 one to two tablets every 4 hours as   needed for pain.  Patient states he has enough pain medications and was not   requesting a prescription at the time of discharge.  Discontinue the Valium   and no longer needs to continue with daptomycin, IV antibiotics.    CONSULTATIONS:  Dr. Calderon and Dr. Gomez, infectious disease.    DISCHARGE DIAGNOSES:  1.  Altered mental status appears to be due to methamphetamine withdrawal with   positive methamphetamine on urine drug screen and patient readily admitting   that he had been injecting IV methamphetamine into his PICC line of the right   arm.   2.  Chronic back pain, no new acute infection seen per Dr. Calderon and Dr. Gomez's review of the MRI of lumbar spine.  The patient also had an MRA of   brain with and without C-spine, T spine as well showing no acute infection.  3.  Current IV methamphetamine abuse on 04/07/2017.  PICC line was removed,   cath tip cultured, so far negative.  Blood cultures negative.  Patient at high   risk for outpatient PICC lines due to his IV methamphetamine abuse.    Hepatitis negative, HIV by PCR is still pending at the time of this dictation.    Will need a  followup for results.  Hepatitis A, B and C are negative.    HOSPITAL COURSE:  This 36-year-old male has a history of multiple back   surgeries, last surgery was on 02/24/2017 with Dr. Calderon.  He had a CSF leak   on 03/02/2017 and brought to the OR by Dr. Calderon.  CSF leak was sutured and   treated.  The OR cultures on 03/02/2017 showed coag-negative Staphylococcus   aureus.  ID was consulted.  He was placed on daptomycin IV for 6 weeks via   PICC line.  He was brought in to the emergency room initially for confusion at   home.  Wife states he ____ bizarre activity.  He was alert and oriented;   however, on exam in the ER.  He did have MRI of spine, L spine, C spine, T   spine, MRI of brain with contrast, which did not show any acute infection.    Reviewed by Dr. Calderon, reviewed by Dr. Gomez, infectious disease.  His PICC   line was removed on 04/07/2017.  Catheter tip was cultured.  Blood cultures   x2 negative.  He did receive 1 dose of IV vancomycin on admission, but was   deemed to be resolved from his coag-negative Staph aureus infection.  It was   found that the patient described that he was injecting IV methamphetamine   through his PICC line.  I did get consent from the patient to speak with his   wife, who was calling about results.  I did explain that the negative evidence   for infection of his lumbar spine and his bizarre behavior, most likely, is   due to methamphetamine withdrawal.  She did not realize that he was injecting   his IV PICC line.  I did recommend that to send home with Ativan p.o. for   acute methamphetamine withdrawal.  He understands he needs to go to [facility]   for outpatient rehab.  Apparently, he has been to [facility] in the past for   rehab.  His PICC line site was no erythema, no abscess.  He did not appear to   have any other skin abscesses.  His CBC within normal limits and his CMP all   within normal limits.  At baseline, the patient does have a cane for left foot   drop,  and he admits to having chronic back pain, but his back incision site   appeared to be clean, dry and intact.  He had no pain or bogginess with   palpation of the lumbar spine at the area of incision.  It was felt safe for   discharge to home with a methamphetamine abuse rehab and of course smoking   cessation is always advised.    Discharge time is 45 minutes.       ____________________________________     MD ANTONIO Hernandez / MASOUD    DD:  04/09/2017 16:02:51  DT:  04/09/2017 17:04:14    D#:  [id number]  Job#:  [id number]

## 2017-04-12 ENCOUNTER — HOSPITAL ENCOUNTER (OUTPATIENT)
Facility: MEDICAL CENTER | Age: 37
End: 2017-04-14
Attending: HOSPITALIST | Admitting: HOSPITALIST
Payer: MEDICAID

## 2017-04-12 ENCOUNTER — HOSPITAL ENCOUNTER (EMERGENCY)
Facility: MEDICAL CENTER | Age: 37
End: 2017-04-12
Attending: EMERGENCY MEDICINE
Payer: OTHER MISCELLANEOUS

## 2017-04-12 ENCOUNTER — RESOLUTE PROFESSIONAL BILLING HOSPITAL PROF FEE (OUTPATIENT)
Dept: HOSPITALIST | Facility: MEDICAL CENTER | Age: 37
End: 2017-04-12
Payer: MEDICAID

## 2017-04-12 ENCOUNTER — APPOINTMENT (OUTPATIENT)
Dept: RADIOLOGY | Facility: MEDICAL CENTER | Age: 37
End: 2017-04-12
Attending: EMERGENCY MEDICINE
Payer: OTHER MISCELLANEOUS

## 2017-04-12 ENCOUNTER — TELEPHONE (OUTPATIENT)
Dept: INFECTIOUS DISEASES | Facility: MEDICAL CENTER | Age: 37
End: 2017-04-12

## 2017-04-12 VITALS
RESPIRATION RATE: 16 BRPM | OXYGEN SATURATION: 94 % | TEMPERATURE: 98.1 F | HEART RATE: 89 BPM | SYSTOLIC BLOOD PRESSURE: 120 MMHG | DIASTOLIC BLOOD PRESSURE: 80 MMHG | BODY MASS INDEX: 27.95 KG/M2 | HEIGHT: 72 IN | WEIGHT: 206.35 LBS

## 2017-04-12 DIAGNOSIS — G89.29 CHRONIC LEFT-SIDED LOW BACK PAIN WITH LEFT-SIDED SCIATICA: ICD-10-CM

## 2017-04-12 DIAGNOSIS — R41.0 DISORIENTATION: ICD-10-CM

## 2017-04-12 DIAGNOSIS — M54.42 CHRONIC LEFT-SIDED LOW BACK PAIN WITH LEFT-SIDED SCIATICA: ICD-10-CM

## 2017-04-12 PROBLEM — M54.50 ACUTE EXACERBATION OF CHRONIC LOW BACK PAIN: Status: ACTIVE | Noted: 2017-04-12

## 2017-04-12 LAB
ALBUMIN SERPL BCP-MCNC: 3.9 G/DL (ref 3.2–4.9)
ALBUMIN/GLOB SERPL: 1.3 G/DL
ALP SERPL-CCNC: 67 U/L (ref 30–99)
ALT SERPL-CCNC: 34 U/L (ref 2–50)
AMPHETAMINES UR QL: NEGATIVE
ANION GAP SERPL CALC-SCNC: 7 MMOL/L (ref 0–11.9)
APPEARANCE UR: CLEAR
AST SERPL-CCNC: 35 U/L (ref 12–45)
BACTERIA BLD CULT: NORMAL
BACTERIA BLD CULT: NORMAL
BARBITURATES UR QL SCN: NEGATIVE
BASOPHILS # BLD AUTO: 0.9 % (ref 0–1.8)
BASOPHILS # BLD: 0.07 K/UL (ref 0–0.12)
BENZODIAZ UR QL SCN: POSITIVE
BILIRUB SERPL-MCNC: 0.5 MG/DL (ref 0.1–1.5)
BILIRUB UR QL STRIP.AUTO: NEGATIVE
BUN SERPL-MCNC: 7 MG/DL (ref 8–22)
BZE UR QL SCN: NEGATIVE
CALCIUM SERPL-MCNC: 9 MG/DL (ref 8.4–10.2)
CHLORIDE SERPL-SCNC: 104 MMOL/L (ref 96–112)
CO2 SERPL-SCNC: 26 MMOL/L (ref 20–33)
COLOR UR: YELLOW
CREAT SERPL-MCNC: 0.73 MG/DL (ref 0.5–1.4)
EOSINOPHIL # BLD AUTO: 0.21 K/UL (ref 0–0.51)
EOSINOPHIL NFR BLD: 2.7 % (ref 0–6.9)
ERYTHROCYTE [DISTWIDTH] IN BLOOD BY AUTOMATED COUNT: 37.2 FL (ref 35.9–50)
GFR SERPL CREATININE-BSD FRML MDRD: >60 ML/MIN/1.73 M 2
GLOBULIN SER CALC-MCNC: 2.9 G/DL (ref 1.9–3.5)
GLUCOSE SERPL-MCNC: 101 MG/DL (ref 65–99)
GLUCOSE UR STRIP.AUTO-MCNC: NEGATIVE MG/DL
HCT VFR BLD AUTO: 50.7 % (ref 42–52)
HGB BLD-MCNC: 17.8 G/DL (ref 14–18)
HIV 1 PRO DNA BLD QL NAA+PROBE: NOT DETECTED
IMM GRANULOCYTES # BLD AUTO: 0.04 K/UL (ref 0–0.11)
IMM GRANULOCYTES NFR BLD AUTO: 0.5 % (ref 0–0.9)
KETONES UR STRIP.AUTO-MCNC: NEGATIVE MG/DL
LEUKOCYTE ESTERASE UR QL STRIP.AUTO: NEGATIVE
LYMPHOCYTES # BLD AUTO: 2.23 K/UL (ref 1–4.8)
LYMPHOCYTES NFR BLD: 28.4 % (ref 22–41)
MCH RBC QN AUTO: 28 PG (ref 27–33)
MCHC RBC AUTO-ENTMCNC: 35.1 G/DL (ref 33.7–35.3)
MCV RBC AUTO: 79.8 FL (ref 81.4–97.8)
MICRO URNS: NORMAL
MONOCYTES # BLD AUTO: 0.84 K/UL (ref 0–0.85)
MONOCYTES NFR BLD AUTO: 10.7 % (ref 0–13.4)
NEUTROPHILS # BLD AUTO: 4.47 K/UL (ref 1.82–7.42)
NEUTROPHILS NFR BLD: 56.8 % (ref 44–72)
NITRITE UR QL STRIP.AUTO: NEGATIVE
NRBC # BLD AUTO: 0 K/UL
NRBC BLD AUTO-RTO: 0 /100 WBC
PCP UR QL SCN: NEGATIVE
PH UR STRIP.AUTO: 6 [PH]
PLATELET # BLD AUTO: 301 K/UL (ref 164–446)
PMV BLD AUTO: 9.4 FL (ref 9–12.9)
POTASSIUM SERPL-SCNC: 4.2 MMOL/L (ref 3.6–5.5)
PROT SERPL-MCNC: 6.8 G/DL (ref 6–8.2)
PROT UR QL STRIP: NEGATIVE MG/DL
RBC # BLD AUTO: 6.35 M/UL (ref 4.7–6.1)
RBC UR QL AUTO: NEGATIVE
SIGNIFICANT IND 70042: NORMAL
SIGNIFICANT IND 70042: NORMAL
SITE SITE: NORMAL
SITE SITE: NORMAL
SODIUM SERPL-SCNC: 137 MMOL/L (ref 135–145)
SOURCE SOURCE: NORMAL
SOURCE SOURCE: NORMAL
SP GR UR STRIP.AUTO: <=1.005
UR OPIATES 2659: NEGATIVE
UR THC 2511T: POSITIVE
UR TRICYCLIC 2660: NEGATIVE
WBC # BLD AUTO: 7.9 K/UL (ref 4.8–10.8)

## 2017-04-12 PROCEDURE — 700102 HCHG RX REV CODE 250 W/ 637 OVERRIDE(OP): Performed by: HOSPITALIST

## 2017-04-12 PROCEDURE — 81003 URINALYSIS AUTO W/O SCOPE: CPT

## 2017-04-12 PROCEDURE — 96374 THER/PROPH/DIAG INJ IV PUSH: CPT

## 2017-04-12 PROCEDURE — 700111 HCHG RX REV CODE 636 W/ 250 OVERRIDE (IP): Performed by: HOSPITALIST

## 2017-04-12 PROCEDURE — A9270 NON-COVERED ITEM OR SERVICE: HCPCS | Performed by: HOSPITALIST

## 2017-04-12 PROCEDURE — 36415 COLL VENOUS BLD VENIPUNCTURE: CPT

## 2017-04-12 PROCEDURE — 72158 MRI LUMBAR SPINE W/O & W/DYE: CPT

## 2017-04-12 PROCEDURE — 85025 COMPLETE CBC W/AUTO DIFF WBC: CPT

## 2017-04-12 PROCEDURE — 80305 DRUG TEST PRSMV DIR OPT OBS: CPT

## 2017-04-12 PROCEDURE — 700111 HCHG RX REV CODE 636 W/ 250 OVERRIDE (IP): Performed by: EMERGENCY MEDICINE

## 2017-04-12 PROCEDURE — 94760 N-INVAS EAR/PLS OXIMETRY 1: CPT

## 2017-04-12 PROCEDURE — G0378 HOSPITAL OBSERVATION PER HR: HCPCS

## 2017-04-12 PROCEDURE — A9579 GAD-BASE MR CONTRAST NOS,1ML: HCPCS | Performed by: EMERGENCY MEDICINE

## 2017-04-12 PROCEDURE — 80053 COMPREHEN METABOLIC PANEL: CPT

## 2017-04-12 PROCEDURE — 99220 PR INITIAL OBSERVATION CARE,LEVL III: CPT | Performed by: HOSPITALIST

## 2017-04-12 PROCEDURE — 700105 HCHG RX REV CODE 258: Performed by: HOSPITALIST

## 2017-04-12 PROCEDURE — 96375 TX/PRO/DX INJ NEW DRUG ADDON: CPT

## 2017-04-12 PROCEDURE — 700117 HCHG RX CONTRAST REV CODE 255: Performed by: EMERGENCY MEDICINE

## 2017-04-12 PROCEDURE — 99285 EMERGENCY DEPT VISIT HI MDM: CPT

## 2017-04-12 RX ORDER — SODIUM CHLORIDE 9 MG/ML
INJECTION, SOLUTION INTRAVENOUS CONTINUOUS
Status: DISCONTINUED | OUTPATIENT
Start: 2017-04-12 | End: 2017-04-14 | Stop reason: HOSPADM

## 2017-04-12 RX ORDER — ONDANSETRON 2 MG/ML
4 INJECTION INTRAMUSCULAR; INTRAVENOUS EVERY 4 HOURS PRN
Status: CANCELLED | OUTPATIENT
Start: 2017-04-12

## 2017-04-12 RX ORDER — ACETAMINOPHEN 325 MG/1
650 TABLET ORAL EVERY 6 HOURS
Status: DISCONTINUED | OUTPATIENT
Start: 2017-04-13 | End: 2017-04-14 | Stop reason: HOSPADM

## 2017-04-12 RX ORDER — DIPHENHYDRAMINE HYDROCHLORIDE 50 MG/ML
25 INJECTION INTRAMUSCULAR; INTRAVENOUS ONCE
Status: COMPLETED | OUTPATIENT
Start: 2017-04-12 | End: 2017-04-12

## 2017-04-12 RX ORDER — ONDANSETRON 4 MG/1
4 TABLET, ORALLY DISINTEGRATING ORAL EVERY 4 HOURS PRN
Status: CANCELLED | OUTPATIENT
Start: 2017-04-12

## 2017-04-12 RX ORDER — OXYCODONE HYDROCHLORIDE 5 MG/1
10 TABLET ORAL EVERY 4 HOURS PRN
Status: CANCELLED | OUTPATIENT
Start: 2017-04-12

## 2017-04-12 RX ORDER — OXYCODONE HYDROCHLORIDE 10 MG/1
10 TABLET ORAL EVERY 4 HOURS PRN
Status: DISCONTINUED | OUTPATIENT
Start: 2017-04-12 | End: 2017-04-14 | Stop reason: HOSPADM

## 2017-04-12 RX ORDER — BISACODYL 10 MG
10 SUPPOSITORY, RECTAL RECTAL
Status: DISCONTINUED | OUTPATIENT
Start: 2017-04-12 | End: 2017-04-14 | Stop reason: HOSPADM

## 2017-04-12 RX ORDER — PROMETHAZINE HYDROCHLORIDE 25 MG/1
12.5-25 TABLET ORAL EVERY 4 HOURS PRN
Status: DISCONTINUED | OUTPATIENT
Start: 2017-04-12 | End: 2017-04-14 | Stop reason: HOSPADM

## 2017-04-12 RX ORDER — POLYETHYLENE GLYCOL 3350 17 G/17G
1 POWDER, FOR SOLUTION ORAL
Status: DISCONTINUED | OUTPATIENT
Start: 2017-04-12 | End: 2017-04-14 | Stop reason: HOSPADM

## 2017-04-12 RX ORDER — OXYCODONE HYDROCHLORIDE 5 MG/1
10 TABLET ORAL EVERY 4 HOURS PRN
Status: DISCONTINUED | OUTPATIENT
Start: 2017-04-12 | End: 2017-04-12 | Stop reason: HOSPADM

## 2017-04-12 RX ORDER — ONDANSETRON 2 MG/ML
4 INJECTION INTRAMUSCULAR; INTRAVENOUS EVERY 4 HOURS PRN
Status: DISCONTINUED | OUTPATIENT
Start: 2017-04-12 | End: 2017-04-14 | Stop reason: HOSPADM

## 2017-04-12 RX ORDER — GABAPENTIN 300 MG/1
600 CAPSULE ORAL 3 TIMES DAILY
Status: CANCELLED | OUTPATIENT
Start: 2017-04-12

## 2017-04-12 RX ORDER — OXYCODONE HYDROCHLORIDE 5 MG/1
5 TABLET ORAL EVERY 4 HOURS PRN
Status: DISCONTINUED | OUTPATIENT
Start: 2017-04-12 | End: 2017-04-14 | Stop reason: HOSPADM

## 2017-04-12 RX ORDER — ONDANSETRON 4 MG/1
4 TABLET, ORALLY DISINTEGRATING ORAL EVERY 4 HOURS PRN
Status: DISCONTINUED | OUTPATIENT
Start: 2017-04-12 | End: 2017-04-14 | Stop reason: HOSPADM

## 2017-04-12 RX ORDER — GABAPENTIN 300 MG/1
600 CAPSULE ORAL 3 TIMES DAILY
Status: DISCONTINUED | OUTPATIENT
Start: 2017-04-13 | End: 2017-04-13

## 2017-04-12 RX ORDER — AMOXICILLIN 250 MG
2 CAPSULE ORAL 2 TIMES DAILY
Status: DISCONTINUED | OUTPATIENT
Start: 2017-04-13 | End: 2017-04-14 | Stop reason: HOSPADM

## 2017-04-12 RX ORDER — NICOTINE 21 MG/24HR
14 PATCH, TRANSDERMAL 24 HOURS TRANSDERMAL
Status: DISCONTINUED | OUTPATIENT
Start: 2017-04-13 | End: 2017-04-14 | Stop reason: HOSPADM

## 2017-04-12 RX ORDER — SODIUM CHLORIDE 9 MG/ML
INJECTION, SOLUTION INTRAVENOUS CONTINUOUS
Status: CANCELLED | OUTPATIENT
Start: 2017-04-12

## 2017-04-12 RX ORDER — OXYCODONE HYDROCHLORIDE 5 MG/1
5 TABLET ORAL EVERY 4 HOURS PRN
Status: CANCELLED | OUTPATIENT
Start: 2017-04-12

## 2017-04-12 RX ORDER — LORAZEPAM 1 MG/1
1 TABLET ORAL EVERY 6 HOURS PRN
Status: CANCELLED | OUTPATIENT
Start: 2017-04-12

## 2017-04-12 RX ORDER — ONDANSETRON 2 MG/ML
4 INJECTION INTRAMUSCULAR; INTRAVENOUS EVERY 4 HOURS PRN
Status: DISCONTINUED | OUTPATIENT
Start: 2017-04-12 | End: 2017-04-12 | Stop reason: HOSPADM

## 2017-04-12 RX ORDER — KETOROLAC TROMETHAMINE 30 MG/ML
30 INJECTION, SOLUTION INTRAMUSCULAR; INTRAVENOUS EVERY 6 HOURS PRN
Status: DISCONTINUED | OUTPATIENT
Start: 2017-04-12 | End: 2017-04-14 | Stop reason: HOSPADM

## 2017-04-12 RX ORDER — ACETAMINOPHEN 325 MG/1
650 TABLET ORAL EVERY 6 HOURS
Status: DISCONTINUED | OUTPATIENT
Start: 2017-04-12 | End: 2017-04-12 | Stop reason: HOSPADM

## 2017-04-12 RX ORDER — PROMETHAZINE HYDROCHLORIDE 25 MG/1
12.5-25 SUPPOSITORY RECTAL EVERY 4 HOURS PRN
Status: CANCELLED | OUTPATIENT
Start: 2017-04-12

## 2017-04-12 RX ORDER — BISACODYL 10 MG
10 SUPPOSITORY, RECTAL RECTAL
Status: CANCELLED | OUTPATIENT
Start: 2017-04-12

## 2017-04-12 RX ORDER — KETOROLAC TROMETHAMINE 30 MG/ML
30 INJECTION, SOLUTION INTRAMUSCULAR; INTRAVENOUS EVERY 6 HOURS PRN
Status: CANCELLED | OUTPATIENT
Start: 2017-04-12 | End: 2017-04-17

## 2017-04-12 RX ORDER — POLYETHYLENE GLYCOL 3350 17 G/17G
1 POWDER, FOR SOLUTION ORAL
Status: CANCELLED | OUTPATIENT
Start: 2017-04-12

## 2017-04-12 RX ORDER — ACETAMINOPHEN 325 MG/1
650 TABLET ORAL EVERY 6 HOURS
Status: CANCELLED | OUTPATIENT
Start: 2017-04-12

## 2017-04-12 RX ORDER — LORAZEPAM 1 MG/1
1 TABLET ORAL EVERY 6 HOURS PRN
Status: DISCONTINUED | OUTPATIENT
Start: 2017-04-12 | End: 2017-04-14 | Stop reason: HOSPADM

## 2017-04-12 RX ORDER — OMEPRAZOLE 20 MG/1
20 CAPSULE, DELAYED RELEASE ORAL DAILY
COMMUNITY

## 2017-04-12 RX ORDER — POLYETHYLENE GLYCOL 3350 17 G/17G
1 POWDER, FOR SOLUTION ORAL
Status: DISCONTINUED | OUTPATIENT
Start: 2017-04-12 | End: 2017-04-12 | Stop reason: HOSPADM

## 2017-04-12 RX ORDER — OXYCODONE HYDROCHLORIDE 5 MG/1
5 TABLET ORAL EVERY 4 HOURS PRN
Status: DISCONTINUED | OUTPATIENT
Start: 2017-04-12 | End: 2017-04-12 | Stop reason: HOSPADM

## 2017-04-12 RX ORDER — PROMETHAZINE HYDROCHLORIDE 25 MG/1
12.5-25 TABLET ORAL EVERY 4 HOURS PRN
Status: CANCELLED | OUTPATIENT
Start: 2017-04-12

## 2017-04-12 RX ORDER — KETOROLAC TROMETHAMINE 30 MG/ML
30 INJECTION, SOLUTION INTRAMUSCULAR; INTRAVENOUS ONCE
Status: COMPLETED | OUTPATIENT
Start: 2017-04-12 | End: 2017-04-12

## 2017-04-12 RX ORDER — LORAZEPAM 1 MG/1
1 TABLET ORAL EVERY 6 HOURS PRN
Status: DISCONTINUED | OUTPATIENT
Start: 2017-04-12 | End: 2017-04-12 | Stop reason: HOSPADM

## 2017-04-12 RX ORDER — SODIUM CHLORIDE 9 MG/ML
INJECTION, SOLUTION INTRAVENOUS CONTINUOUS
Status: DISCONTINUED | OUTPATIENT
Start: 2017-04-12 | End: 2017-04-12 | Stop reason: HOSPADM

## 2017-04-12 RX ORDER — PROMETHAZINE HYDROCHLORIDE 12.5 MG/1
12.5-25 SUPPOSITORY RECTAL EVERY 4 HOURS PRN
Status: DISCONTINUED | OUTPATIENT
Start: 2017-04-12 | End: 2017-04-14 | Stop reason: HOSPADM

## 2017-04-12 RX ORDER — NICOTINE 21 MG/24HR
14 PATCH, TRANSDERMAL 24 HOURS TRANSDERMAL
Status: CANCELLED | OUTPATIENT
Start: 2017-04-13

## 2017-04-12 RX ORDER — GABAPENTIN 300 MG/1
600 CAPSULE ORAL 3 TIMES DAILY
Status: DISCONTINUED | OUTPATIENT
Start: 2017-04-12 | End: 2017-04-12 | Stop reason: HOSPADM

## 2017-04-12 RX ORDER — AMOXICILLIN 250 MG
2 CAPSULE ORAL 2 TIMES DAILY
Status: DISCONTINUED | OUTPATIENT
Start: 2017-04-12 | End: 2017-04-12 | Stop reason: HOSPADM

## 2017-04-12 RX ORDER — AMOXICILLIN 250 MG
2 CAPSULE ORAL 2 TIMES DAILY
Status: CANCELLED | OUTPATIENT
Start: 2017-04-12

## 2017-04-12 RX ORDER — KETOROLAC TROMETHAMINE 30 MG/ML
30 INJECTION, SOLUTION INTRAMUSCULAR; INTRAVENOUS EVERY 6 HOURS PRN
Status: DISCONTINUED | OUTPATIENT
Start: 2017-04-12 | End: 2017-04-12 | Stop reason: HOSPADM

## 2017-04-12 RX ORDER — ONDANSETRON 4 MG/1
4 TABLET, ORALLY DISINTEGRATING ORAL EVERY 4 HOURS PRN
Status: DISCONTINUED | OUTPATIENT
Start: 2017-04-12 | End: 2017-04-12 | Stop reason: HOSPADM

## 2017-04-12 RX ORDER — NICOTINE 21 MG/24HR
14 PATCH, TRANSDERMAL 24 HOURS TRANSDERMAL
Status: DISCONTINUED | OUTPATIENT
Start: 2017-04-13 | End: 2017-04-12 | Stop reason: HOSPADM

## 2017-04-12 RX ORDER — MORPHINE SULFATE 4 MG/ML
4 INJECTION, SOLUTION INTRAMUSCULAR; INTRAVENOUS ONCE
Status: COMPLETED | OUTPATIENT
Start: 2017-04-12 | End: 2017-04-12

## 2017-04-12 RX ORDER — BISACODYL 10 MG
10 SUPPOSITORY, RECTAL RECTAL
Status: DISCONTINUED | OUTPATIENT
Start: 2017-04-12 | End: 2017-04-12 | Stop reason: HOSPADM

## 2017-04-12 RX ORDER — PROMETHAZINE HYDROCHLORIDE 25 MG/1
12.5-25 TABLET ORAL EVERY 4 HOURS PRN
Status: DISCONTINUED | OUTPATIENT
Start: 2017-04-12 | End: 2017-04-12 | Stop reason: HOSPADM

## 2017-04-12 RX ORDER — PROMETHAZINE HYDROCHLORIDE 25 MG/1
12.5-25 SUPPOSITORY RECTAL EVERY 4 HOURS PRN
Status: DISCONTINUED | OUTPATIENT
Start: 2017-04-12 | End: 2017-04-12 | Stop reason: HOSPADM

## 2017-04-12 RX ADMIN — DIPHENHYDRAMINE HYDROCHLORIDE 25 MG: 50 INJECTION, SOLUTION INTRAMUSCULAR; INTRAVENOUS at 16:44

## 2017-04-12 RX ADMIN — LORAZEPAM 1 MG: 1 TABLET ORAL at 23:09

## 2017-04-12 RX ADMIN — KETOROLAC TROMETHAMINE 30 MG: 30 INJECTION, SOLUTION INTRAMUSCULAR; INTRAVENOUS at 23:09

## 2017-04-12 RX ADMIN — KETOROLAC TROMETHAMINE 30 MG: 30 INJECTION, SOLUTION INTRAMUSCULAR; INTRAVENOUS at 16:39

## 2017-04-12 RX ADMIN — SODIUM CHLORIDE: 9 INJECTION, SOLUTION INTRAVENOUS at 23:08

## 2017-04-12 RX ADMIN — MORPHINE SULFATE 4 MG: 4 INJECTION INTRAVENOUS at 16:44

## 2017-04-12 RX ADMIN — OXYCODONE HYDROCHLORIDE 10 MG: 5 TABLET ORAL at 20:32

## 2017-04-12 RX ADMIN — ACETAMINOPHEN 650 MG: 325 TABLET, FILM COATED ORAL at 23:09

## 2017-04-12 RX ADMIN — GADODIAMIDE 20 ML: 287 INJECTION INTRAVENOUS at 17:51

## 2017-04-12 ASSESSMENT — LIFESTYLE VARIABLES
EVER_SMOKED: YES
ALCOHOL_USE: NO

## 2017-04-12 ASSESSMENT — PAIN SCALES - GENERAL
PAINLEVEL_OUTOF10: 9

## 2017-04-12 NOTE — ED NOTES
Reports severe pain, Left sided pain which radiates from buttock to foot. Pain also wraps to groin area. Pt reports pain has been getting worse x 1 week. Pt had surgery 2/26 and 3/3 (back surgery) also reports he had an infection at that time. Reports heis unable to shpwer self or do anything due to pain

## 2017-04-12 NOTE — IP AVS SNAPSHOT
Home Care Instructions                                                                                                                  Name:Rodriguez Kramer  Medical Record Number:[id number]  CSN: [id number]    YOB: 1980   Age: 36 y.o.  Sex: male  HT:  WT: 93.5 kg (206 lb 2.1 oz)          Admit Date: 4/12/2017     Discharge Date:   Today's Date: 4/14/2017  Attending Doctor:  Hailey Shields M.D.                  Allergies:  Nkda and Tape            Discharge Instructions       Discharge Instructions    Discharged to home by car with relative. Discharged via wheelchair, hospital escort: Yes.  Special equipment needed: Cane    Be sure to schedule a follow-up appointment with your primary care doctor or any specialists as instructed.     Discharge Plan:   Smoking Cessation Offered: Patient Counseled  Influenza Vaccine Indication: Not indicated: Previously immunized this influenza season and > 8 years of age    I understand that a diet low in cholesterol, fat, and sodium is recommended for good health. Unless I have been given specific instructions below for another diet, I accept this instruction as my diet prescription.   Other diet: Unrestricted    Special Instructions: None    · Is patient discharged on Warfarin / Coumadin?   No     · Is patient Post Blood Transfusion?  No    Depression / Suicide Risk    As you are discharged from this [facility] facility, it is important to learn how to keep safe from harming yourself.    Recognize the warning signs:  · Abrupt changes in personality, positive or negative- including increase in energy   · Giving away possessions  · Change in eating patterns- significant weight changes-  positive or negative  · Change in sleeping patterns- unable to sleep or sleeping all the time   · Unwillingness or inability to communicate  · Depression  · Unusual sadness, discouragement and loneliness  · Talk of wanting to die  · Neglect of personal appearance   · Rebelliousness-  reckless behavior  · Withdrawal from people/activities they love  · Confusion- inability to concentrate     If you or a loved one observes any of these behaviors or has concerns about self-harm, here's what you can do:  · Talk about it- your feelings and reasons for harming yourself  · Remove any means that you might use to hurt yourself (examples: pills, rope, extension cords, firearm)  · Get professional help from the community (Mental Health, Substance Abuse, psychological counseling)  · Do not be alone:Call your Safe Contact- someone whom you trust who will be there for you.  · Call your local CRISIS HOTLINE 640-9708 or 797-052-2209  · Call your local Children's Mobile Crisis Response Team [place] (950) 597-7401 or www.Kaymu  · Call the toll free National Suicide Prevention Hotlines   · National Suicide Prevention Lifeline 124-321-XRQQ (8249)  · [employer] Network 800-SUICIDE (316-6061)        Follow-up Information     1. Follow up with TERRIE Bush. Go on 4/27/2017.    Specialty:  Family Medicine    Why:  Please arrive at 8:50am for a 9:00am appointment. Thank you    Contact information    [address] [place] [address]  [phone]          2. Follow up with TERRIE Bush.    Specialty:  Family Medicine    Contact information    [address] [place] [address]  [phone]           Discharge Medication Instructions:    Below are the medications your physician expects you to take upon discharge:    Review all your home medications and newly ordered medications with your doctor and/or pharmacist. Follow medication instructions as directed by your doctor and/or pharmacist.    Please keep your medication list with you and share with your physician.               Medication List      START taking these medications        Instructions    Morning Afternoon Evening Bedtime    baclofen 10 MG Tabs   Last time this was given:  10 mg on 4/14/2017  9:08 AM      Commonly known as:  LIORESAL        Take 1 Tab by mouth 3 times a day.   Dose:  10 mg                        duloxetine 30 MG Cpep   Last time this was given:  30 mg on 4/14/2017  8:57 AM   Commonly known as:  CYMBALTA        Take 1 Cap by mouth every day.   Dose:  30 mg                          CONTINUE taking these medications        Instructions    Morning Afternoon Evening Bedtime    albuterol 108 (90 BASE) MCG/ACT Aers inhalation aerosol        Inhale 2 Puffs by mouth every 6 hours as needed for Shortness of Breath.   Dose:  2 Puff                        gabapentin 300 MG Caps   Last time this was given:  800 mg on 4/14/2017  8:57 AM   Commonly known as:  NEURONTIN        Take 2 Caps by mouth 3 times a day as needed.   Dose:  600 mg                        lorazepam 1 MG Tabs   Last time this was given:  1 mg on 4/13/2017  6:28 AM   Commonly known as:  ATIVAN        Take 1 Tab by mouth every four hours as needed for Anxiety.   Dose:  1 mg                        omeprazole 20 MG delayed-release capsule   Commonly known as:  PRILOSEC        Take 20 mg by mouth every day.   Dose:  20 mg                        oxycodone-acetaminophen 5-325 MG Tabs   Commonly known as:  PERCOCET        Take 1-2 Tabs by mouth every four hours as needed.   Dose:  1-2 Tab                             Where to Get Your Medications      Information about where to get these medications is not yet available     ! Ask your nurse or doctor about these medications    - baclofen 10 MG Tabs  - duloxetine 30 MG Cpep            Instructions           Diet / Nutrition:    Follow any diet instructions given to you by your doctor or the dietician, including how much salt (sodium) you are allowed each day.    If you are overweight, talk to your doctor about a weight reduction plan.    Activity:    Remain physically active following your doctor's instructions about exercise and activity.    Rest often.     Any time you become even a little tired or  short of breath, SIT DOWN and rest.    Worsening Symptoms:    Report any of the following signs and symptoms to the doctor's office immediately:    *Pain of jaw, arm, or neck  *Chest pain not relieved by medication                               *Dizziness or loss of consciousness  *Difficulty breathing even when at rest   *More tired than usual                                       *Bleeding drainage or swelling of surgical site  *Swelling of feet, ankles, legs or stomach                 *Fever (>100ºF)  *Pink or blood tinged sputum  *Weight gain (3lbs/day or 5lbs /week)           *Shock from internal defibrillator (if applicable)  *Palpitations or irregular heartbeats                *Cool and/or numb extremities    Stroke Awareness    Common Risk Factors for Stroke include:    Age  Atrial Fibrillation  Carotid Artery Stenosis  Diabetes Mellitus  Excessive alcohol consumption  High blood pressure  Overweight   Physical inactivity  Smoking    Warning signs and symptoms of a stroke include:    *Sudden numbness or weakness of the face, arm or leg (especially on one side of the body).  *Sudden confusion, trouble speaking or understanding.  *Sudden trouble seeing in one or both eyes.  *Sudden trouble walking, dizziness, loss of balance or coordination.Sudden severe headache with no known cause.    It is very important to get treatment quickly when a stroke occurs. If you experience any of the above warning signs, call 911 immediately.                   Disclaimer         Quit Smoking / Tobacco Use:    I understand the use of any tobacco products increases my chance of suffering from future heart disease or stroke and could cause other illnesses which may shorten my life. Quitting the use of tobacco products is the single most important thing I can do to improve my health. For further information on smoking / tobacco cessation call a Toll Free Quit Line at 1-367.790.7362 (*National Cancer [place]) or 1-502.121.2232  (American Lung Association) or you can access the web based program at www.lungusa.org.    [place] Tobacco Users Help Line:  (310) 610-4180       Toll Free: 1-154.770.6915  Quit Tobacco Program [facility] (894)902-3467    Crisis Hotline:    [place] Crisis Hotline:  1-674-KNWZPIU or 1-521.249.1661    [place] Crisis Hotline:    1-924.960.6830 or 068-334-2380    Discharge Survey:   Thank you for choosing [facility]. We hope we did everything we could to make your hospital stay a pleasant one. You may be receiving a phone survey and we would appreciate your time and participation in answering the questions. Your input is very valuable to us in our efforts to improve our service to our patients and their families.        My signature on this form indicates that:    1. I have reviewed and understand the above information.  2. My questions regarding this information have been answered to my satisfaction.  3. I have formulated a plan with my discharge nurse to obtain my prescribed medications for home.                  Disclaimer         __________________________________                     __________       ________                       Patient Signature                                                 Date                    Time

## 2017-04-12 NOTE — LETTER
[facility], EMERGENCY DEPT   [address] [place] [address]  Phone: Dept: [phone] - Fax:        [facility] Progress Report and Disability Certification  Date of Service: 4/12/2017   No Show:  No  Date / Time of Next Visit:     Claim Information   Patient Name: Rodriguez Kramer  Claim Number:     Employer:    Date of Injury: 12/3/2012     Insurer / TPA: Misc Workers Comp ID / SSN: [id number]    Occupation:  Diagnosis: Diagnoses of Chronic left-sided low back pain with left-sided sciatica and Disorientation were pertinent to this visit.    Medical Information   Related to Industrial Injury?   ***   Subjective Complaints:      Objective Findings:     Pre-Existing Condition(s):     Assessment:        Status:    Permanent Disability:     Plan:      Diagnostics:      Comments:       Disability Information   Status:      From:     Through:   Restrictions are:     Physical Restrictions   Sitting:    Standing:    Stooping:    Bending:      Squatting:    Walking:    Climbing:    Pushing:      Pulling:    Other:    Reaching Above Shoulder (L):   Reaching Above Shoulder (R):       Reaching Below Shoulder (L):    Reaching Below Shoulder (R):      Not to exceed Weight Limits   Carrying(hrs):   Weight Limit(lb):   Lifting(hrs):   Weight  Limit(lb):     Comments:      Repetitive Actions   Hands: i.e. Fine Manipulations from Grasping:     Feet: i.e. Operating Foot Controls:     Driving / Operate Machinery:     Physician Name: Jessica Holder Physician Signature:   e-Signature:  , Medical Director   Clinic Name / Location: [facility], EMERGENCY DEPT  [address] [place] [address]  [phone]     Clinic Phone Number: Dept: [phone]   Appointment Time:  Visit Start Time:    Check-In Time:  2:17 PM Visit Discharge Time:    Original-Treating Physician or Chiropractor    Page  2-Insurer/TPA    Page 3-Employer    Page 4-Employee

## 2017-04-12 NOTE — ED NOTES
The Medication Reconciliation process has been completed by interviewing the patient    Allergies have been reviewed  Antibiotic use in 30 days - NONE    Home Pharmacy:  [facility]

## 2017-04-12 NOTE — LETTER
[facility] [place] [facility], EMERGENCY DEPT   [address] [address]  Phone: Dept: [phone] - Fax:        [facility] Progress Report and Disability Certification  Date of Service: 4/12/2017   No Show:  No  Date / Time of Next Visit:     Claim Information   Patient Name: Rodriguez Kramer  Claim Number:     Employer:    Date of Injury: 12/3/2012     Insurer / TPA: Misc Workers Comp ID / SSN: [id number]    Occupation:  Diagnosis: Diagnoses of Chronic left-sided low back pain with left-sided sciatica and Disorientation were pertinent to this visit.    Medical Information   Related to Industrial Injury?   ***   Subjective Complaints:      Objective Findings:     Pre-Existing Condition(s):     Assessment:        Status:    Permanent Disability:     Plan:      Diagnostics:      Comments:       Disability Information   Status:      From:     Through:   Restrictions are:     Physical Restrictions   Sitting:    Standing:    Stooping:    Bending:      Squatting:    Walking:    Climbing:    Pushing:      Pulling:    Other:    Reaching Above Shoulder (L):   Reaching Above Shoulder (R):       Reaching Below Shoulder (L):    Reaching Below Shoulder (R):      Not to exceed Weight Limits   Carrying(hrs):   Weight Limit(lb):   Lifting(hrs):   Weight  Limit(lb):     Comments:      Repetitive Actions   Hands: i.e. Fine Manipulations from Grasping:     Feet: i.e. Operating Foot Controls:     Driving / Operate Machinery:     Physician Name: Jessica Holder Physician Signature:   e-Signature:  , Medical Director   Clinic Name / Location: [facility], EMERGENCY DEPT  [address] [place] [address]  [phone]     Clinic Phone Number: Dept: [phone]   Appointment Time:  Visit Start Time:    Check-In Time:  2:17 PM Visit Discharge Time:    Original-Treating Physician or Chiropractor    Page  2-Insurer/TPA    Page 3-Employer    Page 4-Employee

## 2017-04-12 NOTE — ED PROVIDER NOTES
ED Provider Note    CHIEF COMPLAINT  Chief Complaint   Patient presents with   • Flank Pain   • Leg Pain   • Back Pain       HPI  Rodriguez Kramer is a 36 y.o. male who presents severe chronic left back, buttock pain reading into his left groin and down to his foot. He has had 5 surgeries on this including 2 quite recently for her a left microdiscectomy followed by an incision and drainage due to a wound leak by Dr. Calderon. He is not on any narcotic pain medication home. He is currently taking Ativan for methamphetamine withdrawal because he was using methamphetamine through his PICC line for pain. He states the pain is gotten much worse over the last week. He tried to get in to see Dr. Ward, his previous pain management specialist, but is unable to be seen until 4/18. He states he cannot wait until then. His wife states she is unable to get him up to help her help him with his activities of daily living. His foot drop is worse. He denies any recent fevers, coughing, changes in his bowel or bladder habits. His wife states that he has been confused off and on recently and she thinks is due to the pain.    REVIEW OF SYSTEMS  See HPI for further details. All other systems are negative.    PAST MEDICAL HISTORY  Past Medical History   Diagnosis Date   • ASTHMA    • Bulging discs 12/2012       FAMILY HISTORY  History reviewed. No pertinent family history.    SOCIAL HISTORY  Social History     Social History   • Marital Status:      Spouse Name: N/A   • Number of Children: N/A   • Years of Education: N/A     Social History Main Topics   • Smoking status: Current Some Day Smoker -- 0.20 packs/day     Types: Cigarettes   • Smokeless tobacco: None   • Alcohol Use: No   • Drug Use: Yes     Special: Inhaled      Comment: Marijuana   • Sexual Activity: Not Asked     Other Topics Concern   • None     Social History Narrative       SURGICAL HISTORY  Past Surgical History   Procedure Laterality Date   • Lumbar laminectomy  diskectomy  11/25/08     Performed by SHAUNA JENKINS at SURGERY [facility]   • Other neurological surg       L 4-5 discectomy   • Lumbar laminectomy diskectomy  8/3/2014     Performed by Christopher Calderon M.D. at SURGERY [facility]   • Lumbar fusion posterior  6/15/2015     Procedure: LUMBAR FUSION POSTERIOR;  Surgeon: Christopher Calderon M.D.;  Location: SURGERY [facility];  Service:    • Lumbar laminectomy diskectomy Left 2/24/2017     Procedure: LEFT LUMBAR 5 - SACRAL 1 MICRODISKECTOMY;  Surgeon: Christopher Calderon M.D.;  Location: SURGERY [facility];  Service:    • Incision and drainage neuro  3/3/2017     Procedure: INCISION AND DRAINAGE NEURO-LUMBAR  WOUND, REPAIR OF CSF LEAK;  Surgeon: Christopher Calderon M.D.;  Location: SURGERY [facility];  Service:        CURRENT MEDICATIONS  No current facility-administered medications for this encounter.    Current outpatient prescriptions:   •  gabapentin (NEURONTIN) 300 MG Cap, Take 2 Caps by mouth 3 times a day as needed., Disp: 90 Cap, Rfl: 3  •  lorazepam (ATIVAN) 1 MG Tab, Take 1 Tab by mouth every four hours as needed for Anxiety., Disp: 30 Tab, Rfl: 0  •  albuterol 108 (90 BASE) MCG/ACT Aero Soln inhalation aerosol, Inhale 2 Puffs by mouth every 6 hours as needed for Shortness of Breath., Disp: , Rfl:       ALLERGIES  Allergies   Allergen Reactions   • Nkda [No Known Drug Allergy]    • Tape      Certain tape irritates his skin         PHYSICAL EXAM  VITAL SIGNS: /83 mmHg  Pulse 111  Temp(Src) 36.7 °C (98.1 °F)  Resp 20  Ht 1.829 m (6')  Wt 93.6 kg (206 lb 5.6 oz)  BMI 27.98 kg/m2  SpO2 98% @CAMMIE[200176::@   Constitutional: Well developed, Well nourished, No acute respiratory distress, Non-toxic appearance.   HENT: Normocephalic, Atraumatic, Bilateral external ears normal, Oropharynx clear, mucous membranes are moist.  Eyes: PERRLA, EOMI, Conjunctiva normal, No discharge. No icterus.  Neck: Normal range of motion. Supple, No stridor.  "  Lymphatic: No cervical lymphadenopathy noted.   Cardiovascular: Moderate tachycardia, Normal rhythm, No murmurs, No rubs, No gallops.   Thorax & Lungs: Clear to auscultation bilaterally, No respiratory distress, No wheezing.  Abdomen: Bowel sounds normal, Soft, No tenderness, No masses, no rebound or guarding   Skin: Warm, Dry, No erythema, No rash.   Extremities: Intact distal pulses, No edema, generalized tenderness to the dorsum of the left foot  Musculoskeletal: Unable to cooperate with a full exam due to pain  Neurologic: Asleep but wakes up crying. Decreased sensation to the dorsum of the left foot is chronic. Chronic left drop foot is present.  Psychiatric: Intermittently somnolent then crying and agitated, no active suicidal ideation, but the patient continues to say that he is \"done\"    EKG  Twelve-lead EKG by my interpretation shows normal sinus rhythm at a rate of 63. Normal axis. No pathologic Q waves. Good R-wave progression. No ST or T-wave changes to indicate ischemia or infarct. Similar morphology seen on 11/5/16 with no significant interim changes.    RADIOLOGY/PROCEDURES  MR-LUMBAR SPINE-WITH & W/O   Preliminary Result            COURSE & MEDICAL DECISION MAKING  Pertinent Labs & Imaging studies reviewed. (See chart for details)  I reviewed the patient's most recent discharge summary from our facility as the following:   Apr 7, 2017 Apr 9, 2017                       Discharge Summaries filed by Mariana Carrasquillo M.D. at 4/9/2017  5:37 PM / Draft: Not Electronically Signed      Author: Mariana Carrasquillo M.D. Service: (none) Author Type: Physician     Filed: 4/9/2017  5:37 PM Note Time: 4/9/2017 12:44 PM Note Type: Discharge Summaries     Status: Unsigned Transcription : Mariana Carrasquillo M.D. (Physician)     Trans ID: [id number] Trans Status: Unavailable Dictation Time: 4/9/2017  4:02 PM     Trans Time: 4/9/2017  5:04 PM Trans Doc Type: Discharge Summary       Expand All Collapse All    PRIMARY " CARE PROVIDER:  CASTRO Marquez     NEUROSURGEON:  Christopher Calderon MD     RECOMMENDATIONS:  Would recommend follow up with PCP in 1 week's time to    recheck on methamphetamine withdrawal symptoms and adherent to treatment    program.  Follow up with Dr. Calderon as scheduled for previous neurosurgical    procedures.     CODE STATUS:  Full code status.     ALLERGIES:  No known drug allergies.  He does have ____ irritation allergy.     DISCHARGE MEDICATIONS:  Ativan 1 mg every 4 hours as needed for anxiety, #30,    no refills, nicotine patch 14 mg 1 patch to skin daily every 24 hours,    gabapentin 600 mg 3 times daily for chronic nerve pain, albuterol inhaler 2    puffs every 6 hours as needed for shortness of breath, omeprazole 20 mg at    bedtime, oxycodone/acetaminophen 5/325 one to two tablets every 4 hours as    needed for pain.  Patient states he has enough pain medications and was not    requesting a prescription at the time of discharge.  Discontinue the Valium    and no longer needs to continue with daptomycin, IV antibiotics.     CONSULTATIONS:  Dr. Calderon and Dr. Gomez, infectious disease.     DISCHARGE DIAGNOSES:  1.  Altered mental status appears to be due to methamphetamine withdrawal with   positive methamphetamine on urine drug screen and patient readily admitting    that he had been injecting IV methamphetamine into his PICC line of the right    arm.    2.  Chronic back pain, no new acute infection seen per Dr. Calderon and Dr. Gomez's review of the MRI of lumbar spine.  The patient also had an MRA of    brain with and without C-spine, T spine as well showing no acute infection.  3.  Current IV methamphetamine abuse on 04/07/2017.  PICC line was removed,    cath tip cultured, so far negative.  Blood cultures negative.  Patient at high   risk for outpatient PICC lines due to his IV methamphetamine abuse.     Hepatitis negative, HIV by PCR is still pending at the time of this dictation.     Will need a followup for results.  Hepatitis A, B and C are negative.     HOSPITAL COURSE:  This 36-year-old male has a history of multiple back    surgeries, last surgery was on 02/24/2017 with Dr. Calderon.  He had a CSF leak    on 03/02/2017 and brought to the OR by Dr. Calderon.  CSF leak was sutured and    treated.  The OR cultures on 03/02/2017 showed coag-negative Staphylococcus    aureus.  ID was consulted.  He was placed on daptomycin IV for 6 weeks via    PICC line.  He was brought in to the emergency room initially for confusion at   home.  Wife states he ____ bizarre activity.  He was alert and oriented;    however, on exam in the ER.  He did have MRI of spine, L spine, C spine, T    spine, MRI of brain with contrast, which did not show any acute infection.     Reviewed by Dr. Calderon, reviewed by Dr. Gomez, infectious disease.  His PICC   line was removed on 04/07/2017.  Catheter tip was cultured.  Blood cultures    x2 negative.  He did receive 1 dose of IV vancomycin on admission, but was    deemed to be resolved from his coag-negative Staph aureus infection.  It was    found that the patient described that he was injecting IV methamphetamine    through his PICC line.  I did get consent from the patient to speak with his    wife, who was calling about results.  I did explain that the negative evidence   for infection of his lumbar spine and his bizarre behavior, most likely, is    due to methamphetamine withdrawal.  She did not realize that he was injecting    his IV PICC line.  I did recommend that to send home with Ativan p.o. for    acute methamphetamine withdrawal.  He understands he needs to go to [facility]    for outpatient rehab.  Apparently, he has been to [facility] in the past for    rehab.  His PICC line site was no erythema, no abscess.        I ordered IV Toradol, Benadryl, and morphine for the patient after initial evaluation. Urine drug screen was taken prior to administration of these  medications.    Results for orders placed or performed during the hospital encounter of 04/12/17   UR DRUG SCREEN(SO OROURKE ONLY)   Result Value Ref Range    Phencyclidine -Pcp Negative Negative    Benzodiazepines Positive (A) Negative    Cocaine Metabolite Negative Negative    Amphetamines By Triage Negative Negative    Urine THC Positive (A) Negative    Codeine-Morphine Negative Negative    Barbiturates Negative Negative    Tricyclic Antidepressants Negative Negative   CBC WITH DIFFERENTIAL   Result Value Ref Range    WBC 7.9 4.8 - 10.8 K/uL    RBC 6.35 (H) 4.70 - 6.10 M/uL    Hemoglobin 17.8 14.0 - 18.0 g/dL    Hematocrit 50.7 42.0 - 52.0 %    MCV 79.8 (L) 81.4 - 97.8 fL    MCH 28.0 27.0 - 33.0 pg    MCHC 35.1 33.7 - 35.3 g/dL    RDW 37.2 35.9 - 50.0 fL    Platelet Count 301 164 - 446 K/uL    MPV 9.4 9.0 - 12.9 fL    Neutrophils-Polys 56.80 44.00 - 72.00 %    Lymphocytes 28.40 22.00 - 41.00 %    Monocytes 10.70 0.00 - 13.40 %    Eosinophils 2.70 0.00 - 6.90 %    Basophils 0.90 0.00 - 1.80 %    Immature Granulocytes 0.50 0.00 - 0.90 %    Nucleated RBC 0.00 /100 WBC    Neutrophils (Absolute) 4.47 1.82 - 7.42 K/uL    Lymphs (Absolute) 2.23 1.00 - 4.80 K/uL    Monos (Absolute) 0.84 0.00 - 0.85 K/uL    Eos (Absolute) 0.21 0.00 - 0.51 K/uL    Baso (Absolute) 0.07 0.00 - 0.12 K/uL    Immature Granulocytes (abs) 0.04 0.00 - 0.11 K/uL    NRBC (Absolute) 0.00 K/uL   COMP METABOLIC PANEL   Result Value Ref Range    Sodium 137 135 - 145 mmol/L    Potassium 4.2 3.6 - 5.5 mmol/L    Chloride 104 96 - 112 mmol/L    Co2 26 20 - 33 mmol/L    Anion Gap 7.0 0.0 - 11.9    Glucose 101 (H) 65 - 99 mg/dL    Bun 7 (L) 8 - 22 mg/dL    Creatinine 0.73 0.50 - 1.40 mg/dL    Calcium 9.0 8.4 - 10.2 mg/dL    AST(SGOT) 35 12 - 45 U/L    ALT(SGPT) 34 2 - 50 U/L    Alkaline Phosphatase 67 30 - 99 U/L    Total Bilirubin 0.5 0.1 - 1.5 mg/dL    Albumin 3.9 3.2 - 4.9 g/dL    Total Protein 6.8 6.0 - 8.2 g/dL    Globulin 2.9 1.9 - 3.5 g/dL    A-G Ratio  1.3 g/dL   URINALYSIS (UA)   Result Value Ref Range    Color Yellow     Character Clear     Specific Gravity <=1.005 <1.035    Ph 6.0 5.0-8.0    Glucose Negative Negative mg/dL    Ketones Negative Negative mg/dL    Protein Negative Negative mg/dL    Bilirubin Negative Negative    Nitrite Negative Negative    Leukocyte Esterase Negative Negative    Occult Blood Negative Negative    Micro Urine Req see below    ESTIMATED GFR   Result Value Ref Range    GFR If African American >60 >60 mL/min/1.73 m 2    GFR If Non African American >60 >60 mL/min/1.73 m 2     Initial evaluation from the radiologist indicates no change in the past week with no signs of additional leak, hardware failure, signs of infection.    I spoke with Dr. Damon, hospitalist, about getting the patient over to the other facility. I believe he would benefit from psychiatric intervention as well as possible neurosurgery consultation. He likely needs aggressive rehabilitation for pain control and return to function. Dr. Damon agrees and will transfer him to [facility]. Transfer of care is as of 8:30 PM.    Disposition:  Transfer to [facility] with Dr. Damon, hospitalist      FINAL IMPRESSION  1. Chronic left-sided low back pain with left-sided sciatica    2. Disorientation               Electronically signed by: Jessica Holder, 4/12/2017 3:59 PM

## 2017-04-12 NOTE — IP AVS SNAPSHOT
[employer] Access Code: [email or web address]  Expires: 5/9/2017 12:08 PM    Your email address is not on file at [facility].  Email Addresses are required for you to sign up for [employer], please contact [phone] to verify your personal information and to provide your email address prior to attempting to register for [employer].    Rodriguez Kramer  [address] [place] [address]    [employer]  A secure, online tool to manage your health information     [facility]’s Mac[employer]ix® is a secure, online tool that connects you to your personalized health information from the privacy of your home -- day or night - making it very easy for you to manage your healthcare. Once the activation process is completed, you can even access your medical information using the [employer] alysia, which is available for free in the Apple Alysia store or Google Play store.     To learn more about [employer], visit [email or web address] - your brandst    There are two levels of access available (as shown below):   My Chart Features  [facility] Primary Care Doctor [facility]  Specialists [facility]  Urgent  Care Non-[facility] Primary Care Doctor   Email your healthcare team securely and privately 24/7 X X X    Manage appointments: schedule your next appointment; view details of past/upcoming appointments X      Request prescription refills. X      View recent personal medical records, including lab and immunizations X X X X   View health record, including health history, allergies, medications X X X X   Read reports about your outpatient visits, procedures, consult and ER notes X X X X   See your discharge summary, which is a recap of your hospital and/or ER visit that includes your diagnosis, lab results, and care plan X X  X     How to register for [facility]:  Once your e-mail address has been verified, follow the following steps to sign up for [facility].     1. Go to  [email or web address]  2. Click on the Sign Up Now box, which takes you to the New Member Sign Up page. You will need  to provide the following information:  a. Enter your [facility] Access Code exactly as it appears at the top of this page. (You will not need to use this code after you’ve completed the sign-up process. If you do not sign up before the expiration date, you must request a new code.)   b. Enter your date of birth.   c. Enter your home email address.   d. Click Submit, and follow the next screen’s instructions.  3. Create a [facility] ID. This will be your [facility] login ID and cannot be changed, so think of one that is secure and easy to remember.  4. Create a [facility] password. You can change your password at any time.  5. Enter your Password Reset Question and Answer. This can be used at a later time if you forget your password.   6. Enter your e-mail address. This allows you to receive e-mail notifications when new information is available in [facility].  7. Click Sign Up. You can now view your health information.    For assistance activating your [facility] account, call [phone]

## 2017-04-12 NOTE — LETTER
[facility], EMERGENCY DEPT   [address] [place] [address]  Phone: Dept: [phone] - Fax:        [facility] Progress Report and Disability Certification  Date of Service: 4/12/2017   No Show:  No  Date / Time of Next Visit: 4/17/2017   Claim Information   Patient Name: Rodriguez Kramer  Claim Number:     Employer:    Date of Injury: 12/3/2012     Insurer / TPA: Misc Workers Comp ID / SSN: [id number]    Occupation:  Diagnosis: Diagnoses of Chronic left-sided low back pain with left-sided sciatica and Disorientation were pertinent to this visit.    Medical Information   Related to Industrial Injury? Yes ***   Subjective Complaints:  Left-sided low back pain radiating down to his foot   Objective Findings: Left foot drop, hyperesthesia left foot and leg   Pre-Existing Condition(s): Lumbar laminectomy and discectomy November 2008   Assessment:   Condition Worsened    Status: Additional Care Required  Permanent Disability:No    Plan:   Comments:transferred to [facility] on the hospitalist service    Diagnostics: [facility]    Comments:       Disability Information   Status: Temporarily Totally Disabled    From:  4/12/2017  Through: 4/17/2017 Restrictions are: Temporary   Physical Restrictions   Sitting:    Standing:    Stooping:    Bending:      Squatting:    Walking:    Climbing:    Pushing:      Pulling:    Other:    Reaching Above Shoulder (L):   Reaching Above Shoulder (R):       Reaching Below Shoulder (L):    Reaching Below Shoulder (R):      Not to exceed Weight Limits   Carrying(hrs):   Weight Limit(lb):   Lifting(hrs):   Weight  Limit(lb):     Comments:      Repetitive Actions   Hands: i.e. Fine Manipulations from Grasping:     Feet: i.e. Operating Foot Controls:     Driving / Operate Machinery:     Physician Name: Griselda Holder Physician Signature: GRISELDA Collier M.D. e-Signature:  , Medical Director   Clinic  Name / Location: [facility], EMERGENCY DEPT  [address]  [place] [id number] [phone]     Clinic Phone Number: Dept: [phone]   Appointment Time:  Visit Start Time:    Check-In Time:  2:17 PM Visit Discharge Time: 9:36 PM   Original-Treating Physician or Chiropractor    Page 2-Insurer/TPA    Page 3-Employer    Page 4-Employee

## 2017-04-12 NOTE — IP AVS SNAPSHOT
4/14/2017    Rodriguez Kramer  [address] [place] [address]    Dear Rodriguez:    [facility] wants to ensure your discharge home is safe and you or your loved ones have had all of your questions answered regarding your care after you leave the hospital.    Below is a list of resources and contact information should you have any questions regarding your hospital stay, follow-up instructions, or active medical symptoms.    Questions or Concerns Regarding… Contact   Medical Questions Related to Your Discharge  (7 days a week, 8am-5pm) Contact a Nurse Care Coordinator   [phone]   Medical Questions Not Related to Your Discharge  (24 hours a day / 7 days a week)  Contact the Nurse Health Line   [phone]    Medications or Discharge Instructions Refer to your discharge packet   or contact your [facility] Primary Care Provider   [phone]   Follow-up Appointment(s) Schedule your appointment via [email or web address]   or contact Scheduling [phone]   Billing Review your statement via [email or web address]  or contact Billing [phone]   Medical Records Review your records via [email or web address]   or contact Medical Records [phone]     You may receive a telephone call within two days of discharge. This call is to make certain you understand your discharge instructions and have the opportunity to have any questions answered. You can also easily access your medical information, test results and upcoming appointments via the [email or web address] free online health management tool. You can learn more and sign up at [email or web address]. For assistance setting up your [email or web address] account, please call [phone].    Once again, we want to ensure your discharge home is safe and that you have a clear understanding of any next steps in your care. If you have any questions or concerns, please do not hesitate to contact us, we are here for you. Thank you for choosing [facility] for your healthcare needs.    Sincerely,    Your [facility] Healthcare Team

## 2017-04-12 NOTE — LETTER
[facility], EMERGENCY DEPT   [address] [place] [address]  Phone: Dept: [phone] - Fax:        [facility] Progress Report and Disability Certification  Date of Service: 4/12/2017   No Show:  No  Date / Time of Next Visit:     Claim Information   Patient Name: Rodriguez Kramer  Claim Number:     Employer:    Date of Injury: 12/3/2012     Insurer / TPA: Misc Workers Comp ID / SSN: [id number]    Occupation:  Diagnosis: Diagnoses of Chronic left-sided low back pain with left-sided sciatica and Disorientation were pertinent to this visit.    Medical Information   Related to Industrial Injury?   ***   Subjective Complaints:      Objective Findings:     Pre-Existing Condition(s):     Assessment:        Status:    Permanent Disability:     Plan:      Diagnostics:      Comments:       Disability Information   Status:      From:     Through:   Restrictions are:     Physical Restrictions   Sitting:    Standing:    Stooping:    Bending:      Squatting:    Walking:    Climbing:    Pushing:      Pulling:    Other:    Reaching Above Shoulder (L):   Reaching Above Shoulder (R):       Reaching Below Shoulder (L):    Reaching Below Shoulder (R):      Not to exceed Weight Limits   Carrying(hrs):   Weight Limit(lb):   Lifting(hrs):   Weight  Limit(lb):     Comments:      Repetitive Actions   Hands: i.e. Fine Manipulations from Grasping:     Feet: i.e. Operating Foot Controls:     Driving / Operate Machinery:     Physician Name: Jessica Holder Physician Signature:   e-Signature:  , Medical Director   Clinic Name / Location: [facility], EMERGENCY DEPT  [address] [address]  [phone]     Clinic Phone Number: Dept: [phone]   Appointment Time:  Visit Start Time:    Check-In Time:  2:17 PM Visit Discharge Time:    Original-Treating Physician or Chiropractor    Page  2-Insurer/TPA    Page 3-Employer    Page 4-Employee

## 2017-04-12 NOTE — IP AVS SNAPSHOT
" <p align=\"LEFT\"><IMG SRC=\"//[email or web address]$/[email or web address]\" alt=\"Image\" WIDTH=\"50%\" HEIGHT=\"200\" BORDER=\"\"></p>                   Name:Rodriguez Kramer  Medical Record Number:[id number]  CSN: [id number]    YOB: 1980   Age: 36 y.o.  Sex: male  HT:  WT: 93.5 kg (206 lb 2.1 oz)          Admit Date: 4/12/2017     Discharge Date:   Today's Date: 4/14/2017  Attending Doctor:  Hailey Shields M.D.                  Allergies:  Nkda and Tape          Follow-up Information     1. Follow up with TERRIE Bush. Go on 4/27/2017.    Specialty:  Family Medicine    Why:  Please arrive at 8:50am for a 9:00am appointment. Thank you    Contact information    [address] [place] [id number]  [phone]          2. Follow up with TERRIE Bush.    Specialty:  Family Medicine    Contact information    [address] [address]  [phone]           Medication List      Take these Medications        Instructions    albuterol 108 (90 BASE) MCG/ACT Aers inhalation aerosol    Inhale 2 Puffs by mouth every 6 hours as needed for Shortness of Breath.   Dose:  2 Puff       baclofen 10 MG Tabs   Commonly known as:  LIORESAL    Take 1 Tab by mouth 3 times a day.   Dose:  10 mg       duloxetine 30 MG Cpep   Commonly known as:  CYMBALTA    Take 1 Cap by mouth every day.   Dose:  30 mg       gabapentin 300 MG Caps   Commonly known as:  NEURONTIN    Take 2 Caps by mouth 3 times a day as needed.   Dose:  600 mg       lorazepam 1 MG Tabs   Commonly known as:  ATIVAN    Take 1 Tab by mouth every four hours as needed for Anxiety.   Dose:  1 mg       omeprazole 20 MG delayed-release capsule   Commonly known as:  PRILOSEC    Take 20 mg by mouth every day.   Dose:  20 mg       oxycodone-acetaminophen 5-325 MG Tabs   Commonly known as:  PERCOCET    Take 1-2 Tabs by mouth every four hours as needed.   Dose:  1-2 Tab         "

## 2017-04-13 PROBLEM — F32.A DEPRESSION: Status: ACTIVE | Noted: 2017-04-13

## 2017-04-13 PROCEDURE — 700102 HCHG RX REV CODE 250 W/ 637 OVERRIDE(OP): Performed by: STUDENT IN AN ORGANIZED HEALTH CARE EDUCATION/TRAINING PROGRAM

## 2017-04-13 PROCEDURE — A9270 NON-COVERED ITEM OR SERVICE: HCPCS | Performed by: HOSPITALIST

## 2017-04-13 PROCEDURE — 99225 PR SUBSEQUENT OBSERVATION CARE,LEVEL II: CPT | Performed by: INTERNAL MEDICINE

## 2017-04-13 PROCEDURE — 96376 TX/PRO/DX INJ SAME DRUG ADON: CPT

## 2017-04-13 PROCEDURE — A9270 NON-COVERED ITEM OR SERVICE: HCPCS | Performed by: STUDENT IN AN ORGANIZED HEALTH CARE EDUCATION/TRAINING PROGRAM

## 2017-04-13 PROCEDURE — 700102 HCHG RX REV CODE 250 W/ 637 OVERRIDE(OP): Performed by: PSYCHIATRY & NEUROLOGY

## 2017-04-13 PROCEDURE — 700111 HCHG RX REV CODE 636 W/ 250 OVERRIDE (IP): Performed by: INTERNAL MEDICINE

## 2017-04-13 PROCEDURE — G0378 HOSPITAL OBSERVATION PER HR: HCPCS

## 2017-04-13 PROCEDURE — 700102 HCHG RX REV CODE 250 W/ 637 OVERRIDE(OP): Performed by: HOSPITALIST

## 2017-04-13 PROCEDURE — A9270 NON-COVERED ITEM OR SERVICE: HCPCS | Performed by: PSYCHIATRY & NEUROLOGY

## 2017-04-13 PROCEDURE — 96375 TX/PRO/DX INJ NEW DRUG ADDON: CPT

## 2017-04-13 PROCEDURE — 700111 HCHG RX REV CODE 636 W/ 250 OVERRIDE (IP): Performed by: HOSPITALIST

## 2017-04-13 RX ORDER — BACLOFEN 10 MG/1
10 TABLET ORAL 3 TIMES DAILY
Status: DISCONTINUED | OUTPATIENT
Start: 2017-04-13 | End: 2017-04-14 | Stop reason: HOSPADM

## 2017-04-13 RX ORDER — GABAPENTIN 400 MG/1
800 CAPSULE ORAL 3 TIMES DAILY
Status: DISCONTINUED | OUTPATIENT
Start: 2017-04-13 | End: 2017-04-14 | Stop reason: HOSPADM

## 2017-04-13 RX ORDER — DULOXETIN HYDROCHLORIDE 30 MG/1
30 CAPSULE, DELAYED RELEASE ORAL DAILY
Status: DISCONTINUED | OUTPATIENT
Start: 2017-04-14 | End: 2017-04-14 | Stop reason: HOSPADM

## 2017-04-13 RX ADMIN — HYDROMORPHONE HYDROCHLORIDE 1 MG: 1 INJECTION, SOLUTION INTRAMUSCULAR; INTRAVENOUS; SUBCUTANEOUS at 04:01

## 2017-04-13 RX ADMIN — ACETAMINOPHEN 650 MG: 325 TABLET, FILM COATED ORAL at 06:28

## 2017-04-13 RX ADMIN — GABAPENTIN 600 MG: 300 CAPSULE ORAL at 18:04

## 2017-04-13 RX ADMIN — OXYCODONE HYDROCHLORIDE 10 MG: 10 TABLET ORAL at 08:18

## 2017-04-13 RX ADMIN — STANDARDIZED SENNA CONCENTRATE AND DOCUSATE SODIUM 2 TABLET: 8.6; 5 TABLET, FILM COATED ORAL at 08:18

## 2017-04-13 RX ADMIN — ACETAMINOPHEN 650 MG: 325 TABLET, FILM COATED ORAL at 18:04

## 2017-04-13 RX ADMIN — BACLOFEN 10 MG: 10 TABLET ORAL at 21:10

## 2017-04-13 RX ADMIN — GABAPENTIN 600 MG: 300 CAPSULE ORAL at 08:18

## 2017-04-13 RX ADMIN — GABAPENTIN 800 MG: 400 CAPSULE ORAL at 21:09

## 2017-04-13 RX ADMIN — LORAZEPAM 1 MG: 1 TABLET ORAL at 06:28

## 2017-04-13 RX ADMIN — STANDARDIZED SENNA CONCENTRATE AND DOCUSATE SODIUM 2 TABLET: 8.6; 5 TABLET, FILM COATED ORAL at 21:10

## 2017-04-13 RX ADMIN — ACETAMINOPHEN 650 MG: 325 TABLET, FILM COATED ORAL at 12:59

## 2017-04-13 RX ADMIN — KETOROLAC TROMETHAMINE 30 MG: 30 INJECTION, SOLUTION INTRAMUSCULAR; INTRAVENOUS at 12:59

## 2017-04-13 RX ADMIN — HYDROMORPHONE HYDROCHLORIDE 1 MG: 1 INJECTION, SOLUTION INTRAMUSCULAR; INTRAVENOUS; SUBCUTANEOUS at 00:01

## 2017-04-13 ASSESSMENT — ENCOUNTER SYMPTOMS
COUGH: 0
DIZZINESS: 0
NAUSEA: 0
BACK PAIN: 1
FEVER: 0
ABDOMINAL PAIN: 0
HEADACHES: 1
MYALGIAS: 1
DIARRHEA: 0

## 2017-04-13 ASSESSMENT — PAIN SCALES - GENERAL
PAINLEVEL_OUTOF10: 9
PAINLEVEL_OUTOF10: 8
PAINLEVEL_OUTOF10: 0
PAINLEVEL_OUTOF10: 0

## 2017-04-13 ASSESSMENT — LIFESTYLE VARIABLES: DO YOU DRINK ALCOHOL: NO

## 2017-04-13 NOTE — PROGRESS NOTES
Direct admit from [facility]. Dr. Damon sending and accepting for Chronic Back Pain and Depression. ADT signed and held by Dr. Damon and will need to be released upon patient arrival to unit. Patient arriving via ground transport.

## 2017-04-13 NOTE — H&P
PRIMARY CARE:  CASTRO Bush    CHIEF COMPLAINT:  Persistent back pain and depression.    HISTORY OF PRESENT ILLNESS:  The patient is a 36-year-old gentleman with   history of multiple back surgeries and recent hospitalization for back surgery   followed by possible fluid collection in the mark-surgical area.  Patient was   deferred to outpatient pain management because of the persistent pain.  The   patient states that he ran out of his medication for the past couple of days   and has been experiencing worsening pain and difficulty managing himself.  He   has difficulty breathing himself or doing the activities of daily living.    Patient states that he also has been experiencing significant depression and   is not able to deal with it.  He does not know how much longer he will be able   to tolerate this.  The patient denied suicidal ideation per se, however.    Patient denied recurrent trauma to the back.  Patient denied any fevers,   chills, nausea, vomiting or diarrhea.  Patient does have persistent left lower   extremity footdrop along with the sciatic pain.    PAST MEDICAL HISTORY:  1.  Recent hospitalization for methamphetamine withdrawal.  2.  Recent L-spine surgery.  3.  Left foot drop.  4.  Chronic back pain.  5.  Hyperlipidemia.    PAST SURGICAL HISTORY:  Back surgery x5.    FAMILY HISTORY:  Reviewed and negative.    SOCIAL HISTORY:  1.  Methamphetamine abuse by IV with last use approximately a week ago prior   to previous hospitalization.  2.  Tobacco abuse, smokes a few cigarettes a day and marijuana, occasional.  3.  Denied alcohol.  3.  Denied any other polysubstance abuse.    ALLERGIES:  TAPE.    MEDICATIONS:  Albuterol MDI 2 puffs q. 6 hours p.r.n. for dyspnea, gabapentin   600 mg t.i.d. p.r.n., lorazepam 1 mg q. 4 hours p.r.n. for anxiety, Percocet   5/325 1-2 tabs q. 4 hours p.r.n. for pain.    REVIEW OF SYSTEMS:  Unchanged from recent hospitalization.    PHYSICAL EXAMINATION:  GENERAL:   Well-developed white male in moderate distress.  VITAL SIGNS:  Temperature 36.7, heart rate 89, respirations 16, blood pressure   120/80, saturating 94% on room air.  HEENT:  Normocephalic, atraumatic.  Pupils are equal, round and reactive to   light, anicteric sclerae.  Extraocular muscles are intact.  NECK:  No cervical lymphadenopathy appreciated.  Oropharynx is small with   Mallampati score of 3.  Mucosa is moist, clear, without ulcerations or   exudates.  PULMONARY:  Clear to auscultation bilaterally.  CARDIOVASCULAR:  Regular rate and rhythm without murmurs, rubs or gallops.  ABDOMEN:  Decreased bowel sounds, soft, nontender, nondistended.  EXTREMITIES:  No clubbing, cyanosis or edema.  NEUROLOGIC:  Cranial nerves II-XII intact.  SKIN:  Patient has persistent left foot drop.  SKIN:  No erythema or ulcerations.    LABORATORY DATA:  WBC of 7.9, hemoglobin 17.8, platelet 301.  Sodium 137,   potassium 4.2, chloride 104, bicarbonate 26, BUN 7, creatinine 0.73, glucose   101, calcium 9, alk phos 67, AST 35, ALT 34, total bili 0.5.  Urine tox screen   is positive for benzodiazepines and urine tetrahydrocannabinol.  Urinalysis   negative.  L-spine MRI result is pending, but the preliminary report indicates   no significant change from prior evaluation.    ASSESSMENT AND PLAN:  A 36-year-old gentleman with history of chronic back   pain and multiple back surgeries, now with increasing back pain secondary to   running out of pain medication as well as having increasing depression.    PLAN:  1.  Back pain:  We will initiate to schedule Tylenol as well as gabapentin.    We will provide p.r.n. oxycodone for breakthrough pain as well as Toradol.  If   the MRI report indicates significant worsening or changes, we will contact   the spinal surgeon.  2.  Depression:  We will obtain psychiatric consultation for management.  3.  Insomnia:  Recommend outpatient followup.  4.  Left foot drop:  Encourage PT, OT and education.  5.   Tobacco abuse.  Provide cessation education and Nicoderm.  6.  Marijuana abuse.  Provide cessation education.  7.  Methamphetamine abuse:  Provide cessation education.  8.  Stable issues:  Medical history including hyperlipidemia.  9.  Preventive:  Provide stool softener and SCDs.    DISPOSITION:  Complex and fair.       ____________________________________     MD HUA BURGOS / MASOUD    DD:  04/12/2017 19:35:11  DT:  04/12/2017 21:22:58    D#:  [id number]  Job#:  [id number]    cc: Brina ERAZO

## 2017-04-13 NOTE — PROGRESS NOTES
Transferring patient to [facility] per physician order, ED tech transporting pt on a hospital bed. . Belongings with patient at time of transfer.  Report called to  Rosaura RAMIREZ @ [phone].  Plan of care and pertinent background information given and discussed.  Questions answered.
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
## 2017-04-13 NOTE — PROGRESS NOTES
Hospital Medicine Progress Note, Adult, Complex               Author: Hailey Shields Date & Time created: 4/13/2017  7:57 AM     Interval History:  The patient presented with worsening of his chronic back pain and had MRI of the lumbar spine done and was then sent for psychiatric evaluation and neurosurgical evaluation    The patient has had back surgery with Dr. Calderon in the past  Overnight he has been getting iv dilaudid and required two doses this morning for his pain    Review of Systems:  Review of Systems   Constitutional: Negative for fever.   HENT: Negative for congestion.    Respiratory: Negative for cough.    Cardiovascular: Negative for chest pain.   Gastrointestinal: Negative for nausea, abdominal pain and diarrhea.   Genitourinary: Negative for dysuria.   Musculoskeletal: Positive for myalgias and back pain.   Neurological: Positive for headaches. Negative for dizziness.       Physical Exam:  Physical Exam   HENT:   Mouth/Throat: No oropharyngeal exudate.   Eyes: Conjunctivae are normal.   Neck: Neck supple.   Cardiovascular: Normal rate and regular rhythm.    No murmur heard.  Pulmonary/Chest: Effort normal and breath sounds normal.   Abdominal: Soft. Bowel sounds are normal.   Musculoskeletal: He exhibits no edema.   Skin: Skin is dry. He is not diaphoretic.   Psychiatric: His affect is labile.   Nursing note and vitals reviewed.      Labs:        Invalid input(s): [id number]      Recent Labs      04/12/17   1630   SODIUM  137   POTASSIUM  4.2   CHLORIDE  104   CO2  26   BUN  7*   CREATININE  0.73   CALCIUM  9.0     Recent Labs      04/12/17   1630   ALTSGPT  34   ASTSGOT  35   ALKPHOSPHAT  67   TBILIRUBIN  0.5   GLUCOSE  101*     Recent Labs      04/12/17   1630   RBC  6.35*   HEMOGLOBIN  17.8   HEMATOCRIT  50.7   PLATELETCT  301     Recent Labs      04/12/17   1630   WBC  7.9   NEUTSPOLYS  56.80   LYMPHOCYTES  28.40   MONOCYTES  10.70   EOSINOPHILS  2.70   BASOPHILS  0.90   ASTSGOT  35    ALTSGPT  34   ALKPHOSPHAT  67   TBILIRUBIN  0.5           Hemodynamics:  Temp (24hrs), Av.3 °C (97.4 °F), Min:36.1 °C (97 °F), Max:36.6 °C (97.8 °F)  Temperature: 36.1 °C (97 °F)  Pulse  Av  Min: 71  Max: 85   Blood Pressure: 112/67 mmHg     Respiratory:    Respiration: 19, Pulse Oximetry: 95 %           Fluids:  No intake or output data in the 24 hours ending 17 0757  Weight: 93.5 kg (206 lb 2.1 oz)  GI/Nutrition:  Orders Placed This Encounter   Procedures   • Diet Order     Standing Status: Standing      Number of Occurrences: 1      Standing Expiration Date:      Order Specific Question:  Diet:     Answer:  Cardiac [6]     Medical Decision Making, by Problem:  Active Hospital Problems    Diagnosis   • Depression [F32.9] patient states he is depressed and having suicidal thoughts, will have psychiatry evaluate him for this   • Acute exacerbation of chronic low back pain [M54.5, G89.29] continue pain control, MRI is done but  No official read available yet, will follow up on this and call Dr. Calderon depending on the results.       Labs reviewed, Medications reviewed and Radiology images reviewed  Alejandro catheter: No Alejandro      DVT Prophylaxis: Contraindicated - High bleeding risk    Ulcer prophylaxis: Not indicated    Assessed for rehab: Patient returned to prior level of function, rehabilitation not indicated at this time

## 2017-04-13 NOTE — PROGRESS NOTES
"Pt very emotional  And crying, pt stated 'I can't do it anymore,I need help, I'm not myself lately, I can't live like this anymore\". Pt said yes when asked if he has current suicidal thought.SADPERSONS score 5, Dr Arriaga and  notified.Pt placed on legal hold.  "

## 2017-04-13 NOTE — PROGRESS NOTES
Pt transferred to room T213 close to nurses station for  Unobstructed direct observation by RN and techs.

## 2017-04-13 NOTE — PROGRESS NOTES
Pt  admitted to room T209 via EMS in  A stretcher  from [facility] at 2153.  Pt aaox4 .Pain reported at 9 on a scale of 0-10.Pt very emotional and crying at this time. Oriented to room call light and smoking policy.  Reviewed plan of care (equipment,  medications, activity, diet, fall precautions, skin care, and pain) with patient.

## 2017-04-13 NOTE — PROGRESS NOTES
Pt awake , c/o lower back pain, medicated per MAR, pt turned and self repositioned in bed for comfort.

## 2017-04-14 ENCOUNTER — TELEPHONE (OUTPATIENT)
Dept: INFECTIOUS DISEASES | Facility: MEDICAL CENTER | Age: 37
End: 2017-04-14

## 2017-04-14 ENCOUNTER — PATIENT OUTREACH (OUTPATIENT)
Dept: HEALTH INFORMATION MANAGEMENT | Facility: OTHER | Age: 37
End: 2017-04-14

## 2017-04-14 VITALS
RESPIRATION RATE: 17 BRPM | SYSTOLIC BLOOD PRESSURE: 116 MMHG | OXYGEN SATURATION: 97 % | HEART RATE: 60 BPM | DIASTOLIC BLOOD PRESSURE: 81 MMHG | TEMPERATURE: 96.8 F | WEIGHT: 206.13 LBS | BODY MASS INDEX: 27.95 KG/M2

## 2017-04-14 PROCEDURE — 700102 HCHG RX REV CODE 250 W/ 637 OVERRIDE(OP): Performed by: HOSPITALIST

## 2017-04-14 PROCEDURE — G0378 HOSPITAL OBSERVATION PER HR: HCPCS

## 2017-04-14 PROCEDURE — A9270 NON-COVERED ITEM OR SERVICE: HCPCS | Performed by: HOSPITALIST

## 2017-04-14 PROCEDURE — 99217 PR OBSERVATION CARE DISCHARGE: CPT | Performed by: INTERNAL MEDICINE

## 2017-04-14 PROCEDURE — A9270 NON-COVERED ITEM OR SERVICE: HCPCS | Performed by: PSYCHIATRY & NEUROLOGY

## 2017-04-14 PROCEDURE — 700102 HCHG RX REV CODE 250 W/ 637 OVERRIDE(OP): Performed by: PSYCHIATRY & NEUROLOGY

## 2017-04-14 PROCEDURE — 700102 HCHG RX REV CODE 250 W/ 637 OVERRIDE(OP): Performed by: STUDENT IN AN ORGANIZED HEALTH CARE EDUCATION/TRAINING PROGRAM

## 2017-04-14 PROCEDURE — A9270 NON-COVERED ITEM OR SERVICE: HCPCS | Performed by: STUDENT IN AN ORGANIZED HEALTH CARE EDUCATION/TRAINING PROGRAM

## 2017-04-14 RX ORDER — BACLOFEN 10 MG/1
10 TABLET ORAL 3 TIMES DAILY
Qty: 90 TAB | Refills: 0 | Status: SHIPPED | OUTPATIENT
Start: 2017-04-14

## 2017-04-14 RX ORDER — DULOXETIN HYDROCHLORIDE 30 MG/1
30 CAPSULE, DELAYED RELEASE ORAL DAILY
Qty: 30 CAP | Refills: 0 | Status: SHIPPED | OUTPATIENT
Start: 2017-04-14

## 2017-04-14 RX ADMIN — ACETAMINOPHEN 650 MG: 325 TABLET, FILM COATED ORAL at 05:40

## 2017-04-14 RX ADMIN — DULOXETINE HYDROCHLORIDE 30 MG: 30 CAPSULE, DELAYED RELEASE ORAL at 08:57

## 2017-04-14 RX ADMIN — ACETAMINOPHEN 650 MG: 325 TABLET, FILM COATED ORAL at 01:01

## 2017-04-14 RX ADMIN — BACLOFEN 10 MG: 10 TABLET ORAL at 09:08

## 2017-04-14 RX ADMIN — GABAPENTIN 800 MG: 400 CAPSULE ORAL at 08:57

## 2017-04-14 RX ADMIN — ACETAMINOPHEN 650 MG: 325 TABLET, FILM COATED ORAL at 12:33

## 2017-04-14 ASSESSMENT — PAIN SCALES - GENERAL: PAINLEVEL_OUTOF10: 0

## 2017-04-14 NOTE — PROGRESS NOTES
Pt. discharged with all medication, prescriptions, follow up info., personal belongings and education.  A&Ox4, VSS.  Family member picked up pt.

## 2017-04-14 NOTE — DISCHARGE INSTRUCTIONS
Discharge Instructions    Discharged to home by car with relative. Discharged via wheelchair, hospital escort: Yes.  Special equipment needed: Cane    Be sure to schedule a follow-up appointment with your primary care doctor or any specialists as instructed.     Discharge Plan:   Smoking Cessation Offered: Patient Counseled  Influenza Vaccine Indication: Not indicated: Previously immunized this influenza season and > 8 years of age    I understand that a diet low in cholesterol, fat, and sodium is recommended for good health. Unless I have been given specific instructions below for another diet, I accept this instruction as my diet prescription.   Other diet: Unrestricted    Special Instructions: None    · Is patient discharged on Warfarin / Coumadin?   No     · Is patient Post Blood Transfusion?  No    Depression / Suicide Risk    As you are discharged from this [facility] facility, it is important to learn how to keep safe from harming yourself.    Recognize the warning signs:  · Abrupt changes in personality, positive or negative- including increase in energy   · Giving away possessions  · Change in eating patterns- significant weight changes-  positive or negative  · Change in sleeping patterns- unable to sleep or sleeping all the time   · Unwillingness or inability to communicate  · Depression  · Unusual sadness, discouragement and loneliness  · Talk of wanting to die  · Neglect of personal appearance   · Rebelliousness- reckless behavior  · Withdrawal from people/activities they love  · Confusion- inability to concentrate     If you or a loved one observes any of these behaviors or has concerns about self-harm, here's what you can do:  · Talk about it- your feelings and reasons for harming yourself  · Remove any means that you might use to hurt yourself (examples: pills, rope, extension cords, firearm)  · Get professional help from the community (Mental Health, Substance Abuse, psychological  counseling)  · Do not be alone:Call your Safe Contact- someone whom you trust who will be there for you.  · Call your local CRISIS HOTLINE 987-8716 or 582-123-1065  · Call your local Children's Mobile Crisis Response Team [place] (614) 919-8146 or www.[facility]  · Call the toll free National Suicide Prevention Hotlines   · National Suicide Prevention Lifeline 701-906-[facility] (3134)  · National Hope Line Network 800-SUICIDE (190-9051)

## 2017-04-14 NOTE — TELEPHONE ENCOUNTER
Train Pharmacist from [facility] called asking about the abx for this pt as in their notes, the IV abx were to finish today. And wanted to know from our physician if this was still the plan or there was the possibility of an extension.  Nancy ROACH consulted, who says this patient was seen over the weekend and his PICC was removed because the was using the line for drug use.   Train informed and said was going to make sure the home nurse is aware of this information.

## 2017-04-14 NOTE — PROGRESS NOTES
"Assumed care, pt laying in bed awake alert and resp, no distress, states doing better and \"on the right track\" bed in lowest and locked position, every 15 minute checks to be maintained, safety precautions in place.   "

## 2017-04-14 NOTE — PROGRESS NOTES
"Pt requires Request for Authorization form from the \"[facility]\" ([phone]).  Documents need to be signed by Dr. Shields. Message was left at 1730 with regards to fax form to [employer] ([phone]). Suggest follow up during business hours on 4/14 to confirm status.  "

## 2017-04-16 NOTE — DISCHARGE SUMMARY
DATE OF ADMISSION:  04/12/2017    DATE OF DISCHARGE:  04/14/2017    DISCHARGE DIAGNOSES:  1.  Acute worsening of chronic back pain.  2.  Narcotic dependence.  3.  Methamphetamine abuse.  4.  Major depressive disorder.    HOSPITAL COURSE:  The patient is a 36-year-old male who has a history of   chronic back pain and had prior back surgery with Dr. Calderon.  He presented to   the hospital complaining of worsening of his chronic back pain.  He had MRI   of the lumbar spine performed, which showed no acute pathology.  He did have   evidence of compression of the S1 nerve root with some mild disk protrusion, I   did discuss these results with Dr. Calderon from neurosurgery, who is very   familiar with the patient.  He did review the images and stated there was   nothing acute, that required any surgical intervention.  The patient was taken   off his intravenous pain medication and psychiatry was consulted as patient   was writhing and crying, stating he was in severe pain after psychiatry did   discuss with him, he was no longer crying.  He was no longer writhing in pain.    He was started on Cymbalta and baclofen and gabapentin was increased to 800   mg 3 times daily.  The patient stated his pain was at his baseline and was   well controlled with this.    DISPOSITION:  The patient is discharged home.  He was set up with the   rehabilitation facility in [place] to assist him with his withdrawal from   narcotics as well as methamphetamines.    DISCHARGE MEDICATIONS:  Prilosec 20 mg daily, Percocet 5/325 mg 1-2 tablets   every 4 hours as needed for pain, albuterol 2 puffs every 6 hours as needed   for shortness of breath, Cymbalta 30 mg daily, Baclofen 10 mg 3 times daily,   Neurontin 600 mg 3 times daily, to be increased to 800 mg 3 times daily by his   primary care physician and Ativan 1 mg every 4 hours as needed for anxiety.    Followup is with the rehabilitation facility in [place] as needed and his   primary care  provider Brina Champion within 2-4 weeks.    CONSULTANTS DURING THIS HOSPITAL STAY:  I did discuss the case with Dr. Calderon, who also reviewed the MRI imaging and psychiatry, Dr. Smith was   consulted.       ____________________________________     DIXON MYRICK MD    OPB / [facility]    DD:  04/15/2017 07:42:00  DT:  04/15/2017 08:33:09    D#:  [id number]  Job#:  [id number]    cc: JIHAN CALDERON MD, Trinity Smith MD, Brina GERMAIN

## 2017-06-03 NOTE — ADDENDUM NOTE
Encounter addended by: Belkis Butt R.N. on: 6/2/2017  5:38 PM<BR>     Documentation filed: Inpatient Document Flowsheet

## 2020-04-10 ENCOUNTER — ANTICOAGULATION MONITORING (OUTPATIENT)
Dept: VASCULAR LAB | Facility: MEDICAL CENTER | Age: 40
End: 2020-04-10

## 2021-04-29 NOTE — CARE PLAN
1. Follow up as scheduled.     Problem: Safety  Goal: Will remain free from injury  Outcome: PROGRESSING AS EXPECTED    Problem: Infection  Goal: Will remain free from infection  Outcome: PROGRESSING AS EXPECTED

## (undated) DEVICE — DRESSING TRANSPARENT FILM TEGADERM 4 X 4.75" (50EA/BX)"

## (undated) DEVICE — GLOVE BIOGEL SZ 9 SURGICAL PF LTX - (50/BX 4BX/CA)

## (undated) DEVICE — SUTURE 0 VICRYL PLUS CT-1 - 36 INCH (36/BX)

## (undated) DEVICE — GLOVE BIOGEL INDICATOR SZ 9 SURGICAL PF LTX - (160/CA)

## (undated) DEVICE — KIT ROOM DECONTAMINATION

## (undated) DEVICE — PACK NEURO - (2EA/CA)

## (undated) DEVICE — NEPTUNE 4 PORT MANIFOLD - (20/PK)

## (undated) DEVICE — CATHETER IV 14 GA X 2 ---SURG.& SDS ONLY---(200EA/CA)

## (undated) DEVICE — GOWN SURGEONS X-LARGE - DISP. (30/CA)

## (undated) DEVICE — ELECTRODE 850 FOAM ADHESIVE - HYDROGEL RADIOTRNSPRNT (50/PK)

## (undated) DEVICE — GLOVE BIOGEL PI ORTHO SZ 8.5 PF LF (40/BX)

## (undated) DEVICE — MASK ANESTHESIA ADULT  - (100/CA)

## (undated) DEVICE — SET LEADWIRE 5 LEAD BEDSIDE DISPOSABLE ECG (1SET OF 5/EA)

## (undated) DEVICE — DRAPE IOBAN II INCISE 23X17 - (10EA/BX 4BX/CA)

## (undated) DEVICE — KIT SURGIFLO W/OUT THROMBIN - (6EA/CA)

## (undated) DEVICE — ARMREST CRADLE FOAM - (2PR/PK 12PR/CA)

## (undated) DEVICE — SUCTION INSTRUMENT YANKAUER BULBOUS TIP W/O VENT (50EA/CA)

## (undated) DEVICE — SYRINGE SAFETY 10 ML 18 GA X 1 1/2 BLUNT LL (100/BX 4BX/CA)

## (undated) DEVICE — CANISTER SUCTION 3000ML MECHANICAL FILTER AUTO SHUTOFF MEDI-VAC NONSTERILE LF DISP  (40EA/CA)

## (undated) DEVICE — TRAY CATHETER FOLEY URINE METER W/STATLOCK 350ML (10EA/CA)

## (undated) DEVICE — DRESSING NON-ADHERING 8 X 3 - (50/BX)

## (undated) DEVICE — SYRINGE SAFETY 5 ML 18 GA X 1-1/2 BLUNT LL (100/BX 4BX/CA)

## (undated) DEVICE — TOOL DISSECT MATCH HEAD

## (undated) DEVICE — HEMOSTAT SURG ABSORBABLE - 4 X 8 IN SURGICEL (24EA/CA)

## (undated) DEVICE — TRAY SKIN SCRUB PVP WET (20EA/CA) PART #DYND70356 DISCONTINUED

## (undated) DEVICE — PACK JACKSON TABLE KIT W/OUT - HR (6EA/CA)

## (undated) DEVICE — SUTURE GENERAL

## (undated) DEVICE — ELECTRODE DUAL RETURN W/ CORD - (50/PK)

## (undated) DEVICE — DRAPE STRLE REG TOWEL 18X24 - (10/BX 4BX/CA)"

## (undated) DEVICE — TUBE, CULTURE AEROBIC

## (undated) DEVICE — DISSECT TOOL MIDAS 14BA50

## (undated) DEVICE — CORDS BIPOLAR COAGULATION - 12FT STERILE DISP. (10EA/BX)

## (undated) DEVICE — SUTURE 2-0 VICRYL CT-2  8 X 18 INCH (12EA/BX)

## (undated) DEVICE — TUBING CLEARLINK DUO-VENT - C-FLO (48EA/CA)

## (undated) DEVICE — HEADREST PRONEVIEW LARGE - (10/CA)

## (undated) DEVICE — SLEEVE, VASO, THIGH, MED

## (undated) DEVICE — GLOVE BIOGEL PI INDICATOR SZ 7.0 SURGICAL PF LF - (50/BX 4BX/CA)

## (undated) DEVICE — MIDAS LUBRICATOR DIFFUSER PACK (4EA/CA)

## (undated) DEVICE — TUBE E-T HI-LO CUFF 7.5MM (10EA/PK)

## (undated) DEVICE — STAPLER SKIN DISP - (6/BX 10BX/CA) VISISTAT

## (undated) DEVICE — KIT ANESTHESIA W/CIRCUIT & 3/LT BAG W/FILTER (20EA/CA)

## (undated) DEVICE — SODIUM CHL IRRIGATION 0.9% 1000ML (12EA/CA)

## (undated) DEVICE — SPONGE GAUZESTER 4 X 4 4PLY - (128PK/CA)

## (undated) DEVICE — SUTURE 2-0 VICRYL PLUS CTX - 8 X 18 INCH (12/BX)

## (undated) DEVICE — SUTURE 0 VICRYL PLUS CT-1 - 8 X 18 INCH (12/BX)

## (undated) DEVICE — DRAPE MICROSCOPE X-LONG (10EA/CA)

## (undated) DEVICE — DRAPE LAPAROTOMY T SHEET - (12EA/CA)

## (undated) DEVICE — SUTURE 0 VICRYL PLUS CT-2 - 8 X 18 INCH (12/BX)

## (undated) DEVICE — SENSOR SPO2 NEO LNCS ADHESIVE (20/BX) SEE USER NOTES

## (undated) DEVICE — PROTECTOR ULNA NERVE - (36PR/CA)

## (undated) DEVICE — SET EXTENSION WITH 2 PORTS (48EA/CA) ***PART #2C8610 IS A SUBSTITUTE*****

## (undated) DEVICE — LEAD SET 6 DISP. EKG NIHON KOHDEN (100EA/CA) [9859].

## (undated) DEVICE — GLOVE BIOGEL SZ 6.5 SURGICAL PF LTX (50PR/BX 4BX/CA)

## (undated) DEVICE — GOWN WARMING STANDARD FLEX - (30/CA)

## (undated) DEVICE — APPLICATOR COTTON TIP 6 IN - STERILE (2EA/PK 100PK/BX)

## (undated) DEVICE — SUTURE 4-0 NUROLON CR/8 TF - (12/BX) ETHICON

## (undated) DEVICE — LACTATED RINGERS INJ. 500 ML - (24EA/CA)

## (undated) DEVICE — STERI STRIP COMPOUND BENZOIN - TINCTURE 0.6ML WITH APPLICATOR (40EA/BX)

## (undated) DEVICE — CHLORAPREP 26 ML APPLICATOR - ORANGE TINT(25/CA)

## (undated) DEVICE — KIT EVACUATER 3 SPRING PVC LF 1/8 DRAIN SIZE (10EA/CA)"

## (undated) DEVICE — CLOSURE SKIN STRIP 1/2 X 4 IN - (STERI STRIP) (50/BX 4BX/CA)

## (undated) DEVICE — SWAB ANAEROBIC SPEC.COLLECTOR - (25/PK 4PK/CA 100EA/CA)

## (undated) DEVICE — COVER LIGHT HANDLE FLEXIBLE - SOFT (2EA/PK 80PK/CA)

## (undated) DEVICE — LACTATED RINGERS INJ 1000 ML - (14EA/CA 60CA/PF)